# Patient Record
Sex: MALE | Race: WHITE | NOT HISPANIC OR LATINO | Employment: PART TIME | URBAN - METROPOLITAN AREA
[De-identification: names, ages, dates, MRNs, and addresses within clinical notes are randomized per-mention and may not be internally consistent; named-entity substitution may affect disease eponyms.]

---

## 2018-03-14 ENCOUNTER — HOSPITAL ENCOUNTER (OUTPATIENT)
Dept: RADIOLOGY | Facility: MEDICAL CENTER | Age: 68
End: 2018-03-14

## 2018-03-16 ENCOUNTER — HOSPITAL ENCOUNTER (OUTPATIENT)
Dept: RADIATION ONCOLOGY | Facility: MEDICAL CENTER | Age: 68
End: 2018-03-31
Attending: RADIOLOGY
Payer: COMMERCIAL

## 2018-03-16 ENCOUNTER — HOSPITAL ENCOUNTER (OUTPATIENT)
Dept: RADIOLOGY | Facility: MEDICAL CENTER | Age: 68
End: 2018-03-16

## 2018-03-16 VITALS
HEART RATE: 66 BPM | HEIGHT: 70 IN | BODY MASS INDEX: 34.07 KG/M2 | OXYGEN SATURATION: 95 % | TEMPERATURE: 98.2 F | DIASTOLIC BLOOD PRESSURE: 77 MMHG | RESPIRATION RATE: 18 BRPM | SYSTOLIC BLOOD PRESSURE: 154 MMHG | WEIGHT: 238 LBS

## 2018-03-16 DIAGNOSIS — C20 RECTAL CANCER (HCC): ICD-10-CM

## 2018-03-16 PROCEDURE — 99205 OFFICE O/P NEW HI 60 MIN: CPT | Performed by: RADIOLOGY

## 2018-03-16 PROCEDURE — 99214 OFFICE O/P EST MOD 30 MIN: CPT | Performed by: RADIOLOGY

## 2018-03-16 RX ORDER — MULTIVIT WITH MINERALS/LUTEIN
TABLET ORAL
COMMUNITY

## 2018-03-16 RX ORDER — LOPERAMIDE HYDROCHLORIDE 2 MG/1
2 CAPSULE ORAL 4 TIMES DAILY PRN
COMMUNITY

## 2018-03-16 RX ORDER — POLYETHYLENE GLYCOL 3350 17 G/17G
17 POWDER, FOR SOLUTION ORAL DAILY
COMMUNITY

## 2018-03-16 RX ORDER — CHOLECALCIFEROL (VITAMIN D3) 25 MCG
TABLET,CHEWABLE ORAL
COMMUNITY

## 2018-03-16 RX ORDER — TAMSULOSIN HYDROCHLORIDE 0.4 MG/1
0.4 CAPSULE ORAL
COMMUNITY

## 2018-03-16 RX ORDER — CHLORAL HYDRATE 500 MG
1000 CAPSULE ORAL
COMMUNITY

## 2018-03-16 NOTE — NON-PROVIDER
"Patient was seen today in clinic with Dr. Elver Salas for Rectal Cancer.  Vitals signs and weight were obtained and pain assessment was completed.  Allergies and medications were reviewed with the patient.  Review of systems completed.     Vitals/Pain:  Vitals:    03/16/18 0751   BP: 154/77   Pulse: 66   Resp: 18   Temp: 36.8 °C (98.2 °F)   SpO2: 95%   Weight: 108 kg (238 lb)   Height: 1.778 m (5' 10\")        Pain Scale: 0-10  Pain Assessement: 0  Pain Location, Orientation and Scale: no complaints of pain.    Allergies:   Patient has no known allergies.    Current Medications:  No current outpatient prescriptions on file.     No current facility-administered medications for this encounter.          PCP:  Pcp - pt could not remember PCP name (does not use often)        Anjelica Mejia R.N.  "

## 2018-03-16 NOTE — CONSULTS
RADIATION ONCOLOGY CONSULT    DATE OF SERVICE: 3/16/2018    IDENTIFICATION:   67 year old with recurrent rectal adenocarcinoma originally [id number] Stage II status post LAR with local recurrence at anastomosis with pelvic lymphadenopathy    HISTORY OF PRESENT ILLNESS: I had the pleasure of seeing Mr. Sinha today in consultation at the request of Dr. Peters for [id number] Stage II status post LAR with local recurrence at anastomosis with pelvic lymphadenopathy. Patient originally presented in March 2015 and underwent low anterior resection for clinical T2 N0 rectal cancer with 15 nodes sampled. Patient underwent colonoscopy degenerative 6/20/18 which showed recurrent colon adenocarcinoma at surgical anastomosis. He underwent MRI of the pelvis on February 20th 2018 which showed new enhancing nodular enhancement around posterior suture line suspicious for local tumor recurrence with left perirectal lymph nodes. PET CT showed PET activity in area of recurrence as well as positive pelvic lymph nodes. Patient was seen by Dr. Matthew Hinkle [facility] who recommends preoperative chemoRT followed by likely APR. He was seen by Dr. Peters who recommends concurrent 5FU. Patient currently is having poorly formed stools with mucus and occasional blood.    PAST MEDICAL HISTORY:   Past Medical History:   Diagnosis Date   • Arthritis    • BPH (benign prostatic hyperplasia)    • Cellulitis    • Diabetes (CMS-HCC)     pt does not take his metformin   • Rectal cancer (CMS-HCC)        PAST SURGICAL HISTORY:  Past Surgical History:   Procedure Laterality Date   • COLONOSCOPY  01/22/2018    biopsy colonic mucosa anastamosis @ 10cm  positive for moderately diff adenocarcinoma   • LOW ANTERIOR RESECTION LAPAROSCOPIC  03/27/2015    (followed by wound infection 4/14/15)   • COLONOSCOPY      Aug 2017   • OTHER      oral surgery as a child   • OTHER ABDOMINAL SURGERY      appendectomy       CURRENT MEDICATIONS:  Current Outpatient  "Prescriptions   Medication Sig Dispense Refill   • loperamide (IMODIUM) 2 MG Cap Take 2 mg by mouth 4 times a day as needed for Diarrhea.     • tamsulosin (FLOMAX) 0.4 MG capsule Take 0.4 mg by mouth ONE-HALF HOUR AFTER BREAKFAST.     • polyethylene glycol/lytes (MIRALAX) Pack Take 17 g by mouth every day.     • PSYLLIUM HUSK PO Take  by mouth.     • Omega-3 Fatty Acids (FISH OIL) 1000 MG Cap capsule Take 1,000 mg by mouth 3 times a day, with meals.     • Cyanocobalamin (B-12) 1000 MCG Cap Take  by mouth.     • Multiple Vitamin (MULTI-VITAMIN DAILY PO) Take  by mouth.     • Ascorbic Acid (VITAMIN C) 1000 MG Tab Take  by mouth.       No current facility-administered medications for this encounter.        ALLERGIES:    Patient has no known allergies.    FAMILY HISTORY:    Family History   Problem Relation Age of Onset   • Cancer Mother      colon cancer       SOCIAL HISTORY:    Social History   Substance Use Topics   • Smoking status: Never Smoker   • Smokeless tobacco: Never Used   • Alcohol use No     Patient is single (never been ), no children and lives in [place], [place]. Patient works as an operating .     REVIEW OF SYSTEMS:  A greater than 10 point review of systems was completed in patient's chart on 3/16/2018 and scanned in to [facility].    The rest of the review of systems is negative and has been reviewed by me.  All are negative with relationship to this diagnosis with the exception of: Positive for decreased weight, fever, chills, change in voice, blurred vision, abdominal pain, constipation, diarrhea, hemorrhoids, blood in stool, dysuria, frequency, urgency, nocturia, incontinence, muscle pain, joint pain, swelling, neuropathy      PHYSICAL EXAM:    Vitals:    03/16/18 0751   BP: 154/77   Pulse: 66   Resp: 18   Temp: 36.8 °C (98.2 °F)   SpO2: 95%   Weight: 108 kg (238 lb)   Height: 1.778 m (5' 10\")          0= Fully active, able to carry on all pre-disease performance without restriction.    Pain " Scale: 0-10  Pain Assessement: 0  Pain Location, Orientation and Scale: no complaints of pain.    GENERAL: No apparent distress.  HEENT:  Pupils are equal, round, and reactive to light.  Extraocular muscles   are intact. Sclerae nonicteric.  Conjunctivae pink.  Oral cavity, tongue   protrudes midline.   NECK:  Supple without evidence of thyromegaly.  NODES:  No peripheral adenopathy of the neck, supraclavicular fossa bilaterally.  LUNGS:  Clear to ascultation bilaterally   HEART:  Regular rate and rhythm.  No murmur appreciated  EXTREMITIES:  Without Edema.  NEUROLOGIC:  Cranial nerves II through XII were intact. Normal stance and gait motor and sensory grossly within normal limits  Rectal: no palpable mass appreciated      IMAGING:  3/13/18 MR Pelvis      PATHOLOGY:  1/22/18 rectal bx      IMPRESSION:   67 year old with recurrent rectal adenocarcinoma originally [id number] Stage II status post LAR with local recurrence at anastomosis with pelvic lymphadenopathy      RECOMMENDATIONS:   We discussed with patient the general approach to recurrent rectal cancer with pelvic lymphadenopathy is preoperative chemoradiation therapy followed by surgery. We discussed that radiation is given Monday through Friday for 28 treatments with concurrent 5-FU based chemotherapy. Patient stated that he would like to start on April 2 and we will coordinate with Dr. Peters. We discussed acute and late side effects of radiation.    The goal of treatment would be prevent recurrence, decrease the chance of needing a temporary or permanent colostomy, and ultimately delay or prevent death from rectal cancer.  Radiation therapy was discussed including high dose conformal external beam using 3D conformal techniques.       Risks and side effects related to the radiation therapy include those which are:  Likely   • Tanning, redness, or darkening of skin in treatment area; including substantial sandy-anal desquamation causing pain which can become  severe enough to require a break in treatment.  • Rash, itching or peeling of skin   • Temporary hair loss in the treatment area   • Temporary fatigue   • Abdominal cramps   • Frequent bowel movements, sometime with urgency, or diarrhea  • Rectal cramps and irritation with pain on defecation   • Mild rectal bleeding that does not require treatment  • Bladder irritation with a stinging sensation   • Frequency or urgency of urination   • Small amounts of blood in the urine   • In females, vaginal stenosis causing inability to have sexual activity or long term stenosis.    Less Likely    • Urinary obstruction requiring the placement of a temporary urinary catheter and/or dilatation because of stricture (narrowing)  • Less ability to control urine (increased incontinence)  • Inability to achieve an erection (inability of the penis to become hard) in males  • Rectal bleeding that requires medication or laser treatment to stop    Rare but serious   • Injury to the bladder, urethra, bowel, or other tissues in the pelvis or abdomen requiring additional procedure or surgery, such as a colostomy (bag for stool).  • Intestinal obstruction requiring surgery    We will set the patient up with CT simulation with contrast early next week and plan to start on April 2 per patient request.  We will plan on 5 and a half weeks of treatment with concurrent chemotherapy per Dr. Peters

## 2018-03-22 ENCOUNTER — HOSPITAL ENCOUNTER (OUTPATIENT)
Dept: RADIATION ONCOLOGY | Facility: MEDICAL CENTER | Age: 68
End: 2018-03-22

## 2018-03-22 PROCEDURE — 77334 RADIATION TREATMENT AID(S): CPT | Mod: 26 | Performed by: RADIOLOGY

## 2018-03-22 PROCEDURE — 77263 THER RADIOLOGY TX PLNG CPLX: CPT | Performed by: RADIOLOGY

## 2018-03-22 PROCEDURE — 77290 THER RAD SIMULAJ FIELD CPLX: CPT | Performed by: RADIOLOGY

## 2018-03-22 PROCEDURE — 77470 SPECIAL RADIATION TREATMENT: CPT | Mod: 26 | Performed by: RADIOLOGY

## 2018-03-22 PROCEDURE — 77470 SPECIAL RADIATION TREATMENT: CPT | Performed by: RADIOLOGY

## 2018-03-22 PROCEDURE — 77334 RADIATION TREATMENT AID(S): CPT | Performed by: RADIOLOGY

## 2018-03-22 PROCEDURE — 77290 THER RAD SIMULAJ FIELD CPLX: CPT | Mod: 26 | Performed by: RADIOLOGY

## 2018-03-23 ENCOUNTER — PATIENT OUTREACH (OUTPATIENT)
Dept: OTHER | Facility: MEDICAL CENTER | Age: 68
End: 2018-03-23

## 2018-03-29 PROCEDURE — 77300 RADIATION THERAPY DOSE PLAN: CPT | Performed by: RADIOLOGY

## 2018-03-29 PROCEDURE — 77334 RADIATION TREATMENT AID(S): CPT | Mod: 26 | Performed by: RADIOLOGY

## 2018-03-29 PROCEDURE — 77295 3-D RADIOTHERAPY PLAN: CPT | Performed by: RADIOLOGY

## 2018-03-29 PROCEDURE — 77295 3-D RADIOTHERAPY PLAN: CPT | Mod: 26 | Performed by: RADIOLOGY

## 2018-03-29 PROCEDURE — 77334 RADIATION TREATMENT AID(S): CPT | Performed by: RADIOLOGY

## 2018-03-29 PROCEDURE — 77300 RADIATION THERAPY DOSE PLAN: CPT | Mod: 26 | Performed by: RADIOLOGY

## 2018-04-02 ENCOUNTER — HOSPITAL ENCOUNTER (OUTPATIENT)
Dept: RADIATION ONCOLOGY | Facility: MEDICAL CENTER | Age: 68
End: 2018-04-30
Attending: RADIOLOGY
Payer: COMMERCIAL

## 2018-04-02 ENCOUNTER — HOSPITAL ENCOUNTER (OUTPATIENT)
Dept: RADIATION ONCOLOGY | Facility: MEDICAL CENTER | Age: 68
End: 2018-04-02

## 2018-04-02 PROCEDURE — 77412 RADIATION TX DELIVERY LVL 3: CPT | Performed by: RADIOLOGY

## 2018-04-02 PROCEDURE — 77280 THER RAD SIMULAJ FIELD SMPL: CPT | Mod: 26 | Performed by: RADIOLOGY

## 2018-04-02 PROCEDURE — 77280 THER RAD SIMULAJ FIELD SMPL: CPT | Performed by: RADIOLOGY

## 2018-04-03 PROCEDURE — 77387 GUIDANCE FOR RADJ TX DLVR: CPT | Performed by: RADIOLOGY

## 2018-04-03 PROCEDURE — 77014 PR CT GUIDANCE PLACEMENT RAD THERAPY FIELDS: CPT | Mod: 26 | Performed by: RADIOLOGY

## 2018-04-03 PROCEDURE — 77412 RADIATION TX DELIVERY LVL 3: CPT | Performed by: RADIOLOGY

## 2018-04-04 PROCEDURE — 77014 PR CT GUIDANCE PLACEMENT RAD THERAPY FIELDS: CPT | Mod: 26 | Performed by: RADIOLOGY

## 2018-04-04 PROCEDURE — 77387 GUIDANCE FOR RADJ TX DLVR: CPT | Performed by: RADIOLOGY

## 2018-04-04 PROCEDURE — 77412 RADIATION TX DELIVERY LVL 3: CPT | Performed by: RADIOLOGY

## 2018-04-05 ENCOUNTER — HOSPITAL ENCOUNTER (OUTPATIENT)
Dept: RADIATION ONCOLOGY | Facility: MEDICAL CENTER | Age: 68
End: 2018-04-05

## 2018-04-05 PROCEDURE — 77387 GUIDANCE FOR RADJ TX DLVR: CPT | Performed by: RADIOLOGY

## 2018-04-05 PROCEDURE — 77412 RADIATION TX DELIVERY LVL 3: CPT | Performed by: RADIOLOGY

## 2018-04-05 PROCEDURE — 77014 PR CT GUIDANCE PLACEMENT RAD THERAPY FIELDS: CPT | Mod: 26 | Performed by: RADIOLOGY

## 2018-04-05 PROCEDURE — 77336 RADIATION PHYSICS CONSULT: CPT | Performed by: RADIOLOGY

## 2018-04-06 ENCOUNTER — HOSPITAL ENCOUNTER (OUTPATIENT)
Dept: RADIATION ONCOLOGY | Facility: MEDICAL CENTER | Age: 68
End: 2018-04-06

## 2018-04-06 ENCOUNTER — DOCUMENTATION (OUTPATIENT)
Dept: HEMATOLOGY ONCOLOGY | Facility: MEDICAL CENTER | Age: 68
End: 2018-04-06

## 2018-04-06 PROCEDURE — 77014 PR CT GUIDANCE PLACEMENT RAD THERAPY FIELDS: CPT | Mod: 26 | Performed by: RADIOLOGY

## 2018-04-06 PROCEDURE — 77387 GUIDANCE FOR RADJ TX DLVR: CPT | Performed by: RADIOLOGY

## 2018-04-06 PROCEDURE — 77412 RADIATION TX DELIVERY LVL 3: CPT | Performed by: RADIOLOGY

## 2018-04-06 PROCEDURE — 77427 RADIATION TX MANAGEMENT X5: CPT | Performed by: RADIOLOGY

## 2018-04-06 NOTE — PROGRESS NOTES
Nutrition Services:  New XRT  67 year old male with diagnosis of rectal cancer.    Past Medical History:   Diagnosis Date   • Arthritis    • BPH (benign prostatic hyperplasia)    • Cellulitis    • Diabetes (CMS-HCC)     pt does not take his metformin   • Rectal cancer (CMS-HCC)      Pertinent Labs: No chem panel to assess  Pertinent Meds: Imodium, flomax, miralax, psyllium husk, 1,000 mg fish oil, B-12, Multi-vitamin, ascorbic acid    I visited with patient this afternoon following his XRT appointment.  Patient reports that [facility] want to him start losing weight in preparation for a colon resection this summer.  Patient reports that ideally his doctor wants him under 200 pounds before the procedure and encouraged him to lose 2 pounds per week.  I discussed with patient the benefits for slow and steady weight loss while avoiding extreme or severe wt loss.  I also discouraged patient from extreme dieting and food restriction and emphasized the need for adequate nutrition and nourishment during treatment.  Patient is very agreeable and receptive and has already begun changing his eating patterns. In order to control his chronic loose stool, he is only able to eat 3 times per day while he is working.  He has been cooking at home a lot more often and has stopped eating sweets, beef, most pork and many processed foods.  He selects mostly fruits, vegetables, whole grains, chicken, eggs and dairy products.  I praised patient for his efforts and encouraged him to continue with his new healthy eating pattern.  Patient is very active and walks several miles per day to and form his doctors appointments, the post office and the grocery store.  He will take the bus occasionally but has been purposefully trying to walk more often.   Patient reports the prior to treatment he most recently weighed ~ 245 pounds and he has been focusing on slowly reducing this per the recommendations from his doctors.    We  discussed a variety of nutrition tips for slow steady weight loss while focusing on nutrient density/adequacy and I also provided patient with a handout for symptom management during treatment.  Currently patient reports a good energy level and that he has not had any changes with taste or appetite.  [facility]     Male      Nutrition Needs Assessment  cm/kg   Height (inches) 70 177.8   Weight (lbs)  712 263.1706   Age (years) 67    BMI 34.02782    Total Calorie Needs per HBE x.1.2  2882.208   Total Protein Needs (1.0 - 1.5g/kg of CBW) 108.3775 244.1367   Daily Fluids (25ml/kg of CBW) 2700    Calories per Day for ~1-2#/wk wt loss 1800 2200     PLAN/RECOMMEND -   1) Continue with current daily nutrition routine and contact RD should questions or concerns arise.     RD monitoring

## 2018-04-09 ENCOUNTER — HOSPITAL ENCOUNTER (OUTPATIENT)
Dept: RADIATION ONCOLOGY | Facility: MEDICAL CENTER | Age: 68
End: 2018-04-09

## 2018-04-09 PROCEDURE — 77014 PR CT GUIDANCE PLACEMENT RAD THERAPY FIELDS: CPT | Mod: 26 | Performed by: RADIOLOGY

## 2018-04-09 PROCEDURE — 77412 RADIATION TX DELIVERY LVL 3: CPT | Performed by: RADIOLOGY

## 2018-04-09 PROCEDURE — 77387 GUIDANCE FOR RADJ TX DLVR: CPT | Performed by: RADIOLOGY

## 2018-04-10 PROCEDURE — 77014 PR CT GUIDANCE PLACEMENT RAD THERAPY FIELDS: CPT | Mod: 26 | Performed by: RADIOLOGY

## 2018-04-10 PROCEDURE — 77412 RADIATION TX DELIVERY LVL 3: CPT | Performed by: RADIOLOGY

## 2018-04-10 PROCEDURE — 77387 GUIDANCE FOR RADJ TX DLVR: CPT | Performed by: RADIOLOGY

## 2018-04-11 ENCOUNTER — HOSPITAL ENCOUNTER (OUTPATIENT)
Dept: RADIATION ONCOLOGY | Facility: MEDICAL CENTER | Age: 68
End: 2018-04-11

## 2018-04-11 PROCEDURE — 77336 RADIATION PHYSICS CONSULT: CPT | Performed by: RADIOLOGY

## 2018-04-11 PROCEDURE — 77014 PR CT GUIDANCE PLACEMENT RAD THERAPY FIELDS: CPT | Mod: 26 | Performed by: RADIOLOGY

## 2018-04-11 PROCEDURE — 77412 RADIATION TX DELIVERY LVL 3: CPT | Performed by: RADIOLOGY

## 2018-04-11 PROCEDURE — 77387 GUIDANCE FOR RADJ TX DLVR: CPT | Performed by: RADIOLOGY

## 2018-04-12 ENCOUNTER — HOSPITAL ENCOUNTER (OUTPATIENT)
Dept: RADIATION ONCOLOGY | Facility: MEDICAL CENTER | Age: 68
End: 2018-04-12

## 2018-04-12 PROCEDURE — 77387 GUIDANCE FOR RADJ TX DLVR: CPT | Performed by: RADIOLOGY

## 2018-04-12 PROCEDURE — 77412 RADIATION TX DELIVERY LVL 3: CPT | Performed by: RADIOLOGY

## 2018-04-12 PROCEDURE — 77014 PR CT GUIDANCE PLACEMENT RAD THERAPY FIELDS: CPT | Mod: 26 | Performed by: RADIOLOGY

## 2018-04-13 ENCOUNTER — DOCUMENTATION (OUTPATIENT)
Dept: HEMATOLOGY ONCOLOGY | Facility: MEDICAL CENTER | Age: 68
End: 2018-04-13

## 2018-04-13 ENCOUNTER — HOSPITAL ENCOUNTER (OUTPATIENT)
Dept: RADIATION ONCOLOGY | Facility: MEDICAL CENTER | Age: 68
End: 2018-04-13

## 2018-04-13 PROCEDURE — 77387 GUIDANCE FOR RADJ TX DLVR: CPT | Performed by: RADIOLOGY

## 2018-04-13 PROCEDURE — 77412 RADIATION TX DELIVERY LVL 3: CPT | Performed by: RADIOLOGY

## 2018-04-13 PROCEDURE — 77427 RADIATION TX MANAGEMENT X5: CPT | Performed by: RADIOLOGY

## 2018-04-13 PROCEDURE — 77014 PR CT GUIDANCE PLACEMENT RAD THERAPY FIELDS: CPT | Mod: 26 | Performed by: RADIOLOGY

## 2018-04-13 NOTE — PROGRESS NOTES
Nutrition Services: Brief Update  Met w/ pt to follow-up on current intake and appetite. Pt reports that his intake remains good w/ no current c/o GI distress, taste changes, nor swallowing difficulty. Pt also reports consistent adequate eating and healthy behaviors. Therefore, no further questions at this time. RD to continue to monitor PO adequacy and wt status to leave further recommendations if warranted.

## 2018-04-16 ENCOUNTER — HOSPITAL ENCOUNTER (OUTPATIENT)
Dept: RADIATION ONCOLOGY | Facility: MEDICAL CENTER | Age: 68
End: 2018-04-16

## 2018-04-16 PROCEDURE — 77014 PR CT GUIDANCE PLACEMENT RAD THERAPY FIELDS: CPT | Mod: 26 | Performed by: RADIOLOGY

## 2018-04-16 PROCEDURE — 77412 RADIATION TX DELIVERY LVL 3: CPT | Performed by: RADIOLOGY

## 2018-04-16 PROCEDURE — 77387 GUIDANCE FOR RADJ TX DLVR: CPT | Performed by: RADIOLOGY

## 2018-04-17 ENCOUNTER — HOSPITAL ENCOUNTER (OUTPATIENT)
Dept: RADIATION ONCOLOGY | Facility: MEDICAL CENTER | Age: 68
End: 2018-04-17

## 2018-04-17 ENCOUNTER — PATIENT OUTREACH (OUTPATIENT)
Dept: OTHER | Facility: MEDICAL CENTER | Age: 68
End: 2018-04-17

## 2018-04-17 PROCEDURE — 77412 RADIATION TX DELIVERY LVL 3: CPT | Performed by: RADIOLOGY

## 2018-04-17 PROCEDURE — 77014 PR CT GUIDANCE PLACEMENT RAD THERAPY FIELDS: CPT | Mod: 26 | Performed by: RADIOLOGY

## 2018-04-17 PROCEDURE — 77387 GUIDANCE FOR RADJ TX DLVR: CPT | Performed by: RADIOLOGY

## 2018-04-17 NOTE — PROGRESS NOTES
"On 4/17/2018 at 0944 this ONN called patient. Introduced self and concept of nurse navigation. Patient stated that treatment is \"going well.\" Patient states he does have financial concerns that will occur when patient has surgery and will not be working for two months. Patient states that his medical bills are covered via his insurance and denies need for financial assistance in regards to medical bills. [facility] states [facility] and that they have assisted with patient to sign up for Meals on Wheels. This ONN discussed with patient [facility]. Patient understands that this ONN cannot guarantee financial assistance but that patient should fill out application and return it to this ONN. Patient denied questions or concerns at this time. Patient currently receiving chemotherapy and cannot take down this ONN's contact information.     After speaking with patient this ONN notified STEVEN Miner of patient's financial concerns. After speaking with STEVEN Miner this ONN placed in mail for patient [facility] application and this ONN's business card.   "

## 2018-04-18 PROCEDURE — 77014 PR CT GUIDANCE PLACEMENT RAD THERAPY FIELDS: CPT | Mod: 26 | Performed by: RADIOLOGY

## 2018-04-18 PROCEDURE — 77387 GUIDANCE FOR RADJ TX DLVR: CPT | Performed by: RADIOLOGY

## 2018-04-18 PROCEDURE — 77412 RADIATION TX DELIVERY LVL 3: CPT | Performed by: RADIOLOGY

## 2018-04-19 ENCOUNTER — HOSPITAL ENCOUNTER (OUTPATIENT)
Dept: RADIATION ONCOLOGY | Facility: MEDICAL CENTER | Age: 68
End: 2018-04-19

## 2018-04-19 PROCEDURE — 77387 GUIDANCE FOR RADJ TX DLVR: CPT | Performed by: RADIOLOGY

## 2018-04-19 PROCEDURE — 77336 RADIATION PHYSICS CONSULT: CPT | Performed by: RADIOLOGY

## 2018-04-19 PROCEDURE — 77412 RADIATION TX DELIVERY LVL 3: CPT | Performed by: RADIOLOGY

## 2018-04-19 PROCEDURE — 77014 PR CT GUIDANCE PLACEMENT RAD THERAPY FIELDS: CPT | Mod: 26 | Performed by: RADIOLOGY

## 2018-04-20 ENCOUNTER — HOSPITAL ENCOUNTER (OUTPATIENT)
Dept: RADIATION ONCOLOGY | Facility: MEDICAL CENTER | Age: 68
End: 2018-04-20

## 2018-04-20 PROCEDURE — 77014 PR CT GUIDANCE PLACEMENT RAD THERAPY FIELDS: CPT | Mod: 26 | Performed by: RADIOLOGY

## 2018-04-20 PROCEDURE — 77427 RADIATION TX MANAGEMENT X5: CPT | Performed by: RADIOLOGY

## 2018-04-20 PROCEDURE — 77412 RADIATION TX DELIVERY LVL 3: CPT | Performed by: RADIOLOGY

## 2018-04-20 PROCEDURE — 77387 GUIDANCE FOR RADJ TX DLVR: CPT | Performed by: RADIOLOGY

## 2018-04-23 ENCOUNTER — HOSPITAL ENCOUNTER (OUTPATIENT)
Dept: RADIATION ONCOLOGY | Facility: MEDICAL CENTER | Age: 68
End: 2018-04-23

## 2018-04-23 PROCEDURE — 77412 RADIATION TX DELIVERY LVL 3: CPT | Performed by: RADIOLOGY

## 2018-04-23 PROCEDURE — 77014 PR CT GUIDANCE PLACEMENT RAD THERAPY FIELDS: CPT | Mod: 26 | Performed by: RADIOLOGY

## 2018-04-23 PROCEDURE — 77387 GUIDANCE FOR RADJ TX DLVR: CPT | Performed by: RADIOLOGY

## 2018-04-24 ENCOUNTER — HOSPITAL ENCOUNTER (OUTPATIENT)
Dept: RADIATION ONCOLOGY | Facility: MEDICAL CENTER | Age: 68
End: 2018-04-24

## 2018-04-24 PROCEDURE — 77412 RADIATION TX DELIVERY LVL 3: CPT | Performed by: RADIOLOGY

## 2018-04-24 PROCEDURE — 77387 GUIDANCE FOR RADJ TX DLVR: CPT | Performed by: RADIOLOGY

## 2018-04-24 PROCEDURE — 77014 PR CT GUIDANCE PLACEMENT RAD THERAPY FIELDS: CPT | Mod: 26 | Performed by: RADIOLOGY

## 2018-04-25 ENCOUNTER — HOSPITAL ENCOUNTER (OUTPATIENT)
Dept: RADIATION ONCOLOGY | Facility: MEDICAL CENTER | Age: 68
End: 2018-04-25

## 2018-04-25 PROCEDURE — 77387 GUIDANCE FOR RADJ TX DLVR: CPT | Performed by: RADIOLOGY

## 2018-04-25 PROCEDURE — 77412 RADIATION TX DELIVERY LVL 3: CPT | Performed by: RADIOLOGY

## 2018-04-25 PROCEDURE — 77014 PR CT GUIDANCE PLACEMENT RAD THERAPY FIELDS: CPT | Mod: 26 | Performed by: RADIOLOGY

## 2018-04-26 ENCOUNTER — HOSPITAL ENCOUNTER (OUTPATIENT)
Dept: RADIATION ONCOLOGY | Facility: MEDICAL CENTER | Age: 68
End: 2018-04-26

## 2018-04-26 ENCOUNTER — PATIENT OUTREACH (OUTPATIENT)
Dept: OTHER | Facility: MEDICAL CENTER | Age: 68
End: 2018-04-26

## 2018-04-26 PROCEDURE — 77412 RADIATION TX DELIVERY LVL 3: CPT | Performed by: RADIOLOGY

## 2018-04-26 PROCEDURE — 77014 PR CT GUIDANCE PLACEMENT RAD THERAPY FIELDS: CPT | Mod: 26 | Performed by: RADIOLOGY

## 2018-04-26 PROCEDURE — 77336 RADIATION PHYSICS CONSULT: CPT | Performed by: RADIOLOGY

## 2018-04-26 PROCEDURE — 77387 GUIDANCE FOR RADJ TX DLVR: CPT | Performed by: RADIOLOGY

## 2018-04-26 NOTE — PROGRESS NOTES
"On 4/26/2018 at 0947 this ONN called patient. Patient confirmed he received [facility] application. Patient stated he would complete application and leave with registration at [facility] within the next few days. Patient denies other questions or concerns at this time and stated that \"everything is going well.\" Encouraged patient to call this ONN if needed. Patient confirms he has this ONN's contact information.   "

## 2018-04-27 ENCOUNTER — HOSPITAL ENCOUNTER (OUTPATIENT)
Dept: RADIATION ONCOLOGY | Facility: MEDICAL CENTER | Age: 68
End: 2018-04-27

## 2018-04-27 PROCEDURE — 77014 PR CT GUIDANCE PLACEMENT RAD THERAPY FIELDS: CPT | Mod: 26 | Performed by: RADIOLOGY

## 2018-04-27 PROCEDURE — 77427 RADIATION TX MANAGEMENT X5: CPT | Performed by: RADIOLOGY

## 2018-04-27 PROCEDURE — 77412 RADIATION TX DELIVERY LVL 3: CPT | Performed by: RADIOLOGY

## 2018-04-27 PROCEDURE — 77387 GUIDANCE FOR RADJ TX DLVR: CPT | Performed by: RADIOLOGY

## 2018-04-30 ENCOUNTER — HOSPITAL ENCOUNTER (OUTPATIENT)
Dept: RADIATION ONCOLOGY | Facility: MEDICAL CENTER | Age: 68
End: 2018-04-30

## 2018-04-30 PROCEDURE — 77387 GUIDANCE FOR RADJ TX DLVR: CPT | Performed by: RADIOLOGY

## 2018-04-30 PROCEDURE — 77412 RADIATION TX DELIVERY LVL 3: CPT | Performed by: RADIOLOGY

## 2018-04-30 PROCEDURE — 77014 PR CT GUIDANCE PLACEMENT RAD THERAPY FIELDS: CPT | Mod: 26 | Performed by: RADIOLOGY

## 2018-05-01 ENCOUNTER — HOSPITAL ENCOUNTER (OUTPATIENT)
Dept: RADIATION ONCOLOGY | Facility: MEDICAL CENTER | Age: 68
End: 2018-05-31
Attending: RADIOLOGY
Payer: COMMERCIAL

## 2018-05-01 ENCOUNTER — HOSPITAL ENCOUNTER (OUTPATIENT)
Dept: RADIATION ONCOLOGY | Facility: MEDICAL CENTER | Age: 68
End: 2018-05-01

## 2018-05-01 PROCEDURE — 77014 PR CT GUIDANCE PLACEMENT RAD THERAPY FIELDS: CPT | Mod: 26 | Performed by: RADIOLOGY

## 2018-05-01 PROCEDURE — 77412 RADIATION TX DELIVERY LVL 3: CPT | Performed by: RADIOLOGY

## 2018-05-01 PROCEDURE — 77387 GUIDANCE FOR RADJ TX DLVR: CPT | Performed by: RADIOLOGY

## 2018-05-02 ENCOUNTER — HOSPITAL ENCOUNTER (OUTPATIENT)
Dept: RADIATION ONCOLOGY | Facility: MEDICAL CENTER | Age: 68
End: 2018-05-02

## 2018-05-02 PROCEDURE — 77307 TELETHX ISODOSE PLAN CPLX: CPT | Performed by: RADIOLOGY

## 2018-05-02 PROCEDURE — 77307 TELETHX ISODOSE PLAN CPLX: CPT | Mod: 26 | Performed by: RADIOLOGY

## 2018-05-02 PROCEDURE — 77336 RADIATION PHYSICS CONSULT: CPT | Mod: XU | Performed by: RADIOLOGY

## 2018-05-02 PROCEDURE — 77334 RADIATION TREATMENT AID(S): CPT | Mod: 26 | Performed by: RADIOLOGY

## 2018-05-02 PROCEDURE — 77334 RADIATION TREATMENT AID(S): CPT | Performed by: RADIOLOGY

## 2018-05-02 PROCEDURE — 77412 RADIATION TX DELIVERY LVL 3: CPT | Performed by: RADIOLOGY

## 2018-05-02 PROCEDURE — 77387 GUIDANCE FOR RADJ TX DLVR: CPT | Performed by: RADIOLOGY

## 2018-05-03 ENCOUNTER — HOSPITAL ENCOUNTER (OUTPATIENT)
Dept: RADIATION ONCOLOGY | Facility: MEDICAL CENTER | Age: 68
End: 2018-05-03

## 2018-05-03 PROCEDURE — 77387 GUIDANCE FOR RADJ TX DLVR: CPT | Performed by: RADIOLOGY

## 2018-05-03 PROCEDURE — 77014 PR CT GUIDANCE PLACEMENT RAD THERAPY FIELDS: CPT | Mod: 26 | Performed by: RADIOLOGY

## 2018-05-03 PROCEDURE — 77412 RADIATION TX DELIVERY LVL 3: CPT | Performed by: RADIOLOGY

## 2018-05-04 ENCOUNTER — HOSPITAL ENCOUNTER (OUTPATIENT)
Dept: RADIATION ONCOLOGY | Facility: MEDICAL CENTER | Age: 68
End: 2018-05-04

## 2018-05-04 ENCOUNTER — DOCUMENTATION (OUTPATIENT)
Dept: HEMATOLOGY ONCOLOGY | Facility: MEDICAL CENTER | Age: 68
End: 2018-05-04

## 2018-05-04 PROCEDURE — 77412 RADIATION TX DELIVERY LVL 3: CPT | Performed by: RADIOLOGY

## 2018-05-04 PROCEDURE — 77014 PR CT GUIDANCE PLACEMENT RAD THERAPY FIELDS: CPT | Mod: 26 | Performed by: RADIOLOGY

## 2018-05-04 PROCEDURE — 77387 GUIDANCE FOR RADJ TX DLVR: CPT | Performed by: RADIOLOGY

## 2018-05-04 PROCEDURE — 77427 RADIATION TX MANAGEMENT X5: CPT | Performed by: RADIOLOGY

## 2018-05-04 NOTE — PROGRESS NOTES
"Nutrition Progress Note:  Patient's weight per stand up scale in RTX = 227 lbs (% wt change x1 month = 4.6, which is classified as significant loss, per guidelines).  Patient is voluntarily loosing weight; goal is to get below 200 lbs [facility], he states.    Per interview with dietitian, patient states good appetite, but he is having diarrhea at this time, which is \"normal\" for him, he reports today.  He is taking Imodium at least x1 daily.  Pt also c/o demanding work schedule inhibiting appetite, especially in the evenings (pt works from 2-10 PM).  He consumes \"quick and easy\" snacks when he arrives home from work, such as scrambled eggs + tortilla, rice with milk and cinnamon, he reports.  Patient also walks daily at least 2-5 miles between walking to and from work and walking around work.  He tries to be very active for weight loss, he states.    Intervention/Goals:  -RD praised patient for current efforts and encouraged small, frequent meals  -Reviewed Plate Method goals for half of plate non-starchy vegetable  -RD recommended to patient trying Healthy Choice protein bowls for increased fiber and protein from both plant-based and animal-based.  He verbalizes very good understanding.     "

## 2018-05-07 ENCOUNTER — HOSPITAL ENCOUNTER (OUTPATIENT)
Dept: RADIATION ONCOLOGY | Facility: MEDICAL CENTER | Age: 68
End: 2018-05-07

## 2018-05-07 PROCEDURE — 77280 THER RAD SIMULAJ FIELD SMPL: CPT | Mod: 26 | Performed by: RADIOLOGY

## 2018-05-07 PROCEDURE — 77412 RADIATION TX DELIVERY LVL 3: CPT | Performed by: RADIOLOGY

## 2018-05-07 PROCEDURE — 77280 THER RAD SIMULAJ FIELD SMPL: CPT | Performed by: RADIOLOGY

## 2018-05-08 ENCOUNTER — HOSPITAL ENCOUNTER (OUTPATIENT)
Dept: RADIATION ONCOLOGY | Facility: MEDICAL CENTER | Age: 68
End: 2018-05-08

## 2018-05-08 PROCEDURE — 77387 GUIDANCE FOR RADJ TX DLVR: CPT | Performed by: RADIOLOGY

## 2018-05-08 PROCEDURE — 77014 PR CT GUIDANCE PLACEMENT RAD THERAPY FIELDS: CPT | Mod: 26 | Performed by: RADIOLOGY

## 2018-05-08 PROCEDURE — 77412 RADIATION TX DELIVERY LVL 3: CPT | Performed by: RADIOLOGY

## 2018-05-09 PROCEDURE — 77412 RADIATION TX DELIVERY LVL 3: CPT | Performed by: RADIOLOGY

## 2018-05-09 PROCEDURE — 77427 RADIATION TX MANAGEMENT X5: CPT | Performed by: RADIOLOGY

## 2018-05-09 PROCEDURE — 77014 PR CT GUIDANCE PLACEMENT RAD THERAPY FIELDS: CPT | Mod: 26 | Performed by: RADIOLOGY

## 2018-05-09 PROCEDURE — 77336 RADIATION PHYSICS CONSULT: CPT | Performed by: RADIOLOGY

## 2018-05-09 PROCEDURE — 77387 GUIDANCE FOR RADJ TX DLVR: CPT | Performed by: RADIOLOGY

## 2018-08-14 ENCOUNTER — HOSPITAL ENCOUNTER (OUTPATIENT)
Dept: RADIATION ONCOLOGY | Facility: MEDICAL CENTER | Age: 68
End: 2018-08-31
Attending: RADIOLOGY
Payer: COMMERCIAL

## 2018-08-14 VITALS
SYSTOLIC BLOOD PRESSURE: 140 MMHG | DIASTOLIC BLOOD PRESSURE: 70 MMHG | OXYGEN SATURATION: 95 % | TEMPERATURE: 98.4 F | HEART RATE: 79 BPM | BODY MASS INDEX: 33.99 KG/M2 | WEIGHT: 236.9 LBS

## 2018-08-14 PROCEDURE — 99212 OFFICE O/P EST SF 10 MIN: CPT | Performed by: RADIOLOGY

## 2018-08-14 PROCEDURE — 99214 OFFICE O/P EST MOD 30 MIN: CPT | Performed by: RADIOLOGY

## 2018-08-14 ASSESSMENT — PAIN SCALES - GENERAL: PAINLEVEL: 3=SLIGHT PAIN

## 2018-08-14 NOTE — NON-PROVIDER
Patient was seen today in clinic with Dr. Salas for follow up.  Vitals signs and weight were obtained and pain assessment was completed.  Allergies and medications were reviewed with the patient.  Toxicities of treatment assessed.     Vitals/Pain:  Vitals:    08/14/18 1050   BP: 140/70   Pulse: 79   Temp: 36.9 °C (98.4 °F)   SpO2: 95%   Weight: 107.5 kg (236 lb 14.4 oz)   Pain Score: 3=Slight Pain        Allergies:   Patient has no known allergies.    Current Medications:  Current Outpatient Prescriptions   Medication Sig Dispense Refill   • loperamide (IMODIUM) 2 MG Cap Take 2 mg by mouth 4 times a day as needed for Diarrhea.     • tamsulosin (FLOMAX) 0.4 MG capsule Take 0.4 mg by mouth ONE-HALF HOUR AFTER BREAKFAST.     • polyethylene glycol/lytes (MIRALAX) Pack Take 17 g by mouth every day.     • PSYLLIUM HUSK PO Take  by mouth.     • Omega-3 Fatty Acids (FISH OIL) 1000 MG Cap capsule Take 1,000 mg by mouth 3 times a day, with meals.     • Cyanocobalamin (B-12) 1000 MCG Cap Take  by mouth.     • Multiple Vitamin (MULTI-VITAMIN DAILY PO) Take  by mouth.     • Ascorbic Acid (VITAMIN C) 1000 MG Tab Take  by mouth.       No current facility-administered medications for this encounter.          PCP:  Pcp Pt States None        Timur Su Ass't

## 2018-08-14 NOTE — PROGRESS NOTES
RADIATION ONCOLOGY FOLLOW-UP    DATE OF SERVICE: 8/14/2018    IDENTIFICATION:   68 year old with recurrent rectal adenocarcinoma originally [id number] Stage II status post LAR with local recurrence at anastomosis with pelvic lymphadenopathy status post  50.4 Gy in 28 fractions completed 5/9/18 s/p APR 7/3/18 with [id number].    HISTORY OF PRESENT ILLNESS:   I had the pleasure of seeing Mr. Sinha today in consultation at the request of Dr. Peters for [id number] Stage II status post LAR with local recurrence at anastomosis with pelvic lymphadenopathy. Patient originally presented in March 2015 and underwent low anterior resection for clinical T2 N0 rectal cancer with 15 nodes sampled. Patient underwent colonoscopy degenerative 6/20/18 which showed recurrent colon adenocarcinoma at surgical anastomosis. He underwent MRI of the pelvis on February 20th 2018 which showed new enhancing nodular enhancement around posterior suture line suspicious for local tumor recurrence with left perirectal lymph nodes. PET CT showed PET activity in area of recurrence as well as positive pelvic lymph nodes. Patient was seen by Dr. Matthew Hinkle [facility] who recommends preoperative chemoRT followed by likely APR. He was seen by Dr. Peters who recommends concurrent 5FU.    Interval History:  Patient completed 50.4 Gy in 28 fractions on 5/9/18.  He underwent surgical resection with abdominal perineal resection on July 3, 2018 which showed recurrent invasive adenocarcinoma involving perirectal soft tissue with perineural and lymphovascular space invasion with no tumor in 10 lymph nodes and all margins were uninvolved by invasive carcinoma there were tumor deposits present about 1.5 cm submitted is lymph node [id number]. He is doing well with colostomy bag.  He denies any pain.      CURRENT MEDICATIONS:  Current Outpatient Prescriptions   Medication Sig Dispense Refill   • loperamide (IMODIUM) 2 MG Cap Take 2 mg by mouth 4 times a day as needed  for Diarrhea.     • tamsulosin (FLOMAX) 0.4 MG capsule Take 0.4 mg by mouth ONE-HALF HOUR AFTER BREAKFAST.     • polyethylene glycol/lytes (MIRALAX) Pack Take 17 g by mouth every day.     • PSYLLIUM HUSK PO Take  by mouth.     • Omega-3 Fatty Acids (FISH OIL) 1000 MG Cap capsule Take 1,000 mg by mouth 3 times a day, with meals.     • Cyanocobalamin (B-12) 1000 MCG Cap Take  by mouth.     • Multiple Vitamin (MULTI-VITAMIN DAILY PO) Take  by mouth.     • Ascorbic Acid (VITAMIN C) 1000 MG Tab Take  by mouth.       No current facility-administered medications for this encounter.        ALLERGIES:  Patient has no known allergies.    PHYSICAL EXAM:   /70   Pulse 79   Temp 36.9 °C (98.4 °F)   Wt 107.5 kg (236 lb 14.4 oz)   SpO2 95%   BMI 33.99 kg/m²     GENERAL: No apparent distress.  HEENT:  Pupils are equal, round, and reactive to light.  Extraocular muscles   are intact. Sclerae nonicteric.  Conjunctivae pink.  Oral cavity, tongue   protrudes midline.   NECK:  Supple without evidence of thyromegaly.  NODES:  No peripheral adenopathy of the neck bilaterally.  LUNGS:  Clear to ascultation bilaterally   HEART:  Regular rate  ABDOMEN:  +ostomy bad  EXTREMITIES:  Without Edema.  NEUROLOGIC:  Cranial nerves II through XII were intact. Normal stance and gait motor and sensory grossly within normal limits        Pathology;  Reviewed pathology with patient  7/3/18      IMPRESSION:    68 year old with recurrent rectal adenocarcinoma originally [id number] Stage II status post LAR with local recurrence at anastomosis with pelvic lymphadenopathy status post  50.4 Gy in 28 fractions completed 5/9/18 s/p APR 7/3/18 with [id number].    RECOMMENDATIONS:   Patient is doing well after surgery with no clinical evidence of disease recurrence and minimal radiation side effects.  He is recommended to see Dr. Peters every 3-6 months to monitor for evidence of disease recurrence.  He can see me on a as needed basis.    Thank you for the  opportunity to participate in his care.  If any questions or comments, please do not hesitate in calling.

## 2018-08-21 ENCOUNTER — DOCUMENTATION (OUTPATIENT)
Dept: OTHER | Facility: MEDICAL CENTER | Age: 68
End: 2018-08-21

## 2018-09-11 ENCOUNTER — DOCUMENTATION (OUTPATIENT)
Dept: OTHER | Facility: MEDICAL CENTER | Age: 68
End: 2018-09-11

## 2018-09-21 ENCOUNTER — PATIENT OUTREACH (OUTPATIENT)
Dept: OTHER | Facility: MEDICAL CENTER | Age: 68
End: 2018-09-21

## 2018-09-21 NOTE — PROGRESS NOTES
On 9/21/2018 at 1015 this ONN called patient no answer, left voicemail with call back number regarding Survivorship Care Plan.

## 2018-09-25 ENCOUNTER — PATIENT OUTREACH (OUTPATIENT)
Dept: OTHER | Facility: MEDICAL CENTER | Age: 68
End: 2018-09-25

## 2018-09-25 NOTE — PROGRESS NOTES
On 9/25/2018 at 1132 patient called this ONN. Patient verified he received Treatment Summary and Survivorship Care Plan. Patient understands SCP is not complete due to information needing to be filled [facility] by [facility]. Patient states he had surgery in [place]. Patient states he has recovered well from surgery and is back at work. Patient verbalizes the importance of follow-up. Patient denies questions or concerns for this ONN at this time. Patient complete [facility] ONN services.

## 2021-03-03 DIAGNOSIS — Z23 NEED FOR VACCINATION: ICD-10-CM

## 2023-07-27 ENCOUNTER — HOSPITAL ENCOUNTER (OUTPATIENT)
Dept: RADIOLOGY | Facility: MEDICAL CENTER | Age: 73
End: 2023-07-27
Payer: COMMERCIAL

## 2023-07-27 DIAGNOSIS — D01.0 CARCINOMA IN SITU OF COLON: ICD-10-CM

## 2023-07-27 PROCEDURE — A9552 F18 FDG: HCPCS

## 2023-10-31 ENCOUNTER — HOSPITAL ENCOUNTER (OUTPATIENT)
Dept: RADIOLOGY | Facility: MEDICAL CENTER | Age: 73
End: 2023-10-31
Attending: SURGERY
Payer: COMMERCIAL

## 2023-10-31 DIAGNOSIS — C78.01 SECONDARY MALIGNANT NEOPLASM OF RIGHT LUNG (HCC): ICD-10-CM

## 2023-10-31 PROCEDURE — A9552 F18 FDG: HCPCS

## 2023-11-21 ENCOUNTER — HOSPITAL ENCOUNTER (OUTPATIENT)
Dept: RADIOLOGY | Facility: MEDICAL CENTER | Age: 73
End: 2023-11-21
Payer: COMMERCIAL

## 2023-11-27 ENCOUNTER — HOSPITAL ENCOUNTER (OUTPATIENT)
Dept: RADIATION ONCOLOGY | Facility: MEDICAL CENTER | Age: 73
End: 2023-11-30
Attending: RADIOLOGY
Payer: COMMERCIAL

## 2023-11-27 ENCOUNTER — HOSPITAL ENCOUNTER (OUTPATIENT)
Dept: RADIATION ONCOLOGY | Facility: MEDICAL CENTER | Age: 73
End: 2023-11-27

## 2023-11-27 VITALS
DIASTOLIC BLOOD PRESSURE: 68 MMHG | HEART RATE: 93 BPM | RESPIRATION RATE: 18 BRPM | SYSTOLIC BLOOD PRESSURE: 154 MMHG | OXYGEN SATURATION: 98 % | BODY MASS INDEX: 32.73 KG/M2 | WEIGHT: 228.62 LBS | HEIGHT: 70 IN

## 2023-11-27 DIAGNOSIS — C34.91 METASTATIC LUNG CANCER (METASTASIS FROM LUNG TO OTHER SITE), RIGHT (HCC): ICD-10-CM

## 2023-11-27 DIAGNOSIS — C20 MALIGNANT NEOPLASM OF RECTUM (HCC): ICD-10-CM

## 2023-11-27 PROCEDURE — 77334 RADIATION TREATMENT AID(S): CPT | Mod: 26 | Performed by: RADIOLOGY

## 2023-11-27 PROCEDURE — 77470 SPECIAL RADIATION TREATMENT: CPT | Mod: 26 | Performed by: RADIOLOGY

## 2023-11-27 PROCEDURE — 77334 RADIATION TREATMENT AID(S): CPT | Performed by: RADIOLOGY

## 2023-11-27 PROCEDURE — 77470 SPECIAL RADIATION TREATMENT: CPT | Performed by: RADIOLOGY

## 2023-11-27 PROCEDURE — 77263 THER RADIOLOGY TX PLNG CPLX: CPT | Performed by: RADIOLOGY

## 2023-11-27 PROCEDURE — 77290 THER RAD SIMULAJ FIELD CPLX: CPT | Mod: 26 | Performed by: RADIOLOGY

## 2023-11-27 PROCEDURE — 77290 THER RAD SIMULAJ FIELD CPLX: CPT | Performed by: RADIOLOGY

## 2023-11-27 PROCEDURE — 99214 OFFICE O/P EST MOD 30 MIN: CPT | Mod: 25 | Performed by: RADIOLOGY

## 2023-11-27 PROCEDURE — 99205 OFFICE O/P NEW HI 60 MIN: CPT | Mod: 25 | Performed by: RADIOLOGY

## 2023-11-27 RX ORDER — ACETAMINOPHEN 325 MG/1
650 TABLET ORAL EVERY 6 HOURS PRN
COMMUNITY
Start: 2023-06-24

## 2023-11-27 RX ORDER — DILTIAZEM HYDROCHLORIDE 120 MG/1
120 TABLET, FILM COATED ORAL DAILY
COMMUNITY

## 2023-11-27 RX ORDER — OXYBUTYNIN CHLORIDE 5 MG/1
5 TABLET, EXTENDED RELEASE ORAL DAILY
COMMUNITY
Start: 2023-06-12

## 2023-11-27 RX ORDER — LIDOCAINE 50 MG/G
1 PATCH TOPICAL EVERY 24 HOURS
COMMUNITY
Start: 2023-08-08

## 2023-11-27 RX ORDER — PREGABALIN 75 MG/1
75 CAPSULE ORAL 2 TIMES DAILY
COMMUNITY
Start: 2023-11-17

## 2023-11-27 RX ORDER — ROSUVASTATIN CALCIUM 10 MG/1
10 TABLET, COATED ORAL DAILY
COMMUNITY
Start: 2023-06-14

## 2023-11-27 RX ORDER — CYCLOBENZAPRINE HCL 10 MG
5 TABLET ORAL 2 TIMES DAILY PRN
COMMUNITY
Start: 2023-10-10

## 2023-11-27 ASSESSMENT — PAIN SCALES - GENERAL: PAINLEVEL: 7=MODERATE-SEVERE PAIN

## 2023-11-27 NOTE — PROGRESS NOTES
"Patient was seen today in clinic with Dr. Salas for consult.  Vitals signs and weight were obtained and pain assessment was completed.  Allergies and medications were reviewed with the patient.       Vitals/Pain:  Vitals:    11/27/23 1303   BP: (!) 154/68   BP Location: Right arm   Patient Position: Sitting   BP Cuff Size: Adult long   Pulse: 93   Resp: 18   SpO2: 98%   Weight: 104 kg (228 lb 9.9 oz)   Height: 1.778 m (5' 10\")   Pain Score: 7=Moderate-Severe Pain        Allergies:   Gabapentin    Current Medications:  Current Outpatient Medications   Medication Sig Dispense Refill    acetaminophen (TYLENOL) 325 MG Tab Take 650 mg by mouth every 6 hours as needed.      cyclobenzaprine (FLEXERIL) 10 mg Tab Take 5 mg by mouth 2 times a day as needed.      lidocaine (LIDODERM) 5 % Patch Place 1 Patch on the skin every 24 hours.      diclofenac sodium (VOLTAREN) 1 % Gel Apply 2 g topically 4 times a day as needed.      pregabalin (LYRICA) 75 MG Cap Take 75 mg by mouth 2 times a day.      rosuvastatin (CRESTOR) 10 MG Tab Take 10 mg by mouth every day.      oxybutynin SR (DITROPAN-XL) 5 MG TABLET SR 24 HR Take 5 mg by mouth every day.      apixaban (ELIQUIS) 5mg Tab Take 5 mg by mouth 2 times a day.      dilTIAZem (CARDIZEM) 120 MG Tab Take 120 mg by mouth every day.      tamsulosin (FLOMAX) 0.4 MG capsule Take 0.4 mg by mouth ONE-HALF HOUR AFTER BREAKFAST.      Omega-3 Fatty Acids (FISH OIL) 1000 MG Cap capsule Take 1,000 mg by mouth 3 times a day, with meals.      Cyanocobalamin (B-12) 1000 MCG Cap Take  by mouth.      Multiple Vitamin (MULTI-VITAMIN DAILY PO) Take  by mouth.      Ascorbic Acid (VITAMIN C) 1000 MG Tab Take  by mouth.      loperamide (IMODIUM) 2 MG Cap Take 2 mg by mouth 4 times a day as needed for Diarrhea. (Patient not taking: Reported on 11/27/2023)      polyethylene glycol/lytes (MIRALAX) Pack Take 17 g by mouth every day. (Patient not taking: Reported on 11/27/2023)      PSYLLIUM HUSK PO Take  by " mouth. (Patient not taking: Reported on 11/27/2023)       No current facility-administered medications for this encounter.           Carmen Walters R.N.

## 2023-11-27 NOTE — CONSULTS
RADIATION ONCOLOGY CONSULT    DATE OF SERVICE: 11/27/2023    IDENTIFICATION: A 73 y.o. male with   Visit Diagnoses     ICD-10-CM   1. Rectal cancer (HCC)  C20   2. Metastatic lung cancer (metastasis from lung to other site), right (HCC)  C34.91     Rectal cancer (HCC)  Staging form: Colon and Rectum, AJCC 8th Edition  - Pathologic stage from 11/27/2023: Stage ARABELLA (rpT2, pN1c, cM1a) - Signed by Elver Salas M.D. on 11/27/2023  Stage prefix: Recurrence  Total positive nodes: 0    He is here at the kind request of Dr. Sher Huston    HISTORY OF PRESENT ILLNESS:   I had the pleasure of seeing Mr. Sinha today in consultation at the request of Dr. Peters for [id number] Stage II status post LAR with local recurrence at anastomosis with pelvic lymphadenopathy. Patient originally presented in March 2015 and underwent low anterior resection for clinical T2 N0 rectal cancer with 15 nodes sampled. Patient underwent colonoscopy degenerative 6/20/18 which showed recurrent colon adenocarcinoma at surgical anastomosis. He underwent MRI of the pelvis on February 20th 2018 which showed new enhancing nodular enhancement around posterior suture line suspicious for local tumor recurrence with left perirectal lymph nodes. PET CT showed PET activity in area of recurrence as well as positive pelvic lymph nodes. Patient was seen by Dr. Matthew Hinkle [facility] who recommends preoperative chemoRT followed by likely APR. He was seen by Dr. Peters who recommends concurrent 5FU.     8/14/18  Patient completed 50.4 Gy in 28 fractions on 5/9/18.  He underwent surgical resection with abdominal perineal resection on July 3, 2018 which showed recurrent invasive adenocarcinoma involving perirectal soft tissue with perineural and lymphovascular space invasion with no tumor in 10 lymph nodes and all margins were uninvolved by invasive carcinoma there were tumor deposits present about 1.5 cm submitted is lymph node [id number]. He is doing well  with colostomy bag.  He denies any pain.     Interval History:  Patient in the interim had restaging CT and then PET July 27, 2023 which showed right lower lobe spiculated nodule with biopsy September 13, 2023 which showed adenocarcinoma consistent with colonic primary.  Patient saw thoracic surgery and discussed not a good surgical candidate underwent restaging PET/CT October 31, 2023 which showed right lower lobe mass 2.57 SUV and [facility] and consistent with oligo metastatic recurrence with plan for stereotactic body therapy.  Overall feels well with no symptoms at this time.      PAST MEDICAL HISTORY:  Past Medical History:   Diagnosis Date    A-fib (HCC)     Arthritis     BPH (benign prostatic hyperplasia)     Cellulitis     Diabetes (HCC)     pt does not take his metformin    Kidney disease     stage 3    Rectal cancer (HCC)     Sleep apnea     no CPAP       PAST SURGICAL HISTORY:  Past Surgical History:   Procedure Laterality Date    CATARACT EXTRACTION WITH IOL Bilateral 2020    COLONOSCOPY  01/22/2018    biopsy colonic mucosa anastamosis @ 10cm  positive for moderately diff adenocarcinoma    LOW ANTERIOR RESECTION LAPAROSCOPIC  03/27/2015    (followed by wound infection 4/14/15)    COLONOSCOPY      Aug 2017    OTHER      oral surgery as a child    OTHER ABDOMINAL SURGERY      appendectomy    TOE AMPUTATION Bilateral     R 2nd digit, L 2nd digit       CURRENT MEDICATIONS:  Current Outpatient Medications   Medication Sig Dispense Refill    acetaminophen (TYLENOL) 325 MG Tab Take 650 mg by mouth every 6 hours as needed.      cyclobenzaprine (FLEXERIL) 10 mg Tab Take 5 mg by mouth 2 times a day as needed.      lidocaine (LIDODERM) 5 % Patch Place 1 Patch on the skin every 24 hours.      diclofenac sodium (VOLTAREN) 1 % Gel Apply 2 g topically 4 times a day as needed.      pregabalin (LYRICA) 75 MG Cap Take 75 mg by mouth 2 times a day.      rosuvastatin (CRESTOR)  10 MG Tab Take 10 mg by mouth every day.      oxybutynin SR (DITROPAN-XL) 5 MG TABLET SR 24 HR Take 5 mg by mouth every day.      apixaban (ELIQUIS) 5mg Tab Take 5 mg by mouth 2 times a day.      dilTIAZem (CARDIZEM) 120 MG Tab Take 120 mg by mouth every day.      tamsulosin (FLOMAX) 0.4 MG capsule Take 0.4 mg by mouth ONE-HALF HOUR AFTER BREAKFAST.      Omega-3 Fatty Acids (FISH OIL) 1000 MG Cap capsule Take 1,000 mg by mouth 3 times a day, with meals.      Cyanocobalamin (B-12) 1000 MCG Cap Take  by mouth.      Multiple Vitamin (MULTI-VITAMIN DAILY PO) Take  by mouth.      Ascorbic Acid (VITAMIN C) 1000 MG Tab Take  by mouth.      loperamide (IMODIUM) 2 MG Cap Take 2 mg by mouth 4 times a day as needed for Diarrhea. (Patient not taking: Reported on 11/27/2023)      polyethylene glycol/lytes (MIRALAX) Pack Take 17 g by mouth every day. (Patient not taking: Reported on 11/27/2023)      PSYLLIUM HUSK PO Take  by mouth. (Patient not taking: Reported on 11/27/2023)       No current facility-administered medications for this encounter.       ALLERGIES:    Gabapentin    FAMILY HISTORY:    family history includes COPD in his father; Cancer in his mother.    SOCIAL HISTORY:     reports that he has quit smoking. His smoking use included cigarettes. He has never used smokeless tobacco. He reports that he does not drink alcohol and does not use drugs. He states that he lives in [place] alone. 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     REVIEW OF SYSTEMS:  A review of systems for today's date of service was reviewed and uploaded into the electronic medical record.      PHYSICAL EXAM:    ECOG PERFORMANCE STATUS:      11/27/2023     1:18 PM   ECOG Performance Review   ECOG Performance Status Restricted in physically strenuous activity but ambulatory and able to carry out work of a light or sedentary nature, e.g., light house work, office work         11/27/2023     1:17 PM   Karnofsky Score  "  Karnofsky Score 70     BP (!) 154/68 (BP Location: Right arm, Patient Position: Sitting, BP Cuff Size: Adult long)   Pulse 93   Resp 18   Ht 1.778 m (5' 10\")   Wt 104 kg (228 lb 9.9 oz)   SpO2 98%   BMI 32.80 kg/m²   Physical Exam  Vitals reviewed.   HENT:      Head: Normocephalic.      Mouth/Throat:      Mouth: Mucous membranes are moist.   Eyes:      Pupils: Pupils are equal, round, and reactive to light.   Cardiovascular:      Rate and Rhythm: Normal rate.   Pulmonary:      Effort: Pulmonary effort is normal.   Abdominal:      General: Abdomen is flat.   Musculoskeletal:         General: Normal range of motion.   Neurological:      General: No focal deficit present.      Mental Status: He is alert.   Psychiatric:         Mood and Affect: Mood normal.          LABORATORY DATA:   No results found for: \"WBC\", \"HEMOGLOBIN\", \"HEMATOCRIT\", \"MCV\", \"PLATELETCT\", \"NEUTS\"   No results found for: \"SODIUM\", \"POTASSIUM\", \"BUN\", \"CREATININE\", \"CALCIUM\", \"ALBUMIN\", \"ASTSGOT\", \"ALKPHOSPHAT\", \"IFNOTAFR\", \"LDHTOTAL\"    RADIOLOGY DATA:  DH-[id number]-LPCQV BASE TO MID-THIGH    Result Date: 10/31/2023  10/31/2023 3:05 PM HISTORY/REASON FOR EXAM:  History of colorectal cancer, new pulmonary nodule.  Assess for metastatic disease. TECHNIQUE/EXAM DESCRIPTION AND NUMBER OF VIEWS: PET body imaging. Initially, 10.03 mCi F-18 FDG was administered intravenously under standardized conditions. Approximately 45 minutes after FDG administration, the patient was placed in the supine position on the PET CT table. Blood glucose level was 117 mg/dL. Low dose spiral CT imaging was performed from the skull base to the mid thighs. PET imaging was then performed from the skull base to the mid thighs. CT images, PET images, and PET/CT fused images were reviewed on a PACS 3D workstation. The limited non-contrast CT data are used primarily for attenuation correction and anatomic correlation.  Evaluation of solid organs and bowel are especially limited " utilizing this technique. COMPARISON: 7/27/2023 FINDINGS: Head and neck: No abnormal focal FDG activity. Chest: No abnormal activity in the mediastinum.  Small focus of increased activity in the posterior RIGHT mid lung corresponding to nodule in the superior segment RIGHT lower lobe, max SUV 6.11.  No abnormal activity in the LEFT lung. Abdomen and pelvis: No abnormal activity in the liver or spleen.  Expected urinary activity.  His logic bowel uptake. Musculoskeletal: Degenerative uptake in LEFT shoulder and both hips.  No evidence of bony metastatic disease. Incidental findings on CT: Ill-defined nodule in the superior segment RIGHT lower lobe measuring 10 mm.  Coronary artery calcifications.  Gallstone noted.  LEFT mid abdominal stoma.  Eccentric wall thickening of the bladder on the RIGHT, possibly related  to reconstruction.  Mass is not excluded.  Severe degenerative change of both hips and LEFT shoulder.     1.  Focal uptake corresponding to ill-defined nodule in the superior segment RIGHT lower lobe may indicate primary or metastatic lesion. 2.  No other evidence of metastatic disease. 3.  Wall thickening of the bladder on the RIGHT side of uncertain significance, possibly secondary to prior reconstruction.  Correlate with surgical history.      IMPRESSION:    A 73 y.o. with  Visit Diagnoses     ICD-10-CM   1. Rectal cancer (HCC)  C20   2. Metastatic lung cancer (metastasis from lung to other site), right (HCC)  C34.91     Rectal cancer (HCC)  Staging form: Colon and Rectum, AJCC 8th Edition  - Pathologic stage from 11/27/2023: Stage ARABELLA (rpT2, pN1c, cM1a) - Signed by Elver Salas M.D. on 11/27/2023  Stage prefix: Recurrence  Total positive nodes: 0        RECOMMENDATIONS:   I discussed the role of SBRT for oligo metastatic recurrent rectal cancer to the lung.  We will plan for 50 Gray in 5 fractions with integrated boost to 60 Gray in 5 fractions with a PET avid right lower lobe lesion and we will  continue to watch the other right upper lobe nodules that were not PET avid.  We discussed risk and benefits patient is amenable to treatment and will undergo radiation mapping today with plan to start treatment next week.    Thank you for the opportunity to participate in his care.  If any questions or comments, please do not hesitate in calling.    Orders Placed This Encounter    Rad Onc Treatment Planning CT Simulation    Referral to Oncology Psychosocial Screening for Distress    acetaminophen (TYLENOL) 325 MG Tab    cyclobenzaprine (FLEXERIL) 10 mg Tab    lidocaine (LIDODERM) 5 % Patch    diclofenac sodium (VOLTAREN) 1 % Gel    pregabalin (LYRICA) 75 MG Cap    rosuvastatin (CRESTOR) 10 MG Tab    oxybutynin SR (DITROPAN-XL) 5 MG TABLET SR 24 HR    apixaban (ELIQUIS) 5mg Tab    dilTIAZem (CARDIZEM) 120 MG Tab

## 2023-11-27 NOTE — CT SIMULATION
PATIENT NAME Sebastian Sinha   PRIMARY PHYSICIAN Pcp Pt States None [phone]   REFERRING PHYSICIAN Sher Huston A.P* 1950     Lung metastasis       Treatment Planning CT Simulation        Order Questions       Question Answer    Is this for a new course of treatment? Yes    Is this an Addendum? No    Implanted Device/Pacemaker No    Simulation Status Initial    Planned Start Date 12/4/2023    Treatment Site Lung    Laterality Right    Treatment Technique SBRT    Treatment Pattern/Frequency Q OD    Concurrent Chemotherapy No    CT Technique 3D     4D    Slice Thickness 2mm    Scan Extent Chest    Treatment Device(s) Body Fix    Patient Attire Gown    Patient Position Supine    Patient Orientation Head First    Arm Position Up    Treatment Machine TB1 - STx    Image Guidance Match PTV - Soft Tissue     Bone    Fiducial Tracking Fluoro    Treatment Planning Image Fusion CT/CT    Special Physics Consult Stereotactic    Other Orders Special Tx Procedure     Weekly Physics Check

## 2023-11-28 ENCOUNTER — PATIENT OUTREACH (OUTPATIENT)
Dept: ONCOLOGY | Facility: MEDICAL CENTER | Age: 73
End: 2023-11-28
Payer: COMMERCIAL

## 2023-11-28 DIAGNOSIS — Z65.8 PSYCHOSOCIAL DISTRESS: ICD-10-CM

## 2023-11-28 NOTE — PROGRESS NOTES
Oncology nurse navigator called patient to follow up on a referral received for radiation therapy.  TAMMY left a voicemail with my contact information.

## 2023-11-28 NOTE — PROGRESS NOTES
"[facility]cology [facility] Intake    Diagnosis Type: \"Colorectal\"   Preferred Language:English  Insurance: \"[facility]\"  Dental Insurance:No; Last Appointment Date:\"8 years ago\"   Primary Care Provider Reviewed: yes  Last Appointment Date:\"Dr. Perla about 2-3 weeks ago\"  Introduced Sebastian Sinha to Oncology Support Services and provided contact information on 11/28/2023 .  Patient Care Team: \"Sascha Huston [facility]\"  Current Barriers Identified Include:None at this time.  MyChart Status: Pending Activation  Communication Preferences:Phone calls; Best Contact Number: [phone]  Patient Contact's Reviewed: yes     Outbound call to patient for AMANDA intake.Patient states he has friends and \"people at the [facility]\" as his support system. Educated pt. On [employer]/events for Cancer Support Group & Classes, reviewed the Resource Center, and informed patient that I would mail TAMYM Ricardo's and ANDRIA Cortes's card for contact information, address verified. Administered Distress screening, patient scored a four stating \"concern not overriding. Dealing with arthritis in hips\". Encouraged patient to reach out to physician for pain concerns, patient stated [facility] is [facility]. Patient stated TAMMY Ricardo left a voice message, transferred patient to TAMMY Ricardo.     Mailed patient AMANDA resource folder.     Outcome:  No further needs at this time.         "

## 2023-11-28 NOTE — RADIATION COMPLETION NOTES
Clinical Treatment Planning Note    DATE OF SERVICE: 11/27/2023    DIAGNOSIS:  Rectal cancer (HCC)  Staging form: Colon and Rectum, AJCC 8th Edition  - Pathologic stage from 11/27/2023: Stage ARABELLA (rpT2, pN1c, cM1a) - Signed by Elver Salas M.D. on 11/27/2023  Stage prefix: Recurrence  Total positive nodes: 0         IMAGING REVIEWED:  [] CT     [] MRI     [x] PET/CT     [] BONE SCAN     [] MAMMO     [] OTHER      TREATMENT INTENT:   [] CURATIVE     [] MAINTENANCE     []  PALLIATIVE      []  SUPPORTIVE     []  PROPHYLACTIC     [] BENIGN     []  CONSOLIDATIVE      [] DEFINITIVE   [x]  OLOGIMETASTATIC      LINE OF TREATMENT:  [] ADJUVANT   [x] DEFINITIVE   [] NEOADJUVANT   [] RE-TREATMENT      TECHNIQUE PLANNED:  [] IMRT   [] 3D   [x] SBRT   [] SRS/SRT   [] HDR   [] ELECTRON       IMRT JUSTIFICATION:  []   An immediately adjacent area has been previously irradiated and abutting portals must be established with high precision.    []  Dose escalation is planned to deliver radiation doses in excess of those commonly utilized for similar tumors with conventional treatment.    []  The target volume is concave or convex, and the critical normal tissues are within or around that convexity or concavity.    []  The target volume is in close proximity to critical structures that must be protected.    []  The volume of interest must be covered with narrow margins to adequately protect  immediately adjacent structures.      FIELDS & BLOCKING:  [x] COMPLEX BLOCKS     []  = 3 TX AREAS     [x]  ARCS     []  CUSTOM SHEILD        []  SIMPLE BLOCK      CHEMOTHERAPY:  []  CONCURRENT     []  INDUCTION     [] SEQUENTIAL     []  <30 DAYS FROM XRT      NOTES:  OAR CONSTRAINTS: (GUIDELINES ONLY NOT ABSOLUTE)   QUANTEC OR AS STATED  Critical Structure Dose/fx Volume Dose Max Dose Protocol   Brachial Plexus 10-12 Gy x5 3 cc 6 Gy/fx  813   Brachial Plexus 10-12 Gy x5   6.4 Gy/fx 813   Brachial Plexus 20 Gy x3   8 Gy/fx 618   Bronchus/Trachea  10-12 Gy x5 4cc 3.6 Gy/fx 105%    Bronchus/Trachea 10-12 Gy x5   105%    Bronchus/Trachea 20 Gy x3   8 Gy/fx 618   Esophagus, nonadjacent wall 10-12 Gy x5 5 cc 5.5 Gy/fx  813   Esophagus, nonadjacent wall 10-12 Gy x5   105%    Esophagus 20 Gy x3   9 Gy/fx 618   Great Vessels 10-12 Gy x5 10 cc 9.4 Gy/fx  813   Great Vessels 10-12 Gy x5   105%    Heart 10-12 Gy x5 15 cc 5.5 Gy/fx  813   Heart 10-12 Gy x5   105%    Heart 20 Gy x3   8 Gy/fx 618   Lungs, total 10-12 Gy x5 1500 cc 2.5 Gy/fx  813   Lungs, total 10-12 Gy x5 1000 cc 2.7 Gy/fx  813   Lungs, total 20 Gy x3 V20 10% 8 Gy/fx 618   Skin 10-12 Gy x5 10 cc 6 Gy/fx  813   Skin 10-12 Gy x5   6.4 Gy/fx 813   Skin 20 Gy x3   8 Gy/fx 618   Spinal Cord 10-12 Gy x5 0.25 cc 4.5 Gy/fx  813   Spinal Cord 10-12 Gy x5 0.5 cc 2.7 Gy/fx  813   Spinal Cord 10-12 Gy x5   6 Gy/fx 813   Spinal Cord 12 Gy x4 0.35 cc 5.2 Gy/fx  915   Spinal Cord 12 Gy x4 1.2 cc 3.4 Gy/fx  915   Spinal Cord 20 Gy x3   6 Gy/fx 618

## 2023-11-28 NOTE — RADIATION COMPLETION NOTES
DATE OF SERVICE: 11/27/2023    DIAGNOSIS:  Rectal cancer (HCC)  Staging form: Colon and Rectum, AJCC 8th Edition  - Pathologic stage from 11/27/2023: Stage ARABELLA (rpT2, pN1c, cM1a) - Signed by Elver Salas M.D. on 11/27/2023  Stage prefix: Recurrence  Total positive nodes: 0       DATE OF SERVICE: 11/27/2023    TYPE OF SIMULATION: SBRT    GOAL OF TREATMENT:   [] Curative  [] Palliative  [x] Oligometastatic    CONTRAST:    [] IV Contrast*  [] Oral Contrast               POSITION:    [x]  Supine  [] Prone     IMMOBILIZATION DEVICE: [x]  Body-Fix  [] Omniboard  []  Bellyboard    PROCEDURE: Patient placed in immobilization device. 4D gated and non-gated CT obtained to assess organ motion.  Images viewed and approved.  Images reconstructed as need.  Image data sets transferred to [employer] for planning.    I have personally reviewed the relevant data, performed the target localization, and determined all relevant factors for this patient’s simulation.    *Omnipaque 80 -100cc IVP in conjunction with 500cc NS

## 2023-11-28 NOTE — RADIATION COMPLETION NOTES
PATIENT NAME Sebastian Sinha   PRIMARY PHYSICIAN Pcp Pt States None [phone]   REFERRING PHYSICIAN No ref. provider found 1950     DATE OF SERVICE: 11/27/2023    DIAGNOSIS:  Rectal cancer (HCC)  Staging form: Colon and Rectum, AJCC 8th Edition  - Pathologic stage from 11/27/2023: Stage ARABELLA (rpT2, pN1c, cM1a) - Signed by Elver Salas M.D. on 11/27/2023  Stage prefix: Recurrence  Total positive nodes: 0         SPECIAL TREATMENT PROCEDURE NOTE:  Considerable additional effort required in the management of this case because of administration of Stereotactic Radiotherapy, which may result in increased normal tissue toxicity and require greater effort in contouring and treatment because of greater precision.

## 2023-11-29 PROCEDURE — 77334 RADIATION TREATMENT AID(S): CPT | Performed by: RADIOLOGY

## 2023-11-29 PROCEDURE — 77300 RADIATION THERAPY DOSE PLAN: CPT | Mod: 26 | Performed by: RADIOLOGY

## 2023-11-29 PROCEDURE — 77293 RESPIRATOR MOTION MGMT SIMUL: CPT | Mod: 26 | Performed by: RADIOLOGY

## 2023-11-29 PROCEDURE — 77300 RADIATION THERAPY DOSE PLAN: CPT | Performed by: RADIOLOGY

## 2023-11-29 PROCEDURE — 77295 3-D RADIOTHERAPY PLAN: CPT | Performed by: RADIOLOGY

## 2023-11-29 PROCEDURE — 77334 RADIATION TREATMENT AID(S): CPT | Mod: 26 | Performed by: RADIOLOGY

## 2023-11-29 PROCEDURE — 77293 RESPIRATOR MOTION MGMT SIMUL: CPT | Performed by: RADIOLOGY

## 2023-11-29 PROCEDURE — 77295 3-D RADIOTHERAPY PLAN: CPT | Mod: 26 | Performed by: RADIOLOGY

## 2023-11-30 ENCOUNTER — PATIENT OUTREACH (OUTPATIENT)
Dept: ONCOLOGY | Facility: MEDICAL CENTER | Age: 73
End: 2023-11-30
Payer: COMMERCIAL

## 2023-11-30 NOTE — PROGRESS NOTES
Oncology nurse navigator called patient to follow up on a referral received by radiation.  TAMMY introduced self my role and services and the entire support team.  Patient denies any barriers at this time.  Patient is struggling with the pain in his hips and is [facility].

## 2023-12-01 ENCOUNTER — HOSPITAL ENCOUNTER (OUTPATIENT)
Dept: RADIATION ONCOLOGY | Facility: MEDICAL CENTER | Age: 73
End: 2023-12-31
Attending: RADIOLOGY
Payer: COMMERCIAL

## 2023-12-01 PROCEDURE — 77370 RADIATION PHYSICS CONSULT: CPT | Performed by: RADIOLOGY

## 2023-12-02 ENCOUNTER — HOSPITAL ENCOUNTER (OUTPATIENT)
Dept: RADIOLOGY | Facility: MEDICAL CENTER | Age: 73
End: 2023-12-02
Payer: COMMERCIAL

## 2023-12-04 ENCOUNTER — HOSPITAL ENCOUNTER (OUTPATIENT)
Dept: RADIATION ONCOLOGY | Facility: MEDICAL CENTER | Age: 73
End: 2023-12-04

## 2023-12-04 LAB
CHEMOTHERAPY INFUSION START DATE: NORMAL
CHEMOTHERAPY RECORDS: 12
CHEMOTHERAPY RECORDS: 6000
CHEMOTHERAPY RECORDS: NORMAL
CHEMOTHERAPY RX CANCER: NORMAL
DATE 1ST CHEMO CANCER: NORMAL
RAD ONC ARIA COURSE LAST TREATMENT DATE: NORMAL
RAD ONC ARIA COURSE TREATMENT ELAPSED DAYS: NORMAL
RAD ONC ARIA REFERENCE POINT DOSAGE GIVEN TO DATE: 12
RAD ONC ARIA REFERENCE POINT ID: NORMAL
RAD ONC ARIA REFERENCE POINT SESSION DOSAGE GIVEN: 12

## 2023-12-04 PROCEDURE — 77280 THER RAD SIMULAJ FIELD SMPL: CPT | Performed by: RADIOLOGY

## 2023-12-04 PROCEDURE — 77373 STRTCTC BDY RAD THER TX DLVR: CPT | Performed by: RADIOLOGY

## 2023-12-04 PROCEDURE — 77280 THER RAD SIMULAJ FIELD SMPL: CPT | Mod: 26 | Performed by: RADIOLOGY

## 2023-12-04 PROCEDURE — 77435 SBRT MANAGEMENT: CPT | Performed by: RADIOLOGY

## 2023-12-06 ENCOUNTER — HOSPITAL ENCOUNTER (OUTPATIENT)
Dept: RADIATION ONCOLOGY | Facility: MEDICAL CENTER | Age: 73
End: 2023-12-06
Payer: COMMERCIAL

## 2023-12-06 LAB
CHEMOTHERAPY INFUSION START DATE: NORMAL
CHEMOTHERAPY RECORDS: 12
CHEMOTHERAPY RECORDS: 6000
CHEMOTHERAPY RECORDS: NORMAL
CHEMOTHERAPY RX CANCER: NORMAL
DATE 1ST CHEMO CANCER: NORMAL
RAD ONC ARIA COURSE LAST TREATMENT DATE: NORMAL
RAD ONC ARIA COURSE TREATMENT ELAPSED DAYS: NORMAL
RAD ONC ARIA REFERENCE POINT DOSAGE GIVEN TO DATE: 24
RAD ONC ARIA REFERENCE POINT ID: NORMAL
RAD ONC ARIA REFERENCE POINT SESSION DOSAGE GIVEN: 12

## 2023-12-06 PROCEDURE — 77373 STRTCTC BDY RAD THER TX DLVR: CPT | Performed by: RADIOLOGY

## 2023-12-06 PROCEDURE — 77280 THER RAD SIMULAJ FIELD SMPL: CPT | Mod: 26 | Performed by: RADIOLOGY

## 2023-12-06 PROCEDURE — 77280 THER RAD SIMULAJ FIELD SMPL: CPT | Performed by: RADIOLOGY

## 2023-12-08 ENCOUNTER — HOSPITAL ENCOUNTER (OUTPATIENT)
Dept: RADIATION ONCOLOGY | Facility: MEDICAL CENTER | Age: 73
End: 2023-12-08
Payer: COMMERCIAL

## 2023-12-08 LAB
CHEMOTHERAPY INFUSION START DATE: NORMAL
CHEMOTHERAPY RECORDS: 12
CHEMOTHERAPY RECORDS: 6000
CHEMOTHERAPY RECORDS: NORMAL
CHEMOTHERAPY RX CANCER: NORMAL
DATE 1ST CHEMO CANCER: NORMAL
RAD ONC ARIA COURSE LAST TREATMENT DATE: NORMAL
RAD ONC ARIA COURSE TREATMENT ELAPSED DAYS: NORMAL
RAD ONC ARIA REFERENCE POINT DOSAGE GIVEN TO DATE: 36
RAD ONC ARIA REFERENCE POINT ID: NORMAL
RAD ONC ARIA REFERENCE POINT SESSION DOSAGE GIVEN: 12

## 2023-12-08 PROCEDURE — 77373 STRTCTC BDY RAD THER TX DLVR: CPT | Performed by: RADIOLOGY

## 2023-12-08 PROCEDURE — 77336 RADIATION PHYSICS CONSULT: CPT | Mod: XU | Performed by: RADIOLOGY

## 2023-12-08 PROCEDURE — 77280 THER RAD SIMULAJ FIELD SMPL: CPT | Performed by: RADIOLOGY

## 2023-12-08 PROCEDURE — 77280 THER RAD SIMULAJ FIELD SMPL: CPT | Mod: 26 | Performed by: RADIOLOGY

## 2023-12-12 ENCOUNTER — HOSPITAL ENCOUNTER (OUTPATIENT)
Dept: RADIATION ONCOLOGY | Facility: MEDICAL CENTER | Age: 73
End: 2023-12-12

## 2023-12-12 LAB
CHEMOTHERAPY INFUSION START DATE: NORMAL
CHEMOTHERAPY RECORDS: 12
CHEMOTHERAPY RECORDS: 6000
CHEMOTHERAPY RECORDS: NORMAL
CHEMOTHERAPY RX CANCER: NORMAL
DATE 1ST CHEMO CANCER: NORMAL
RAD ONC ARIA COURSE LAST TREATMENT DATE: NORMAL
RAD ONC ARIA COURSE TREATMENT ELAPSED DAYS: NORMAL
RAD ONC ARIA REFERENCE POINT DOSAGE GIVEN TO DATE: 48
RAD ONC ARIA REFERENCE POINT ID: NORMAL
RAD ONC ARIA REFERENCE POINT SESSION DOSAGE GIVEN: 12

## 2023-12-12 PROCEDURE — 77280 THER RAD SIMULAJ FIELD SMPL: CPT | Mod: 26 | Performed by: RADIOLOGY

## 2023-12-12 PROCEDURE — 77280 THER RAD SIMULAJ FIELD SMPL: CPT | Performed by: RADIOLOGY

## 2023-12-12 PROCEDURE — 77373 STRTCTC BDY RAD THER TX DLVR: CPT | Performed by: RADIOLOGY

## 2023-12-12 NOTE — PROCEDURES
DATE OF SERVICE: 12/12/2023    DIAGNOSIS:  Rectal cancer (HCC)  Staging form: Colon and Rectum, AJCC 8th Edition  - Pathologic stage from 11/27/2023: Stage ARABELLA (rpT2, pN1c, cM1a) - Signed by Elver Salas M.D. on 11/27/2023  Stage prefix: Recurrence  Total positive nodes: 0       TREATMENT:  Radiation Therapy Episodes       Active Episodes       Radiation Therapy: SBRT (12/4/2023)                   Radiation Treatments         Plan Last Treated On Elapsed Days Fractions Treated Prescribed Fraction Dose (cGy) Prescribed Total Dose (cGy)    SBRT RLL Lung 12/12/2023 8 @ [id number] 4 of 5 1,200 6,000                  Reference Point Last Treated On Elapsed Days Most Recent Session Dose (cGy) Total Dose (cGy)    SBRT RLL Lung 12/12/2023 8 @ [id number] 1,200 4,800                            STEREOTACTIC PROCEDURE NOTE:    Called by Truebeam machine to verify treatment parameters including:  treatment site, treatment dose, and treatment setup prior to stereotactic treatment..    Patient was placed in the treatment position with use of immobilization device and  laser guidance. CBCT images were acquired for target localization.  Images were reviewed in the axial, coronal, and saggital views and shifts were made as necessary to ensure that patient position matched simulation position.      Treatment delivered per  prescription.  The medical physicist was present throughout the set-up, verification and treatment delivery to oversee the procedure and ensure all parameters agreed with the computerized plan.    I have personally reviewed the relevant data, performed the target localization, and determined all relevant factors for this patient’s simulation.

## 2023-12-14 ENCOUNTER — HOSPITAL ENCOUNTER (OUTPATIENT)
Dept: RADIATION ONCOLOGY | Facility: MEDICAL CENTER | Age: 73
End: 2023-12-14

## 2023-12-14 LAB
CHEMOTHERAPY INFUSION START DATE: NORMAL
CHEMOTHERAPY INFUSION START DATE: NORMAL
CHEMOTHERAPY INFUSION STOP DATE: NORMAL
CHEMOTHERAPY RECORDS: 12
CHEMOTHERAPY RECORDS: 12
CHEMOTHERAPY RECORDS: 6000
CHEMOTHERAPY RECORDS: 6000
CHEMOTHERAPY RECORDS: NORMAL
CHEMOTHERAPY RX CANCER: NORMAL
CHEMOTHERAPY RX CANCER: NORMAL
DATE 1ST CHEMO CANCER: NORMAL
DATE 1ST CHEMO CANCER: NORMAL
RAD ONC ARIA COURSE LAST TREATMENT DATE: NORMAL
RAD ONC ARIA COURSE LAST TREATMENT DATE: NORMAL
RAD ONC ARIA COURSE TREATMENT ELAPSED DAYS: NORMAL
RAD ONC ARIA COURSE TREATMENT ELAPSED DAYS: NORMAL
RAD ONC ARIA REFERENCE POINT DOSAGE GIVEN TO DATE: 60
RAD ONC ARIA REFERENCE POINT DOSAGE GIVEN TO DATE: 60
RAD ONC ARIA REFERENCE POINT ID: NORMAL
RAD ONC ARIA REFERENCE POINT ID: NORMAL
RAD ONC ARIA REFERENCE POINT SESSION DOSAGE GIVEN: 12

## 2023-12-14 PROCEDURE — 77280 THER RAD SIMULAJ FIELD SMPL: CPT | Performed by: RADIOLOGY

## 2023-12-14 PROCEDURE — 77280 THER RAD SIMULAJ FIELD SMPL: CPT | Mod: 26 | Performed by: RADIOLOGY

## 2023-12-14 PROCEDURE — 77373 STRTCTC BDY RAD THER TX DLVR: CPT | Performed by: RADIOLOGY

## 2023-12-14 NOTE — PROCEDURES
DATE OF SERVICE: 12/14/2023    DIAGNOSIS:  Rectal cancer (HCC)  Staging form: Colon and Rectum, AJCC 8th Edition  - Pathologic stage from 11/27/2023: Stage ARABELLA (rpT2, pN1c, cM1a) - Signed by Elver Salas M.D. on 11/27/2023  Stage prefix: Recurrence  Total positive nodes: 0       TREATMENT:  Radiation Therapy Episodes       Active Episodes       Radiation Therapy: SBRT (12/4/2023)                   Radiation Treatments         Plan Last Treated On Elapsed Days Fractions Treated Prescribed Fraction Dose (cGy) Prescribed Total Dose (cGy)    SBRT RLL Lung 12/14/2023 10 @ [id number] 5 of 5 1,200 6,000                  Reference Point Last Treated On Elapsed Days Most Recent Session Dose (cGy) Total Dose (cGy)    SBRT RLL Lung 12/14/2023 10 @ [id number] 1,200 6,000                            STEREOTACTIC PROCEDURE NOTE:    Called by Truebeam machine to verify treatment parameters including:  treatment site, treatment dose, and treatment setup prior to stereotactic treatment..    Patient was placed in the treatment position with use of immobilization device and  laser guidance. CBCT images were acquired for target localization.  Images were reviewed in the axial, coronal, and saggital views and shifts were made as necessary to ensure that patient position matched simulation position.      Treatment delivered per  prescription.  The medical physicist was present throughout the set-up, verification and treatment delivery to oversee the procedure and ensure all parameters agreed with the computerized plan.    I have personally reviewed the relevant data, performed the target localization, and determined all relevant factors for this patient’s simulation.

## 2023-12-15 NOTE — RADIATION COMPLETION NOTES
END OF TREATMENT SUMMARY    Patient name:  Sebastian Sinha    Primary Physician:  Pcp Pt States None MRN: [id number]  CSN: [id number]   Referring physician:  Dr. Huston : 1950, 73 y.o.       TREATMENT SUMMARY:        Course First Treatment Date 2023    Course Last Treatment Date 2023   Course Elapsed Days 10 @ [id number]   Course Intent Curative     Radiation Therapy Episodes       Active Episodes       Radiation Therapy: SBRT (2023)                   Radiation Treatments         Plan Last Treated On Elapsed Days Fractions Treated Prescribed Fraction Dose (cGy) Prescribed Total Dose (cGy)    SBRT RLL Lung 2023 10 @ [id number] 5 of 5 1,200 6,000                  Reference Point Last Treated On Elapsed Days Most Recent Session Dose (cGy) Total Dose (cGy)    SBRT RLL Lung 2023 10 @ [id number] -- 6,000                                     STAGE:   Rectal cancer (HCC)  Staging form: Colon and Rectum, AJCC 8th Edition  - Pathologic stage from 2023: Stage ARABELLA (rpT2, pN1c, cM1a) - Signed by Elver Salas M.D. on 2023  Stage prefix: Recurrence  Total positive nodes: 0       TREATMENT INDICATION:   Lung RLL     CONCURRENT SYSTEMIC TREATMENT:   none     RT COURSE DISCONTINUED EARLY:   No     PATIENT EXPERIENCE:        No data to display                 FOLLOW-UP PLAN:   8 Weeks     COMMENT:          ANATOMIC TARGET SUMMARY    ANATOMIC TARGET MODALITY TECHNIQUE   lung   External beam, photons SBRT            COMMENT:         DIAGRAMS:      DOSE VOLUME HISTOGRAMS:

## 2023-12-15 NOTE — ADDENDUM NOTE
Encounter addended by: Cammie Lerner, Med Ass't on: 12/15/2023 7:10 AM   Actions taken: Pend clinical note

## 2023-12-22 NOTE — ADDENDUM NOTE
Encounter addended by: Elver Salas M.D. on: 12/22/2023 8:24 AM   Actions taken: Clinical Note Signed, Problem List reviewed, Medication List reviewed, Allergies reviewed

## 2024-01-05 ENCOUNTER — HOSPITAL ENCOUNTER (OUTPATIENT)
Dept: RADIATION ONCOLOGY | Facility: MEDICAL CENTER | Age: 74
End: 2024-01-31
Attending: RADIOLOGY
Payer: COMMERCIAL

## 2024-01-05 NOTE — PROGRESS NOTES
Patient is status post SBRT to RLL lung metastasis from rectal cancer 60Gy in 5 fractions completed 12/14/23. Doing well major complaint is hip related to to OA. [facility] CAP and CEA. Can see me on an as needed basis.

## 2024-01-25 ENCOUNTER — PATIENT OUTREACH (OUTPATIENT)
Dept: ONCOLOGY | Facility: MEDICAL CENTER | Age: 74
End: 2024-01-25
Payer: COMMERCIAL

## 2024-01-25 NOTE — PROGRESS NOTES
"On January 25, 2024, Oncology Social Worker Iesha Miner contacted pt. via telephone to follow up on psychosocial distress screening.  OSW Kristofer introduced herself, role and reason for call.  Pt. shared he was \"getting ready to get into cab\" and asked for OSW Kristofer to call him back another time.  OSW Kristofer informed pt. she would call him back at another time.    "

## 2024-04-08 ENCOUNTER — APPOINTMENT (OUTPATIENT)
Dept: RADIOLOGY | Facility: MEDICAL CENTER | Age: 74
End: 2024-04-08
Attending: EMERGENCY MEDICINE
Payer: COMMERCIAL

## 2024-04-08 ENCOUNTER — HOSPITAL ENCOUNTER (EMERGENCY)
Facility: MEDICAL CENTER | Age: 74
End: 2024-04-08
Attending: EMERGENCY MEDICINE
Payer: COMMERCIAL

## 2024-04-08 VITALS
WEIGHT: 230 LBS | DIASTOLIC BLOOD PRESSURE: 80 MMHG | HEART RATE: 77 BPM | RESPIRATION RATE: 18 BRPM | TEMPERATURE: 97.2 F | SYSTOLIC BLOOD PRESSURE: 179 MMHG | HEIGHT: 70 IN | OXYGEN SATURATION: 97 % | BODY MASS INDEX: 32.93 KG/M2

## 2024-04-08 DIAGNOSIS — M79.89 LEG SWELLING: ICD-10-CM

## 2024-04-08 DIAGNOSIS — M25.551 RIGHT HIP PAIN: ICD-10-CM

## 2024-04-08 LAB
ALBUMIN SERPL BCP-MCNC: 4.2 G/DL (ref 3.2–4.9)
ALBUMIN/GLOB SERPL: 1.3 G/DL
ALP SERPL-CCNC: 143 U/L (ref 30–99)
ALT SERPL-CCNC: 32 U/L (ref 2–50)
ANION GAP SERPL CALC-SCNC: 16 MMOL/L (ref 7–16)
AST SERPL-CCNC: 19 U/L (ref 12–45)
BASOPHILS # BLD AUTO: 0.9 % (ref 0–1.8)
BASOPHILS # BLD: 0.08 K/UL (ref 0–0.12)
BILIRUB SERPL-MCNC: 0.4 MG/DL (ref 0.1–1.5)
BUN SERPL-MCNC: 34 MG/DL (ref 8–22)
CALCIUM ALBUM COR SERPL-MCNC: 9.4 MG/DL (ref 8.5–10.5)
CALCIUM SERPL-MCNC: 9.6 MG/DL (ref 8.5–10.5)
CHLORIDE SERPL-SCNC: 101 MMOL/L (ref 96–112)
CO2 SERPL-SCNC: 22 MMOL/L (ref 20–33)
CREAT SERPL-MCNC: 1.45 MG/DL (ref 0.5–1.4)
EKG IMPRESSION: NORMAL
EOSINOPHIL # BLD AUTO: 0.48 K/UL (ref 0–0.51)
EOSINOPHIL NFR BLD: 5.3 % (ref 0–6.9)
ERYTHROCYTE [DISTWIDTH] IN BLOOD BY AUTOMATED COUNT: 43.3 FL (ref 35.9–50)
GFR SERPLBLD CREATININE-BSD FMLA CKD-EPI: 51 ML/MIN/1.73 M 2
GLOBULIN SER CALC-MCNC: 3.3 G/DL (ref 1.9–3.5)
GLUCOSE SERPL-MCNC: 168 MG/DL (ref 65–99)
HCT VFR BLD AUTO: 45.8 % (ref 42–52)
HGB BLD-MCNC: 15.4 G/DL (ref 14–18)
IMM GRANULOCYTES # BLD AUTO: 0.04 K/UL (ref 0–0.11)
IMM GRANULOCYTES NFR BLD AUTO: 0.4 % (ref 0–0.9)
LYMPHOCYTES # BLD AUTO: 1.68 K/UL (ref 1–4.8)
LYMPHOCYTES NFR BLD: 18.5 % (ref 22–41)
MCH RBC QN AUTO: 30.4 PG (ref 27–33)
MCHC RBC AUTO-ENTMCNC: 33.6 G/DL (ref 32.3–36.5)
MCV RBC AUTO: 90.3 FL (ref 81.4–97.8)
MONOCYTES # BLD AUTO: 1.02 K/UL (ref 0–0.85)
MONOCYTES NFR BLD AUTO: 11.2 % (ref 0–13.4)
NEUTROPHILS # BLD AUTO: 5.79 K/UL (ref 1.82–7.42)
NEUTROPHILS NFR BLD: 63.7 % (ref 44–72)
NRBC # BLD AUTO: 0 K/UL
NRBC BLD-RTO: 0 /100 WBC (ref 0–0.2)
PLATELET # BLD AUTO: 218 K/UL (ref 164–446)
PMV BLD AUTO: 11 FL (ref 9–12.9)
POTASSIUM SERPL-SCNC: 3.7 MMOL/L (ref 3.6–5.5)
PROT SERPL-MCNC: 7.5 G/DL (ref 6–8.2)
RBC # BLD AUTO: 5.07 M/UL (ref 4.7–6.1)
SODIUM SERPL-SCNC: 139 MMOL/L (ref 135–145)
TROPONIN T SERPL-MCNC: 29 NG/L (ref 6–19)
WBC # BLD AUTO: 9.1 K/UL (ref 4.8–10.8)

## 2024-04-08 PROCEDURE — 36415 COLL VENOUS BLD VENIPUNCTURE: CPT

## 2024-04-08 PROCEDURE — 93005 ELECTROCARDIOGRAM TRACING: CPT | Performed by: EMERGENCY MEDICINE

## 2024-04-08 PROCEDURE — 93971 EXTREMITY STUDY: CPT | Mod: RT

## 2024-04-08 PROCEDURE — 85025 COMPLETE CBC W/AUTO DIFF WBC: CPT

## 2024-04-08 PROCEDURE — 99284 EMERGENCY DEPT VISIT MOD MDM: CPT

## 2024-04-08 PROCEDURE — 71045 X-RAY EXAM CHEST 1 VIEW: CPT

## 2024-04-08 PROCEDURE — 84484 ASSAY OF TROPONIN QUANT: CPT

## 2024-04-08 PROCEDURE — 80053 COMPREHEN METABOLIC PANEL: CPT

## 2024-04-08 PROCEDURE — 72170 X-RAY EXAM OF PELVIS: CPT

## 2024-04-08 ASSESSMENT — LIFESTYLE VARIABLES: DO YOU DRINK ALCOHOL: NO

## 2024-04-08 NOTE — ED TRIAGE NOTES
Chief Complaint   Patient presents with    Leg Swelling     Right leg swelling x2 weeks. Pt has hx of CHF and arthritis. Pt states he is compliant with medications.     Hip Pain     Right hip pain x2 weeks.       Patient reports 8/10 pain.     Patient wheeled to triage for above complaint. Patient A&Ox4, GCS 15, patient speaking in full sentences. Equal and unlabored respirations. Patient educated on triage process and encouraged to notify staff if condition worsens. Appropriate protocols ordered. Patient returned to the lobby in stable condition.

## 2024-04-09 NOTE — ED NOTES
Pt has been provided DC instructions for hip pain and leg swelling. Follow up with MD ASAP recommended. He was provided new pants as his were soiled. He has been escorted to ED lobby with his walker and all personal items.   He verbalized understanding of DC instructions and paperwork was provided pt patient.

## 2024-04-09 NOTE — ED PROVIDER NOTES
"ED Provider Note    CHIEF COMPLAINT  Chief Complaint   Patient presents with    Leg Swelling     Right leg swelling x2 weeks. Pt has hx of CHF and arthritis. Pt states he is compliant with medications.     Hip Pain     Right hip pain x2 weeks.        EXTERNAL RECORDS REVIEWED  Outpatient Notes office visit from November 2023 with noted bilateral hip pain epidural performed    HPI/ROS  LIMITATION TO HISTORY   Select: : None  OUTSIDE HISTORIAN(S):  none    Sebastian Sinha is a 73 y.o. male who presents with right hip pain.  Patient reports he has some chronic pain to the right hip, [facility], and reports that his hip is \"bone-on-bone\"  He states no real changes in the pain although he was concerned because over the last 2 weeks he began having some increased swelling to his right leg.  He does have some chronic swelling to both of his legs although has seemed worse on the right recently.  He reports no fevers or chills.  He reports no falls or recent trauma.  He reports no chest pain or shortness of breath.  No focal weakness or numbness    PAST MEDICAL HISTORY   has a past medical history of A-fib (HCC), Arthritis, BPH (benign prostatic hyperplasia), Cellulitis, Diabetes (HCC), Kidney disease, Rectal cancer (HCC), and Sleep apnea.    SURGICAL HISTORY   has a past surgical history that includes other abdominal surgery; other; low anterior resection laparoscopic (03/27/2015); colonoscopy; colonoscopy (01/22/2018); cataract extraction with iol (Bilateral, 2020); and toe amputation (Bilateral).    FAMILY HISTORY  Family History   Problem Relation Age of Onset    Cancer Mother         colon cancer    COPD Father        SOCIAL HISTORY  Social History     Tobacco Use    Smoking status: Former     Types: Cigarettes    Smokeless tobacco: Never    Tobacco comments:     \"Rare use 50 years ago, total 3 weeks\"   Substance and Sexual Activity    Alcohol use: No    Drug use: No    Sexual activity: Not on file " "      CURRENT MEDICATIONS  Home Medications       Reviewed by Deysi Pradhan R.N. (Registered Nurse) on 04/08/24 at 1442  Med List Status: Partial     Medication Last Dose Status   acetaminophen (TYLENOL) 325 MG Tab  Active   apixaban (ELIQUIS) 5mg Tab  Active   Ascorbic Acid (VITAMIN C) 1000 MG Tab  Active   Cyanocobalamin (B-12) 1000 MCG Cap  Active   cyclobenzaprine (FLEXERIL) 10 mg Tab  Active   diclofenac sodium (VOLTAREN) 1 % Gel  Active   dilTIAZem (CARDIZEM) 120 MG Tab  Active   lidocaine (LIDODERM) 5 % Patch  Active   loperamide (IMODIUM) 2 MG Cap  Active   Multiple Vitamin (MULTI-VITAMIN DAILY PO)  Active   Omega-3 Fatty Acids (FISH OIL) 1000 MG Cap capsule  Active   oxybutynin SR (DITROPAN-XL) 5 MG TABLET SR 24 HR  Active   polyethylene glycol/lytes (MIRALAX) Pack  Active   pregabalin (LYRICA) 75 MG Cap  Active   PSYLLIUM HUSK PO  Active   rosuvastatin (CRESTOR) 10 MG Tab  Active   tamsulosin (FLOMAX) 0.4 MG capsule  Active                    ALLERGIES  Allergies   Allergen Reactions    Gabapentin Unspecified     \"Dizzy\"       PHYSICAL EXAM  VITAL SIGNS: BP (!) 150/93   Pulse 75   Temp 36.2 °C (97.2 °F) (Temporal)   Resp 16   Ht 1.778 m (5' 10\")   Wt 104 kg (230 lb)   SpO2 96%   BMI 33.00 kg/m²      Pulse ox interpretation: I interpret this pulse ox as normal.  Constitutional: Alert in no apparent distress.  HENT: No signs of trauma, Bilateral external ears normal, Nose normal.   Eyes: Pupils are equal and reactive, Conjunctiva normal, Non-icteric.   Neck: Normal range of motion, No tenderness, Supple, No stridor.   Cardiovascular: Regular rate and rhythm, no murmurs.   Thorax & Lungs: Normal breath sounds, No respiratory distress, No wheezing, No chest tenderness.   Abdomen:  Soft, No tenderness, No masses, No pulsatile masses. No peritoneal signs.  Skin: Warm, Dry, No erythema, No rash.   Back: No bony tenderness, No CVA tenderness.   Extremities: Intact distal pulses, 1+ bilateral " lower extremity edema although right greater than left, No tenderness, No cyanosis,   Musculoskeletal: Patient with some tenderness over the right hip, no swelling, no erythema, no deformity, he does have some pain with logroll and range of motion in that hip as well, nontender through the knee and foot, otherwise good range of motion in all major joints. No major deformities noted.  Distal capillary refill is less than 2 seconds, distal sensation is intact to light touch, extremities warm and well-perfused  Neurologic: Alert , Normal motor function, Normal sensory function, No focal deficits noted.   Psychiatric: Affect normal, Judgment normal, Mood normal.               EKG/LABS  Results for orders placed or performed during the hospital encounter of 04/08/24   CBC with Differential   Result Value Ref Range    WBC 9.1 4.8 - 10.8 K/uL    RBC 5.07 4.70 - 6.10 M/uL    Hemoglobin 15.4 14.0 - 18.0 g/dL    Hematocrit 45.8 42.0 - 52.0 %    MCV 90.3 81.4 - 97.8 fL    MCH 30.4 27.0 - 33.0 pg    MCHC 33.6 32.3 - 36.5 g/dL    RDW 43.3 35.9 - 50.0 fL    Platelet Count 218 164 - 446 K/uL    MPV 11.0 9.0 - 12.9 fL    Neutrophils-Polys 63.70 44.00 - 72.00 %    Lymphocytes 18.50 (L) 22.00 - 41.00 %    Monocytes 11.20 0.00 - 13.40 %    Eosinophils 5.30 0.00 - 6.90 %    Basophils 0.90 0.00 - 1.80 %    Immature Granulocytes 0.40 0.00 - 0.90 %    Nucleated RBC 0.00 0.00 - 0.20 /100 WBC    Neutrophils (Absolute) 5.79 1.82 - 7.42 K/uL    Lymphs (Absolute) 1.68 1.00 - 4.80 K/uL    Monos (Absolute) 1.02 (H) 0.00 - 0.85 K/uL    Eos (Absolute) 0.48 0.00 - 0.51 K/uL    Baso (Absolute) 0.08 0.00 - 0.12 K/uL    Immature Granulocytes (abs) 0.04 0.00 - 0.11 K/uL    NRBC (Absolute) 0.00 K/uL   Complete Metabolic Panel (CMP)   Result Value Ref Range    Sodium 139 135 - 145 mmol/L    Potassium 3.7 3.6 - 5.5 mmol/L    Chloride 101 96 - 112 mmol/L    Co2 22 20 - 33 mmol/L    Anion Gap 16.0 7.0 - 16.0    Glucose 168 (H) 65 - 99 mg/dL    Bun 34 (H) 8  - 22 mg/dL    Creatinine 1.45 (H) 0.50 - 1.40 mg/dL    Calcium 9.6 8.5 - 10.5 mg/dL    Correct Calcium 9.4 8.5 - 10.5 mg/dL    AST(SGOT) 19 12 - 45 U/L    ALT(SGPT) 32 2 - 50 U/L    Alkaline Phosphatase 143 (H) 30 - 99 U/L    Total Bilirubin 0.4 0.1 - 1.5 mg/dL    Albumin 4.2 3.2 - 4.9 g/dL    Total Protein 7.5 6.0 - 8.2 g/dL    Globulin 3.3 1.9 - 3.5 g/dL    A-G Ratio 1.3 g/dL   Troponins NOW   Result Value Ref Range    Troponin T 29 (H) 6 - 19 ng/L   ESTIMATED GFR   Result Value Ref Range    GFR (CKD-EPI) 51 (A) >60 mL/min/1.73 m 2   EKG   Result Value Ref Range    Report       [facility] Emergency Dept.    Test Date:  2024  Pt Name:    CYRUS SAAB                 Department: ER  MRN:        [id number]                      Room:  Gender:     Male                         Technician: [id number]  :        1950                   Requested By:MITCHEL MUELLER  Order #:    [id number]                    Reading MD: RAYMUNDO HUITRON MD    Measurements  Intervals                                Axis  Rate:       83                           P:          0  NJ:         0                            QRS:        -45  QRSD:       117                          T:          78  QT:         371  QTc:        436    Interpretive Statements  Atrial fibrillation  Left anterior fascicular block  No previous ECG available for comparison  Electronically Signed On 2024 20:10:24 PDT by RAYMUNDO HUITRON MD       I have independently interpreted this EKG    RADIOLOGY  I have independently interpreted the diagnostic imaging associated with this visit and am waiting the final reading from the radiologist.   My preliminary interpretation is as follows: No fracture    Radiologist interpretation:  US-EXTREMITY VENOUS LOWER UNILAT RIGHT   Final Result      DX-PELVIS-1 OR 2 VIEWS   Final Result      1.  No acute fracture or dislocation.   2.  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      DX-CHEST-PORTABLE (1 VIEW)   Final Result      1.  No acute cardiac or pulmonary abnormalities are identified.          COURSE & MEDICAL DECISION MAKING    ASSESSMENT, COURSE AND PLAN  Care Narrative: 615 PM  Patient is evaluated the bedside and chart is reviewed, at this point consideration for DVT and will evaluate with ultrasound, he does have some chronic pain to the hip although consideration for progression of his arthritis or potential for fracture which does seem less likely given his presentation today, x-rays have been ordered, consideration for electrolyte or renal dysfunction.  Will check diagnostic labs.  He has no fevers or other infectious symptoms and he has no obvious findings of cellulitis or other infectious source on exam    Patient is reevaluated, discussed all results with him.  He is comfortable with discharge            PROBLEMS MANAGED  # Leg pain.  Patient with chronic hip pain presenting with the same as well as some swelling.  He does not appear to have any acute or emergent pathology at this time.  X-rays show no fracture dislocation does have significant degenerative changes.  [facility] and will [facility]up there for ongoing management of this.    # Edema, patient was a mild lower extremity edema.  I did obtain ultrasound to evaluate for DVT which was negative.  He has no findings of pulmonary edema or other volume overload.  In discussion with the patient he has known renal disease but no findings of significant renal failure at this time.  No electrolyte or metabolic disturbance.  No infectious findings    ADDITIONAL PROBLEMS MANAGED  # Atrial fibrillation, history of.  No findings of cardiac decompensation RVR, he is already anticoagulated.  Does have troponin of 29 which I think is probably more baseline for the patient given his atrial fibrillation and renal disease and he has no actual  cardiopulmonary symptoms, no chest pain, shortness of breath or similar    DISPOSITION AND DISCUSSIONS    Barriers to care at this time, including but not limited to: Patient lacks transportation .     Decision tools and prescription drugs considered including, but not limited to: Pain Medications considered however patient does have close follow-up with pain management will follow-up there for ongoing pain management needs.  He has declined need for pain medication here       The patient will return for new or worsening symptoms and is stable at the time of discharge.    The patient is referred to a primary physician for blood pressure management, diabetic screening, and for all other preventative health concerns.        DISPOSITION:  Patient will be discharged home in stable condition.    FOLLOW UP:  With your primary care doctor and pain management doctor            OUTPATIENT MEDICATIONS:  New Prescriptions    No medications on file         FINAL DIAGNOSIS  1. Right hip pain    2. Leg swelling           Electronically signed by: Radu Huston M.D., 4/8/2024 6:09 PM

## 2024-04-09 NOTE — ED NOTES
Pt to room 57 via w/c. Pt assisted onto gurney. ER tech at bedside placing PIV and drawing labs as labs were not collected while in waiting room.

## 2024-08-12 PROBLEM — M16.11 ARTHRITIS OF RIGHT HIP: Status: ACTIVE | Noted: 2024-08-12

## 2025-02-22 ENCOUNTER — APPOINTMENT (OUTPATIENT)
Dept: RADIOLOGY | Facility: MEDICAL CENTER | Age: 75
End: 2025-02-22
Attending: STUDENT IN AN ORGANIZED HEALTH CARE EDUCATION/TRAINING PROGRAM
Payer: COMMERCIAL

## 2025-02-22 ENCOUNTER — APPOINTMENT (OUTPATIENT)
Dept: RADIOLOGY | Facility: MEDICAL CENTER | Age: 75
End: 2025-02-22
Attending: EMERGENCY MEDICINE
Payer: COMMERCIAL

## 2025-02-22 ENCOUNTER — HOSPITAL ENCOUNTER (INPATIENT)
Facility: MEDICAL CENTER | Age: 75
LOS: 4 days | End: 2025-02-26
Attending: EMERGENCY MEDICINE | Admitting: HOSPITALIST
Payer: COMMERCIAL

## 2025-02-22 DIAGNOSIS — R65.20 SEPSIS WITH ENCEPHALOPATHY WITHOUT SEPTIC SHOCK, DUE TO UNSPECIFIED ORGANISM (HCC): ICD-10-CM

## 2025-02-22 DIAGNOSIS — B96.20 BACTEREMIA, ESCHERICHIA COLI: ICD-10-CM

## 2025-02-22 DIAGNOSIS — M16.11 ARTHRITIS OF RIGHT HIP: ICD-10-CM

## 2025-02-22 DIAGNOSIS — A41.9 SEPSIS WITH ENCEPHALOPATHY WITHOUT SEPTIC SHOCK, DUE TO UNSPECIFIED ORGANISM (HCC): ICD-10-CM

## 2025-02-22 DIAGNOSIS — G93.41 SEPSIS WITH ENCEPHALOPATHY WITHOUT SEPTIC SHOCK, DUE TO UNSPECIFIED ORGANISM (HCC): ICD-10-CM

## 2025-02-22 DIAGNOSIS — R78.81 BACTEREMIA, ESCHERICHIA COLI: ICD-10-CM

## 2025-02-22 PROBLEM — N40.0 BENIGN PROSTATIC HYPERPLASIA WITHOUT LOWER URINARY TRACT SYMPTOMS: Status: ACTIVE | Noted: 2025-02-22

## 2025-02-22 PROBLEM — Z79.01 CHRONIC ANTICOAGULATION: Status: ACTIVE | Noted: 2025-02-22

## 2025-02-22 PROBLEM — I10 PRIMARY HYPERTENSION: Status: ACTIVE | Noted: 2025-02-22

## 2025-02-22 PROBLEM — N17.9 AKI (ACUTE KIDNEY INJURY) (HCC): Status: ACTIVE | Noted: 2025-02-22

## 2025-02-22 PROBLEM — N30.00 ACUTE CYSTITIS WITHOUT HEMATURIA: Status: ACTIVE | Noted: 2025-02-22

## 2025-02-22 LAB
ALBUMIN SERPL BCP-MCNC: 2.9 G/DL (ref 3.2–4.9)
ALBUMIN/GLOB SERPL: 0.6 G/DL
ALP SERPL-CCNC: 108 U/L (ref 30–99)
ALT SERPL-CCNC: 34 U/L (ref 2–50)
ANION GAP SERPL CALC-SCNC: 15 MMOL/L (ref 7–16)
APPEARANCE UR: ABNORMAL
AST SERPL-CCNC: 26 U/L (ref 12–45)
BACTERIA #/AREA URNS HPF: ABNORMAL /HPF
BASE EXCESS BLDA CALC-SCNC: -5 MMOL/L (ref -4–3)
BASOPHILS # BLD AUTO: 0.3 % (ref 0–1.8)
BASOPHILS # BLD: 0.06 K/UL (ref 0–0.12)
BILIRUB SERPL-MCNC: 0.7 MG/DL (ref 0.1–1.5)
BILIRUB UR QL STRIP.AUTO: NEGATIVE
BODY TEMPERATURE: 35.8 CENTIGRADE
BUN SERPL-MCNC: 31 MG/DL (ref 8–22)
CALCIUM ALBUM COR SERPL-MCNC: 9.9 MG/DL (ref 8.5–10.5)
CALCIUM SERPL-MCNC: 9 MG/DL (ref 8.5–10.5)
CASTS URNS QL MICRO: ABNORMAL /LPF (ref 0–2)
CHLORIDE SERPL-SCNC: 98 MMOL/L (ref 96–112)
CK SERPL-CCNC: 114 U/L (ref 0–154)
CO2 SERPL-SCNC: 19 MMOL/L (ref 20–33)
COLOR UR: ABNORMAL
CORTIS SERPL-MCNC: 76.5 UG/DL (ref 0–23)
CREAT SERPL-MCNC: 1.8 MG/DL (ref 0.5–1.4)
CREAT UR-MCNC: 71.3 MG/DL
CRP SERPL HS-MCNC: 20.6 MG/DL (ref 0–0.75)
EKG IMPRESSION: NORMAL
EOSINOPHIL # BLD AUTO: 0 K/UL (ref 0–0.51)
EOSINOPHIL NFR BLD: 0 % (ref 0–6.9)
EPITHELIAL CELLS 1715: ABNORMAL /HPF (ref 0–5)
ERYTHROCYTE [DISTWIDTH] IN BLOOD BY AUTOMATED COUNT: 48 FL (ref 35.9–50)
ERYTHROCYTE [SEDIMENTATION RATE] IN BLOOD BY WESTERGREN METHOD: 65 MM/HOUR (ref 0–20)
GFR SERPLBLD CREATININE-BSD FMLA CKD-EPI: 39 ML/MIN/1.73 M 2
GLOBULIN SER CALC-MCNC: 4.6 G/DL (ref 1.9–3.5)
GLUCOSE BLD STRIP.AUTO-MCNC: 157 MG/DL (ref 65–99)
GLUCOSE SERPL-MCNC: 175 MG/DL (ref 65–99)
GLUCOSE UR STRIP.AUTO-MCNC: NEGATIVE MG/DL
HCO3 BLDA-SCNC: 19 MMOL/L (ref 21–28)
HCT VFR BLD AUTO: 40 % (ref 42–52)
HGB BLD-MCNC: 12.8 G/DL (ref 14–18)
IMM GRANULOCYTES # BLD AUTO: 0.23 K/UL (ref 0–0.11)
IMM GRANULOCYTES NFR BLD AUTO: 1 % (ref 0–0.9)
KETONES UR STRIP.AUTO-MCNC: 15 MG/DL
LACTATE SERPL-SCNC: 2 MMOL/L (ref 0.5–2)
LACTATE SERPL-SCNC: 2.2 MMOL/L (ref 0.5–2)
LACTATE SERPL-SCNC: 2.3 MMOL/L (ref 0.5–2)
LACTATE SERPL-SCNC: 2.9 MMOL/L (ref 0.5–2)
LDH SERPL L TO P-CCNC: 225 U/L (ref 107–266)
LEUKOCYTE ESTERASE UR QL STRIP.AUTO: ABNORMAL
LYMPHOCYTES # BLD AUTO: 1.62 K/UL (ref 1–4.8)
LYMPHOCYTES NFR BLD: 7.3 % (ref 22–41)
MAGNESIUM SERPL-MCNC: 1.4 MG/DL (ref 1.5–2.5)
MCH RBC QN AUTO: 27.9 PG (ref 27–33)
MCHC RBC AUTO-ENTMCNC: 32 G/DL (ref 32.3–36.5)
MCV RBC AUTO: 87.3 FL (ref 81.4–97.8)
MICRO URNS: ABNORMAL
MONOCYTES # BLD AUTO: 1.23 K/UL (ref 0–0.85)
MONOCYTES NFR BLD AUTO: 5.5 % (ref 0–13.4)
NEUTROPHILS # BLD AUTO: 19.16 K/UL (ref 1.82–7.42)
NEUTROPHILS NFR BLD: 85.9 % (ref 44–72)
NITRITE UR QL STRIP.AUTO: POSITIVE
NRBC # BLD AUTO: 0 K/UL
NRBC BLD-RTO: 0 /100 WBC (ref 0–0.2)
PCO2 BLDA: 31.3 MMHG (ref 32–48)
PH BLDA: 7.39 [PH] (ref 7.35–7.45)
PH UR STRIP.AUTO: 5.5 [PH] (ref 5–8)
PHOSPHATE SERPL-MCNC: 3.5 MG/DL (ref 2.5–4.5)
PLATELET # BLD AUTO: 239 K/UL (ref 164–446)
PMV BLD AUTO: 10.7 FL (ref 9–12.9)
PO2 BLDA: 154.4 MMHG (ref 83–108)
POTASSIUM SERPL-SCNC: 5.2 MMOL/L (ref 3.6–5.5)
PROCALCITONIN SERPL-MCNC: 32.8 NG/ML
PROT SERPL-MCNC: 7.5 G/DL (ref 6–8.2)
PROT UR QL STRIP: 100 MG/DL
RBC # BLD AUTO: 4.58 M/UL (ref 4.7–6.1)
RBC # URNS HPF: >100 /HPF (ref 0–2)
RBC UR QL AUTO: ABNORMAL
SAO2 % BLDA: 98.7 % (ref 93–99)
SCCMEC + MECA PNL NOSE NAA+PROBE: POSITIVE
SODIUM SERPL-SCNC: 132 MMOL/L (ref 135–145)
SODIUM UR-SCNC: 49 MMOL/L
SP GR UR STRIP.AUTO: 1.02
UROBILINOGEN UR STRIP.AUTO-MCNC: 1 EU/DL
WBC # BLD AUTO: 22.3 K/UL (ref 4.8–10.8)
WBC #/AREA URNS HPF: >100 /HPF

## 2025-02-22 PROCEDURE — 82962 GLUCOSE BLOOD TEST: CPT

## 2025-02-22 PROCEDURE — 87086 URINE CULTURE/COLONY COUNT: CPT

## 2025-02-22 PROCEDURE — 83735 ASSAY OF MAGNESIUM: CPT

## 2025-02-22 PROCEDURE — 81001 URINALYSIS AUTO W/SCOPE: CPT

## 2025-02-22 PROCEDURE — 82803 BLOOD GASES ANY COMBINATION: CPT

## 2025-02-22 PROCEDURE — 700105 HCHG RX REV CODE 258: Performed by: HOSPITALIST

## 2025-02-22 PROCEDURE — 83615 LACTATE (LD) (LDH) ENZYME: CPT

## 2025-02-22 PROCEDURE — 99223 1ST HOSP IP/OBS HIGH 75: CPT | Mod: 25 | Performed by: HOSPITALIST

## 2025-02-22 PROCEDURE — 84300 ASSAY OF URINE SODIUM: CPT

## 2025-02-22 PROCEDURE — 82533 TOTAL CORTISOL: CPT

## 2025-02-22 PROCEDURE — 86738 MYCOPLASMA ANTIBODY: CPT

## 2025-02-22 PROCEDURE — 700102 HCHG RX REV CODE 250 W/ 637 OVERRIDE(OP): Performed by: NURSE PRACTITIONER

## 2025-02-22 PROCEDURE — 84100 ASSAY OF PHOSPHORUS: CPT

## 2025-02-22 PROCEDURE — A9270 NON-COVERED ITEM OR SERVICE: HCPCS | Performed by: STUDENT IN AN ORGANIZED HEALTH CARE EDUCATION/TRAINING PROGRAM

## 2025-02-22 PROCEDURE — 84145 PROCALCITONIN (PCT): CPT

## 2025-02-22 PROCEDURE — 76775 US EXAM ABDO BACK WALL LIM: CPT

## 2025-02-22 PROCEDURE — 700111 HCHG RX REV CODE 636 W/ 250 OVERRIDE (IP): Performed by: HOSPITALIST

## 2025-02-22 PROCEDURE — 96375 TX/PRO/DX INJ NEW DRUG ADDON: CPT

## 2025-02-22 PROCEDURE — 87641 MR-STAPH DNA AMP PROBE: CPT

## 2025-02-22 PROCEDURE — 93005 ELECTROCARDIOGRAM TRACING: CPT | Mod: TC | Performed by: EMERGENCY MEDICINE

## 2025-02-22 PROCEDURE — 700111 HCHG RX REV CODE 636 W/ 250 OVERRIDE (IP): Mod: JZ | Performed by: STUDENT IN AN ORGANIZED HEALTH CARE EDUCATION/TRAINING PROGRAM

## 2025-02-22 PROCEDURE — 86140 C-REACTIVE PROTEIN: CPT

## 2025-02-22 PROCEDURE — 87449 NOS EACH ORGANISM AG IA: CPT

## 2025-02-22 PROCEDURE — 700105 HCHG RX REV CODE 258: Performed by: STUDENT IN AN ORGANIZED HEALTH CARE EDUCATION/TRAINING PROGRAM

## 2025-02-22 PROCEDURE — 70450 CT HEAD/BRAIN W/O DYE: CPT

## 2025-02-22 PROCEDURE — 87186 SC STD MICRODIL/AGAR DIL: CPT

## 2025-02-22 PROCEDURE — 36415 COLL VENOUS BLD VENIPUNCTURE: CPT

## 2025-02-22 PROCEDURE — 71045 X-RAY EXAM CHEST 1 VIEW: CPT

## 2025-02-22 PROCEDURE — 700111 HCHG RX REV CODE 636 W/ 250 OVERRIDE (IP): Performed by: NURSE PRACTITIONER

## 2025-02-22 PROCEDURE — 700111 HCHG RX REV CODE 636 W/ 250 OVERRIDE (IP): Performed by: EMERGENCY MEDICINE

## 2025-02-22 PROCEDURE — 83605 ASSAY OF LACTIC ACID: CPT | Mod: 91

## 2025-02-22 PROCEDURE — 99285 EMERGENCY DEPT VISIT HI MDM: CPT

## 2025-02-22 PROCEDURE — 700105 HCHG RX REV CODE 258: Performed by: EMERGENCY MEDICINE

## 2025-02-22 PROCEDURE — 700102 HCHG RX REV CODE 250 W/ 637 OVERRIDE(OP): Performed by: HOSPITALIST

## 2025-02-22 PROCEDURE — 80053 COMPREHEN METABOLIC PANEL: CPT

## 2025-02-22 PROCEDURE — 85652 RBC SED RATE AUTOMATED: CPT

## 2025-02-22 PROCEDURE — 99291 CRITICAL CARE FIRST HOUR: CPT | Performed by: STUDENT IN AN ORGANIZED HEALTH CARE EDUCATION/TRAINING PROGRAM

## 2025-02-22 PROCEDURE — 87899 AGENT NOS ASSAY W/OPTIC: CPT

## 2025-02-22 PROCEDURE — 51798 US URINE CAPACITY MEASURE: CPT

## 2025-02-22 PROCEDURE — A9270 NON-COVERED ITEM OR SERVICE: HCPCS | Performed by: HOSPITALIST

## 2025-02-22 PROCEDURE — 770020 HCHG ROOM/CARE - TELE (206)

## 2025-02-22 PROCEDURE — 85025 COMPLETE CBC W/AUTO DIFF WBC: CPT

## 2025-02-22 PROCEDURE — 87040 BLOOD CULTURE FOR BACTERIA: CPT

## 2025-02-22 PROCEDURE — A9270 NON-COVERED ITEM OR SERVICE: HCPCS | Performed by: NURSE PRACTITIONER

## 2025-02-22 PROCEDURE — 87015 SPECIMEN INFECT AGNT CONCNTJ: CPT

## 2025-02-22 PROCEDURE — 96365 THER/PROPH/DIAG IV INF INIT: CPT

## 2025-02-22 PROCEDURE — 87077 CULTURE AEROBIC IDENTIFY: CPT

## 2025-02-22 PROCEDURE — 82550 ASSAY OF CK (CPK): CPT

## 2025-02-22 PROCEDURE — 700102 HCHG RX REV CODE 250 W/ 637 OVERRIDE(OP): Performed by: STUDENT IN AN ORGANIZED HEALTH CARE EDUCATION/TRAINING PROGRAM

## 2025-02-22 PROCEDURE — 82570 ASSAY OF URINE CREATININE: CPT

## 2025-02-22 RX ORDER — OXYCODONE HYDROCHLORIDE 5 MG/1
5-10 TABLET ORAL EVERY 4 HOURS PRN
Refills: 0 | Status: DISCONTINUED | OUTPATIENT
Start: 2025-02-22 | End: 2025-02-26 | Stop reason: HOSPADM

## 2025-02-22 RX ORDER — VENLAFAXINE HYDROCHLORIDE 37.5 MG/1
37.5 CAPSULE, EXTENDED RELEASE ORAL DAILY
Status: DISCONTINUED | OUTPATIENT
Start: 2025-02-23 | End: 2025-02-26 | Stop reason: HOSPADM

## 2025-02-22 RX ORDER — CEFUROXIME AXETIL 500 MG/1
500 TABLET ORAL 2 TIMES DAILY
Status: SHIPPED | COMMUNITY
Start: 2025-02-22 | End: 2025-02-22

## 2025-02-22 RX ORDER — TAMSULOSIN HYDROCHLORIDE 0.4 MG/1
0.4 CAPSULE ORAL DAILY
Status: DISCONTINUED | OUTPATIENT
Start: 2025-02-23 | End: 2025-02-26 | Stop reason: HOSPADM

## 2025-02-22 RX ORDER — TRIAMCINOLONE ACETONIDE 1 MG/G
1 CREAM TOPICAL DAILY
COMMUNITY

## 2025-02-22 RX ORDER — METOPROLOL SUCCINATE 25 MG/1
12.5 TABLET, EXTENDED RELEASE ORAL DAILY
Status: ON HOLD | COMMUNITY
End: 2025-02-26

## 2025-02-22 RX ORDER — MULTIVITAMIN WITH IRON
1000 TABLET ORAL DAILY
COMMUNITY

## 2025-02-22 RX ORDER — ACETAMINOPHEN 500 MG
1000 TABLET ORAL EVERY 8 HOURS PRN
COMMUNITY

## 2025-02-22 RX ORDER — ACETAMINOPHEN 10 MG/ML
1000 INJECTION, SOLUTION INTRAVENOUS ONCE
Status: COMPLETED | OUTPATIENT
Start: 2025-02-22 | End: 2025-02-22

## 2025-02-22 RX ORDER — SODIUM CHLORIDE, SODIUM LACTATE, POTASSIUM CHLORIDE, AND CALCIUM CHLORIDE .6; .31; .03; .02 G/100ML; G/100ML; G/100ML; G/100ML
1000 INJECTION, SOLUTION INTRAVENOUS ONCE
Status: COMPLETED | OUTPATIENT
Start: 2025-02-22 | End: 2025-02-22

## 2025-02-22 RX ORDER — LINEZOLID 2 MG/ML
600 INJECTION, SOLUTION INTRAVENOUS EVERY 12 HOURS
Status: DISCONTINUED | OUTPATIENT
Start: 2025-02-23 | End: 2025-02-23

## 2025-02-22 RX ORDER — SODIUM CHLORIDE, SODIUM LACTATE, POTASSIUM CHLORIDE, CALCIUM CHLORIDE 600; 310; 30; 20 MG/100ML; MG/100ML; MG/100ML; MG/100ML
1000 INJECTION, SOLUTION INTRAVENOUS ONCE
Status: COMPLETED | OUTPATIENT
Start: 2025-02-22 | End: 2025-02-22

## 2025-02-22 RX ORDER — METOPROLOL SUCCINATE 25 MG/1
12.5 TABLET, EXTENDED RELEASE ORAL DAILY
Status: DISCONTINUED | OUTPATIENT
Start: 2025-02-23 | End: 2025-02-26 | Stop reason: HOSPADM

## 2025-02-22 RX ORDER — MIDODRINE HYDROCHLORIDE 5 MG/1
10 TABLET ORAL
Status: DISCONTINUED | OUTPATIENT
Start: 2025-02-22 | End: 2025-02-25

## 2025-02-22 RX ORDER — CEFUROXIME AXETIL 500 MG/1
500 TABLET ORAL 2 TIMES DAILY
Status: ON HOLD | COMMUNITY
Start: 2025-02-22 | End: 2025-02-25

## 2025-02-22 RX ORDER — ONDANSETRON 4 MG/1
4 TABLET, ORALLY DISINTEGRATING ORAL EVERY 4 HOURS PRN
Status: DISCONTINUED | OUTPATIENT
Start: 2025-02-22 | End: 2025-02-26 | Stop reason: HOSPADM

## 2025-02-22 RX ORDER — MULTIVITAMIN WITH IRON
1000 TABLET ORAL DAILY
Status: DISCONTINUED | OUTPATIENT
Start: 2025-02-23 | End: 2025-02-26 | Stop reason: HOSPADM

## 2025-02-22 RX ORDER — SITAGLIPTIN 100 MG/1
100 TABLET ORAL DAILY
Status: SHIPPED | COMMUNITY
End: 2025-02-22

## 2025-02-22 RX ORDER — LIDOCAINE 4 G/G
1 PATCH TOPICAL EVERY 24 HOURS
COMMUNITY

## 2025-02-22 RX ORDER — ACETAMINOPHEN 500 MG
1000 TABLET ORAL EVERY 8 HOURS PRN
Status: DISCONTINUED | OUTPATIENT
Start: 2025-02-22 | End: 2025-02-22

## 2025-02-22 RX ORDER — MICONAZOLE NITRATE 2 G/100G
1 POWDER TOPICAL 2 TIMES DAILY
Status: SHIPPED | COMMUNITY
End: 2025-02-22

## 2025-02-22 RX ORDER — SODIUM CHLORIDE, SODIUM LACTATE, POTASSIUM CHLORIDE, CALCIUM CHLORIDE 600; 310; 30; 20 MG/100ML; MG/100ML; MG/100ML; MG/100ML
INJECTION, SOLUTION INTRAVENOUS CONTINUOUS
Status: ACTIVE | OUTPATIENT
Start: 2025-02-22 | End: 2025-02-23

## 2025-02-22 RX ORDER — SODIUM CHLORIDE, SODIUM LACTATE, POTASSIUM CHLORIDE, AND CALCIUM CHLORIDE .6; .31; .03; .02 G/100ML; G/100ML; G/100ML; G/100ML
500 INJECTION, SOLUTION INTRAVENOUS
Status: DISCONTINUED | OUTPATIENT
Start: 2025-02-22 | End: 2025-02-26 | Stop reason: HOSPADM

## 2025-02-22 RX ORDER — ONDANSETRON 2 MG/ML
4 INJECTION INTRAMUSCULAR; INTRAVENOUS EVERY 4 HOURS PRN
Status: DISCONTINUED | OUTPATIENT
Start: 2025-02-22 | End: 2025-02-26 | Stop reason: HOSPADM

## 2025-02-22 RX ORDER — ACETAMINOPHEN 500 MG
1000 TABLET ORAL EVERY 6 HOURS
Status: DISCONTINUED | OUTPATIENT
Start: 2025-02-22 | End: 2025-02-24

## 2025-02-22 RX ORDER — HYDROCORTISONE SODIUM SUCCINATE 100 MG/2ML
100 INJECTION INTRAMUSCULAR; INTRAVENOUS EVERY 8 HOURS
Status: DISCONTINUED | OUTPATIENT
Start: 2025-02-22 | End: 2025-02-23

## 2025-02-22 RX ORDER — INSULIN LISPRO 100 [IU]/ML
2-9 INJECTION, SOLUTION INTRAVENOUS; SUBCUTANEOUS
Status: DISCONTINUED | OUTPATIENT
Start: 2025-02-22 | End: 2025-02-26 | Stop reason: HOSPADM

## 2025-02-22 RX ORDER — CEFAZOLIN 2 G/1
2 INJECTION, POWDER, FOR SOLUTION INTRAMUSCULAR; INTRAVENOUS ONCE
Status: COMPLETED | OUTPATIENT
Start: 2025-02-22 | End: 2025-02-22

## 2025-02-22 RX ORDER — TRIAMCINOLONE ACETONIDE 1 MG/G
1 CREAM TOPICAL DAILY
Status: SHIPPED | COMMUNITY
End: 2025-02-22

## 2025-02-22 RX ORDER — MIDODRINE HYDROCHLORIDE 5 MG/1
5 TABLET ORAL ONCE
Status: COMPLETED | OUTPATIENT
Start: 2025-02-22 | End: 2025-02-22

## 2025-02-22 RX ORDER — BACLOFEN 5 MG/1
2.5 TABLET ORAL 3 TIMES DAILY PRN
Status: DISCONTINUED | OUTPATIENT
Start: 2025-02-22 | End: 2025-02-26 | Stop reason: HOSPADM

## 2025-02-22 RX ORDER — ROSUVASTATIN CALCIUM 10 MG/1
10 TABLET, COATED ORAL DAILY
Status: DISCONTINUED | OUTPATIENT
Start: 2025-02-23 | End: 2025-02-26 | Stop reason: HOSPADM

## 2025-02-22 RX ORDER — DEXTROSE MONOHYDRATE 25 G/50ML
25 INJECTION, SOLUTION INTRAVENOUS
Status: DISCONTINUED | OUTPATIENT
Start: 2025-02-22 | End: 2025-02-26 | Stop reason: HOSPADM

## 2025-02-22 RX ORDER — PREGABALIN 150 MG/1
150 CAPSULE ORAL 2 TIMES DAILY
Status: DISCONTINUED | OUTPATIENT
Start: 2025-02-22 | End: 2025-02-26 | Stop reason: HOSPADM

## 2025-02-22 RX ORDER — VENLAFAXINE HYDROCHLORIDE 37.5 MG/1
37.5 CAPSULE, EXTENDED RELEASE ORAL
Status: ON HOLD | COMMUNITY
End: 2025-03-24

## 2025-02-22 RX ORDER — MAGNESIUM SULFATE HEPTAHYDRATE 40 MG/ML
2 INJECTION, SOLUTION INTRAVENOUS ONCE
Status: COMPLETED | OUTPATIENT
Start: 2025-02-22 | End: 2025-02-22

## 2025-02-22 RX ORDER — BACLOFEN 5 MG/1
2.5 TABLET ORAL 3 TIMES DAILY PRN
COMMUNITY

## 2025-02-22 RX ORDER — KETOROLAC TROMETHAMINE 15 MG/ML
15 INJECTION, SOLUTION INTRAMUSCULAR; INTRAVENOUS ONCE
Status: COMPLETED | OUTPATIENT
Start: 2025-02-22 | End: 2025-02-22

## 2025-02-22 RX ORDER — SITAGLIPTIN 100 MG/1
100 TABLET ORAL DAILY
COMMUNITY

## 2025-02-22 RX ADMIN — AMPICILLIN AND SULBACTAM 3 G: 1; 2 INJECTION, POWDER, FOR SOLUTION INTRAMUSCULAR; INTRAVENOUS at 14:45

## 2025-02-22 RX ADMIN — HYDROCORTISONE SODIUM SUCCINATE 100 MG: 100 INJECTION, POWDER, FOR SOLUTION INTRAMUSCULAR; INTRAVENOUS at 17:45

## 2025-02-22 RX ADMIN — ACETAMINOPHEN 1000 MG: 10 INJECTION, SOLUTION INTRAVENOUS at 12:32

## 2025-02-22 RX ADMIN — SODIUM CHLORIDE, POTASSIUM CHLORIDE, SODIUM LACTATE AND CALCIUM CHLORIDE: 600; 310; 30; 20 INJECTION, SOLUTION INTRAVENOUS at 14:50

## 2025-02-22 RX ADMIN — SODIUM CHLORIDE, POTASSIUM CHLORIDE, SODIUM LACTATE AND CALCIUM CHLORIDE 1000 ML: 600; 310; 30; 20 INJECTION, SOLUTION INTRAVENOUS at 12:32

## 2025-02-22 RX ADMIN — HYDROCORTISONE SODIUM SUCCINATE 100 MG: 100 INJECTION, POWDER, FOR SOLUTION INTRAMUSCULAR; INTRAVENOUS at 21:02

## 2025-02-22 RX ADMIN — AMPICILLIN AND SULBACTAM 3 G: 1; 2 INJECTION, POWDER, FOR SOLUTION INTRAMUSCULAR; INTRAVENOUS at 21:02

## 2025-02-22 RX ADMIN — KETOROLAC TROMETHAMINE 15 MG: 15 INJECTION, SOLUTION INTRAMUSCULAR; INTRAVENOUS at 14:45

## 2025-02-22 RX ADMIN — PREGABALIN 150 MG: 150 CAPSULE ORAL at 21:37

## 2025-02-22 RX ADMIN — SODIUM CHLORIDE, POTASSIUM CHLORIDE, SODIUM LACTATE AND CALCIUM CHLORIDE 1000 ML: 600; 310; 30; 20 INJECTION, SOLUTION INTRAVENOUS at 17:23

## 2025-02-22 RX ADMIN — APIXABAN 5 MG: 5 TABLET, FILM COATED ORAL at 21:02

## 2025-02-22 RX ADMIN — MIDODRINE HYDROCHLORIDE 5 MG: 5 TABLET ORAL at 20:11

## 2025-02-22 RX ADMIN — THIAMINE HYDROCHLORIDE 500 MG: 100 INJECTION, SOLUTION INTRAMUSCULAR; INTRAVENOUS at 18:07

## 2025-02-22 RX ADMIN — CEFAZOLIN 2 G: 2 INJECTION, POWDER, FOR SOLUTION INTRAMUSCULAR; INTRAVENOUS at 12:32

## 2025-02-22 RX ADMIN — MAGNESIUM SULFATE HEPTAHYDRATE 2 G: 2 INJECTION, SOLUTION INTRAVENOUS at 21:51

## 2025-02-22 RX ADMIN — ACETAMINOPHEN 1000 MG: 500 TABLET ORAL at 21:02

## 2025-02-22 RX ADMIN — INSULIN LISPRO 2 UNITS: 100 INJECTION, SOLUTION INTRAVENOUS; SUBCUTANEOUS at 21:42

## 2025-02-22 RX ADMIN — SODIUM CHLORIDE, POTASSIUM CHLORIDE, SODIUM LACTATE AND CALCIUM CHLORIDE 1000 ML: 600; 310; 30; 20 INJECTION, SOLUTION INTRAVENOUS at 17:00

## 2025-02-22 RX ADMIN — INSULIN LISPRO 2 UNITS: 100 INJECTION, SOLUTION INTRAVENOUS; SUBCUTANEOUS at 18:15

## 2025-02-22 RX ADMIN — MIDODRINE HYDROCHLORIDE 10 MG: 5 TABLET ORAL at 18:10

## 2025-02-22 RX ADMIN — VANCOMYCIN HYDROCHLORIDE 2750 MG: 5 INJECTION, POWDER, LYOPHILIZED, FOR SOLUTION INTRAVENOUS at 17:20

## 2025-02-22 ASSESSMENT — CHA2DS2 SCORE
CHF OR LEFT VENTRICULAR DYSFUNCTION: NO
AGE 75 OR GREATER: NO
VASCULAR DISEASE: NO
CHA2DS2 VASC SCORE: 3
PRIOR STROKE OR TIA OR THROMBOEMBOLISM: NO
HYPERTENSION: YES
DIABETES: YES
AGE 65 TO 74: YES
SEX: MALE

## 2025-02-22 ASSESSMENT — FIBROSIS 4 INDEX: FIB4 SCORE: 1.11

## 2025-02-22 ASSESSMENT — PAIN DESCRIPTION - PAIN TYPE: TYPE: ACUTE PAIN

## 2025-02-22 NOTE — ED TRIAGE NOTES
Pt SISSY NOGUEIRA from [facility] for ALOC. Pt has a LLE wound x's 2 weeks and was supposed to start abx today and hasn't as of yet. Pt was last seen normal yesterday and presented altered today with a temp of 100.4 pre hospital. Pt connected to bedside monitor, call light in reach, and protocol orders placed. Awaiting ERP

## 2025-02-22 NOTE — ED NOTES
"Med rec completed per medication dispense history per [facility]. Patient is unable to participate in interview. Ambulance report states that EMS was contacted by \"home care nurse\"; unclear whether this refers to home health or caregiver e.g. at a group home. No paperwork from any group home was sent with the patient, and unable to locate any information online about a group home at address listed on ambulance report ([address], [place] [address]). Unable to confirm times of last doses or recent over-the-counter medications.    Allergies reviewed with [facility].    Outpatient antibiotics within the last 30 days: [facility] states a 7 day course of cefuroxime was dispensed to patient today 2/22/2025. Unable to confirm whether or not patient has started this antibiotic.    ANTICOAGULANTS: Patient is prescribed ELIQUIS. Unknown last dose.  "

## 2025-02-22 NOTE — ASSESSMENT & PLAN NOTE
Likely sepsis induced  Patient has been fluid resuscitated  Monitor her urine output  Daily BMP  Renally dose medications as appropriate  Consider further workup if he does not normalize tomorrow.  History of BPH so low threshold to check postvoid or renal ultrasound

## 2025-02-22 NOTE — PROGRESS NOTES
Pharmacy Vancomycin Kinetics Note for 2/22/2025     74 y.o. male on Vancomycin day # 1     Vancomycin Indication (AUC Dosing): Skin/skin structure infection    Provider specified end date: 02/27/25    Active Antibiotics (From admission, onward)      Ordered     Ordering Provider       Sat Feb 22, 2025  3:07 PM    02/22/25 1507  MD Alert...Vancomycin per Pharmacy  PHARMACY TO DOSE        Question:  Indication(s) for vancomycin?  Answer:  Skin and soft tissue infection    Brian Obrien D.O.       Sat Feb 22, 2025  2:52 PM    02/22/25 1452  vancomycin (Vancocin) 2,750 mg in  mL IVPB  (vancomycin (VANCOCIN) IV (LD + Maintenance))  ONCE         Brian Obrien D.O.       Sat Feb 22, 2025  1:59 PM    02/22/25 1359  ampicillin/sulbactam (Unasyn) 3 g in  mL IVPB  EVERY 6 HOURS         Brian Obrien D.O.            Dosing Weight: 105 kg (231 lb 7.7 oz)      Admission History: Admitted on 2/22/2025 for Acute metabolic encephalopathy [G93.41]  Pertinent history: 73yo M presents with AMS. Hx significant for prostate CA s/p chemo/radiation. Recently prescribed Cefuroxime for SSTI though unable to  outpatient yet. Presents febrile with TMax 103 and leukocytosis. Empiric Vanco initiated.    Allergies:     Gabapentin     Pertinent cultures to date:     Results       Procedure Component Value Units Date/Time    MRSA By PCR (Amp) [[id number]] Collected: 02/22/25 1456    Order Status: Sent Specimen: Respirate from Nares Updated: 02/22/25 1507    Blood Culture - Draw one from central line and one from peripheral site [[id number]] Collected: 02/22/25 1150    Order Status: Completed Specimen: Blood from Peripheral Updated: 02/22/25 1408     Significant Indicator NEG     Source BLD     Site PERIPHERAL     Culture Result No Growth  Note: Blood cultures are incubated for 5 days and  are monitored continuously.Positive blood cultures  are called to the RN and reported as soon as  they are  "identified.      Blood Culture - Draw one from central line and one from peripheral site [[id number]] Collected: 25 1140    Order Status: Completed Specimen: Blood from Line Updated: 25 1408     Significant Indicator NEG     Source BLD     Site Peripheral     Culture Result No Growth  Note: Blood cultures are incubated for 5 days and  are monitored continuously.Positive blood cultures  are called to the RN and reported as soon as  they are identified.      Urinalysis [[id number]]  (Abnormal) Collected: 25 1210    Order Status: Completed Specimen: Urine Updated: 25 1300     Color Orange     Character Turbid     Specific Gravity 1.016     Ph 5.5     Glucose Negative mg/dL      Ketones 15 mg/dL      Protein 100 mg/dL      Bilirubin Negative     Urobilinogen, Urine 1.0 EU/dL      Nitrite Positive     Leukocyte Esterase Large     Occult Blood Large     Micro Urine Req Microscopic    Urine Culture (New) [[id number]] Collected: 25 1210    Order Status: Sent Specimen: Urine Updated: 25 1223            Labs:     Estimated Creatinine Clearance: 45 mL/min (A) (by C-G formula based on SCr of 1.8 mg/dL (H)).  Recent Labs     25  1140   WBC 22.3*   NEUTSPOLYS 85.90*     Recent Labs     25  1140   BUN 31*   CREATININE 1.80*   ALBUMIN 2.9*       Intake/Output Summary (Last 24 hours) at 2025 1523  Last data filed at 2025 1329  Gross per 24 hour   Intake 1000 ml   Output --   Net 1000 ml      BP 94/56   Pulse 91   Temp (!) 38.4 °C (101.1 °F) (Axillary)   Resp 13   Ht 1.778 m (5' 10\")   Wt 105 kg (231 lb 7.7 oz)   SpO2 96%  Temp (24hrs), Av.9 °C (102.1 °F), Min:38.4 °C (101.1 °F), Max:39.5 °C (103.1 °F)      List concerns for Vancomycin clearance:     Age;ARMANI    Pharmacokinetics:     AUC kinetics:   Ke (hr ^-1): 0.0492 hr^-1  Half life: 14.09 hr  Clearance: 2.826  Estimated TDD: 1413  Estimated Dose: 948  Estimated interval: 16.1    A/P:     -  Vancomycin dose: 1250 mg " q24h    -  Next vancomycin level(s):    - None ordered. Pending steady state ~2/25    -  Predicted vancomycin AUC from initial AUC test calculator: 442 mg·hr/L    -  Comments: Noted ARMANI, unclear baseline. Expect renal fx to improve with MIVF. Pharmacy to follow renal fx trends. MRSA nares ordered for de-escalation opportunity.    Daphnie Rashid, PharmD

## 2025-02-22 NOTE — H&P
Hospital Medicine History & Physical Note    Date of Service  2/22/2025    Primary Care Physician  Pcp Not In Computer    Consultants      Specialist Names:     Code Status  Full Code    Chief Complaint  Chief Complaint   Patient presents with    ALOC    Fever       History of Presenting Illness  Sebastian Sinha is a 74 y.o. male who presented 2/22/2025 with with history of BPH, hypertension, chronic anticoagulation on apixaban, rectal cancer status postresection chemo and radiation with now mets to the lung s/p radiation    Patient is a resident of assisted living.  He was sent to us with altered mentation.  Apparently he was behaving normally yesterday, but was found to be febrile and altered this morning.  He had been written a prescription for antibiotics for lower extremity leg wounds however had not had an opportunity to fill it.    Review of systems on my exam is limited as the patient remains altered.  He does complain of pain in his left ankle, he has no other complaints.    I discussed the plan of care with patient.    Review of Systems  Review of Systems   Unable to perform ROS: Mental status change       Past Medical History   has a past medical history of A-fib (HCC), Arthritis, BPH (benign prostatic hyperplasia), Cellulitis, Diabetes (HCC), Kidney disease, Rectal cancer (HCC), and Sleep apnea.    Surgical History   has a past surgical history that includes other abdominal surgery; other; low anterior resection laparoscopic (03/27/2015); colonoscopy; colonoscopy (01/22/2018); cataract extraction with iol (Bilateral, 2020); and toe amputation (Bilateral).     Family History  family history includes COPD in his father; Cancer in his mother.   Family history reviewed with patient. There is no family history that is pertinent to the chief complaint.     Social History   reports that he has quit smoking. His smoking use included cigarettes. He has never used smokeless tobacco. He reports that he does not drink  "alcohol and does not use drugs.    Allergies  Allergies   Allergen Reactions    Gabapentin Unspecified     \"Dizziness\"       Medications  Prior to Admission Medications   Prescriptions Last Dose Informant Patient Reported? Taking?   Cyanocobalamin (VITAMIN B-12) 1000 MCG Tab Unknown Patient's Home Pharmacy Yes No   Sig: Take 1,000 mcg by mouth every day.   acetaminophen (TYLENOL) 500 MG Tab Unknown Patient's Home Pharmacy Yes No   Sig: Take 1,000 mg by mouth every 8 hours as needed for Mild Pain. 2 tablets = 1,000 mg.   apixaban (ELIQUIS) 5mg Tab Unknown Patient's Home Pharmacy Yes No   Sig: Take 5 mg by mouth 2 times a day.   baclofen (LIORESAL) 5 MG Tab Unknown Patient's Home Pharmacy Yes No   Sig: Take 2.5 mg by mouth 3 times a day as needed (Muscle Spasms). 1/2 tablet = 2.5 mg.   metformin (GLUCOPHAGE) 1000 MG tablet Unknown Patient's Home Pharmacy Yes No   Sig: Take 1,000 mg by mouth 2 times a day with meals.   metoprolol SR (TOPROL XL) 25 MG TABLET SR 24 HR Unknown Patient's Home Pharmacy Yes No   Sig: Take 12.5 mg by mouth every day. 1/2 tablet = 12.5 mg.   pregabalin (LYRICA) 150 MG Cap Unknown Patient's Home Pharmacy Yes No   Sig: Take 150 mg by mouth 2 times a day.   rosuvastatin (CRESTOR) 10 MG Tab Unknown Patient's Home Pharmacy Yes No   Sig: Take 10 mg by mouth every day.   tamsulosin (FLOMAX) 0.4 MG capsule Unknown Patient's Home Pharmacy Yes No   Sig: Take 0.4 mg by mouth every day. Take 30 minutes after the same meal each day.   venlafaxine XR (EFFEXOR XR) 37.5 MG CAPSULE SR 24 HR Unknown Patient's Home Pharmacy Yes No   Sig: Take 37.5 mg by mouth every day.      Facility-Administered Medications: None       Physical Exam  Temp:  [38.4 °C (101.1 °F)-39.5 °C (103.1 °F)] 38.4 °C (101.1 °F)  Pulse:  [] 98  Resp:  [17-24] 24  BP: ()/(55-76) 92/55  SpO2:  [94 %-96 %] 94 %  Blood Pressure : 92/55   Temperature: (!) 38.4 °C (101.1 °F)   Pulse: 98   Respiration: (!) 24   Pulse Oximetry: 94 % " "      Physical Exam  Constitutional:       General: He is not in acute distress.     Appearance: He is well-developed. He is not diaphoretic.   HENT:      Head: Normocephalic and atraumatic.   Eyes:      Conjunctiva/sclera: Conjunctivae normal.   Neck:      Vascular: No JVD.   Cardiovascular:      Rate and Rhythm: Normal rate.      Heart sounds: No murmur heard.     No gallop.   Pulmonary:      Effort: Pulmonary effort is normal. No respiratory distress.      Breath sounds: No stridor. No wheezing or rales.   Abdominal:      Palpations: Abdomen is soft.      Tenderness: There is no abdominal tenderness. There is no guarding or rebound.   Musculoskeletal:      Right lower leg: Edema present.      Left lower leg: Edema present.   Skin:     General: Skin is warm and dry.      Findings: No rash.      Comments: Multiple wounds are noted in both lower extremities more so on the left than the right side.  Trace edema from the mid calf down bilaterally   Neurological:      Comments: Patient is alert and oriented to the hospital but is unable to give me month or day.  He is uncertain when he got here.  They symmetric speech clear    Moving all 4 purposefully.         Laboratory:  Recent Labs     02/22/25  1140   WBC 22.3*   RBC 4.58*   HEMOGLOBIN 12.8*   HEMATOCRIT 40.0*   MCV 87.3   MCH 27.9   MCHC 32.0*   RDW 48.0   PLATELETCT 239   MPV 10.7     Recent Labs     02/22/25  1140   SODIUM 132*   POTASSIUM 5.2   CHLORIDE 98   CO2 19*   GLUCOSE 175*   BUN 31*   CREATININE 1.80*   CALCIUM 9.0     Recent Labs     02/22/25  1140   ALTSGPT 34   ASTSGOT 26   ALKPHOSPHAT 108*   TBILIRUBIN 0.7   GLUCOSE 175*         No results for input(s): \"NTPROBNP\" in the last 72 hours.      No results for input(s): \"TROPONINT\" in the last 72 hours.    Imaging:  CT-HEAD W/O   Final Result      1. No acute intracranial abnormality.   2. Atrophy and diffuse chronic microangiopathic white matter changes versus demyelination or gliosis.             "   DX-CHEST-PORTABLE (1 VIEW)   Final Result      1.  Prominent cardiac silhouette may be due to patient's rotation or mild cardiomegaly.   2.  Right basilar atelectasis. Possible small right pleural effusion.   3.  Mild bilateral interstitial prominence may be due to patient's rotation or mild edema.              Assessment/Plan:  Justification for Admission Status  I anticipate this patient will require at least two midnights for appropriate medical management, necessitating inpatient admission because acute metabolic encephalopathy    Patient will need a Med/Surg bed on MEDICAL service .  The need is secondary to acute metabolic encephalopathy.    Sepsis ([facility])  Assessment & Plan  This is Sepsis Present on admission  SIRS criteria identified on my evaluation include: Fever, with temperature greater than 100.9 deg F, Leukocytosis, with WBC greater than 12,000, and Bandemia, greater than 10% bands  Clinical indicators of end organ dysfunction include Lactic Acid greater than 2 and Toxic Metabolic Encephalopathy  Source is skin versus urine  Sepsis protocol initiated  Crystalloid Fluid Administration: Fluid resuscitation ordered per standard protocol - 30 mL/kg per current or ideal body weight  IV antibiotics as appropriate for source of sepsis  Reassessment: I have reassessed the patient's hemodynamic status    ARMANI (acute kidney injury) ([facility])  Assessment & Plan  Likely sepsis induced  Patient has been fluid resuscitated  Monitor her urine output  Daily BMP  Renally dose medications as appropriate  Consider further workup if he does not normalize tomorrow.  History of BPH so low threshold to check postvoid or renal ultrasound    Acute metabolic encephalopathy  Assessment & Plan  Patient's mental status has improved since arrival  His exam is significant for obvious signs of cellulitis, his labs are significant for indications of UTI.  Either of these things could contribute to his his encephalopathy.  Given his  improving mental status with fluid resuscitation and antibiotics, I do not think he needs lumbar puncture or further workup.    Chronic anticoagulation  Assessment & Plan  On apixaban for history of paroxysmal atrial fibrillation    Primary hypertension  Assessment & Plan  Continue home medications with parameters    Acute cystitis without hematuria  Assessment & Plan  UA indicates infection  Place patient on ampicillin-sulbactam  Follow urine and blood cultures    Benign prostatic hyperplasia without lower urinary tract symptoms  Assessment & Plan  Continue tamsulosin  Monitor for signs of retention        VTE prophylaxis: therapeutic anticoagulation with apixaban

## 2025-02-22 NOTE — ASSESSMENT & PLAN NOTE
Sepsis resolved    2/25/2025  Continue on Rocephin, Zyvox  Continue on Eliquis  On insulin regimen, monitor fingerstick closely  Discontinue hydrocortisone,  Repeat BMP in a.m. to monitor electrolytes and renal function  Repeat CBC in a.m. to monitor white count and hemoglobin  Get wound culture, wound care evaluation  Need close monitoring of blood counts, electrolytes and renal function due to potential of organ dysfunction due to sepsis  Requiring IV antibiotics, need close monitoring for toxicity  High risk of deterioration into severe sepsis.  Need close hemodynamic monitoring  Patient has a high medical complexity, complex decision making and is at high risk of complication, morbidity and mortality

## 2025-02-22 NOTE — ED PROVIDER NOTES
"ED PHYSICIAN NOTE    CHIEF COMPLAINT  Chief Complaint   Patient presents with    ALOC    Fever       HPI/ROS  LIMITATION TO HISTORY   Select: Altered mental status / Confusion  OUTSIDE HISTORIAN(S):  EMS patient is brought in for fever and altered mental status.  Normally awake and alert.    Sebastian Sinha is a 74 y.o. male who presents with fever and altered mental status.  History is limited as patient cannot provide any history.  Patient has known leg wounds.  He was prescribed antibiotics but these have not been started as of yet by report.  He has not been vomiting.  No diarrhea.    PAST MEDICAL HISTORY  Past Medical History:   Diagnosis Date    A-fib (HCC)     Arthritis     BPH (benign prostatic hyperplasia)     Cellulitis     Diabetes (HCC)     pt does not take his metformin    Kidney disease     stage 3    Rectal cancer (HCC)     Sleep apnea     no CPAP       SOCIAL HISTORY  Social History     Tobacco Use    Smoking status: Former     Types: Cigarettes    Smokeless tobacco: Never    Tobacco comments:     \"Rare use 50 years ago, total 3 weeks\"   Substance Use Topics    Alcohol use: No    Drug use: No       CURRENT MEDICATIONS  Home Medications       Reviewed by Aury Milan (Pharmacy Tech) on 02/22/25 at 1339  Med List Status: Complete     Medication Last Dose Status   acetaminophen (TYLENOL) 500 MG Tab Unknown Active   apixaban (ELIQUIS) 5mg Tab Unknown Active   baclofen (LIORESAL) 5 MG Tab Unknown Active   cefUROXime (CEFTIN) 500 MG Tab Unknown Active   Cyanocobalamin (VITAMIN B-12) 1000 MCG Tab Unknown Active   diclofenac sodium (VOLTAREN) 1 % Gel Unknown Active   lidocaine (LIDODERM) 5 % Patch Unknown Active   metformin (GLUCOPHAGE) 1000 MG tablet Unknown Active   metoprolol SR (TOPROL XL) 25 MG TABLET SR 24 HR Unknown Active   Miconazole 2 % Powder Unknown Active   pregabalin (LYRICA) 150 MG Cap Unknown Active   rosuvastatin (CRESTOR) 10 MG Tab Unknown Active   SITagliptin (ZITUVIO) 100 MG Tab " "Unknown Active   tamsulosin (FLOMAX) 0.4 MG capsule Unknown Active   triamcinolone acetonide (KENALOG) 0.1 % Cream Unknown Active   venlafaxine XR (EFFEXOR XR) 37.5 MG CAPSULE SR 24 HR Unknown Active                  Audit from Redirected Encounters    **Home medications have not yet been reviewed for this encounter**         ALLERGIES  Allergies   Allergen Reactions    Gabapentin Unspecified     \"Dizziness\"       PHYSICAL EXAM  VITAL SIGNS: BP 92/55   Pulse 98   Temp (!) 38.4 °C (101.1 °F) (Axillary)   Resp (!) 24   Ht 1.778 m (5' 10\")   Wt 105 kg (231 lb 7.7 oz)   SpO2 94%   BMI 33.21 kg/m²    Constitutional: Awake and alert chronically ill-appearing elderly male.  Does not answer questions  HENT: Normal inspection  Eyes: Normal inspection  Neck: Grossly normal range of motion.  Cardiovascular: Mildly elevated heart rate, Normal rhythm.  Symmetric peripheral pulses.   Thorax & Lungs: No respiratory distress, No wheezing, No rales, No rhonchi, No chest tenderness.   Abdomen: Bowel sounds normal, soft, non-distended, nontender, no mass  Skin: Leg wounds bilaterally worse on the left than the right with mild surrounding redness.  There is a clear discharge.      DIAGNOSTIC STUDIES / PROCEDURES  LABS/EKG  Results for orders placed or performed during the hospital encounter of 02/22/25   Lactic Acid    Collection Time: 02/22/25 11:40 AM   Result Value Ref Range    Lactic Acid 2.3 (H) 0.5 - 2.0 mmol/L   CBC with Differential    Collection Time: 02/22/25 11:40 AM   Result Value Ref Range    WBC 22.3 (H) 4.8 - 10.8 K/uL    RBC 4.58 (L) 4.70 - 6.10 M/uL    Hemoglobin 12.8 (L) 14.0 - 18.0 g/dL    Hematocrit 40.0 (L) 42.0 - 52.0 %    MCV 87.3 81.4 - 97.8 fL    MCH 27.9 27.0 - 33.0 pg    MCHC 32.0 (L) 32.3 - 36.5 g/dL    RDW 48.0 35.9 - 50.0 fL    Platelet Count 239 164 - 446 K/uL    MPV 10.7 9.0 - 12.9 fL    Neutrophils-Polys 85.90 (H) 44.00 - 72.00 %    Lymphocytes 7.30 (L) 22.00 - 41.00 %    Monocytes 5.50 0.00 - " 13.40 %    Eosinophils 0.00 0.00 - 6.90 %    Basophils 0.30 0.00 - 1.80 %    Immature Granulocytes 1.00 (H) 0.00 - 0.90 %    Nucleated RBC 0.00 0.00 - 0.20 /100 WBC    Neutrophils (Absolute) 19.16 (H) 1.82 - 7.42 K/uL    Lymphs (Absolute) 1.62 1.00 - 4.80 K/uL    Monos (Absolute) 1.23 (H) 0.00 - 0.85 K/uL    Eos (Absolute) 0.00 0.00 - 0.51 K/uL    Baso (Absolute) 0.06 0.00 - 0.12 K/uL    Immature Granulocytes (abs) 0.23 (H) 0.00 - 0.11 K/uL    NRBC (Absolute) 0.00 K/uL   Complete Metabolic Panel    Collection Time: 02/22/25 11:40 AM   Result Value Ref Range    Sodium 132 (L) 135 - 145 mmol/L    Potassium 5.2 3.6 - 5.5 mmol/L    Chloride 98 96 - 112 mmol/L    Co2 19 (L) 20 - 33 mmol/L    Anion Gap 15.0 7.0 - 16.0    Glucose 175 (H) 65 - 99 mg/dL    Bun 31 (H) 8 - 22 mg/dL    Creatinine 1.80 (H) 0.50 - 1.40 mg/dL    Calcium 9.0 8.5 - 10.5 mg/dL    Correct Calcium 9.9 8.5 - 10.5 mg/dL    AST(SGOT) 26 12 - 45 U/L    ALT(SGPT) 34 2 - 50 U/L    Alkaline Phosphatase 108 (H) 30 - 99 U/L    Total Bilirubin 0.7 0.1 - 1.5 mg/dL    Albumin 2.9 (L) 3.2 - 4.9 g/dL    Total Protein 7.5 6.0 - 8.2 g/dL    Globulin 4.6 (H) 1.9 - 3.5 g/dL    A-G Ratio 0.6 g/dL   Blood Culture - Draw one from central line and one from peripheral site    Collection Time: 02/22/25 11:40 AM    Specimen: Line; Blood   Result Value Ref Range    Significant Indicator NEG     Source BLD     Site Peripheral     Culture Result       No Growth  Note: Blood cultures are incubated for 5 days and  are monitored continuously.Positive blood cultures  are called to the RN and reported as soon as  they are identified.     ESTIMATED GFR    Collection Time: 02/22/25 11:40 AM   Result Value Ref Range    GFR (CKD-EPI) 39 (A) >60 mL/min/1.73 m 2   EKG    Collection Time: 02/22/25 11:48 AM   Result Value Ref Range    Report       [facility] Emergency Dept.    Test Date:  2025-02-22  Pt Name:    CYRUS SAAB                 Department: ER  MRN:         [id number]                      Room:       [facility]  Gender:     Male                         Technician: [id number]  :        1950                   Requested By:CLAUDIO SYKES  Order #:    [id number]                    Reading MD:    Measurements  Intervals                                Axis  Rate:       95                           P:          -45  TN:         159                          QRS:        -48  QRSD:       107                          T:          98  QT:         324  QTc:        408    Interpretive Statements  Sinus or ectopic atrial tachycardia  Atrial premature complexes  Left anterior fascicular block  Abnormal R-wave progression, early transition  Borderline repolarization abnormality  ST elevation, consider inferior injury  Compared to ECG 2024 16:59:36  Atrial premature complex(es) now present  ST (T wave) deviation now  present  Myocardial infarct finding now present  Atrial fibrillation no longer present     Blood Culture - Draw one from central line and one from peripheral site    Collection Time: 25 11:50 AM    Specimen: Peripheral; Blood   Result Value Ref Range    Significant Indicator NEG     Source BLD     Site PERIPHERAL     Culture Result       No Growth  Note: Blood cultures are incubated for 5 days and  are monitored continuously.Positive blood cultures  are called to the RN and reported as soon as  they are identified.     Urinalysis    Collection Time: 25 12:10 PM    Specimen: Urine   Result Value Ref Range    Color Orange (A)     Character Turbid (A)     Specific Gravity 1.016 <1.035    Ph 5.5 5.0 - 8.0    Glucose Negative Negative mg/dL    Ketones 15 (A) Negative mg/dL    Protein 100 (A) Negative mg/dL    Bilirubin Negative Negative    Urobilinogen, Urine 1.0 <=1.0 EU/dL    Nitrite Positive (A) Negative    Leukocyte Esterase Large (A) Negative    Occult Blood Large (A) Negative    Micro Urine Req Microscopic    URINE MICROSCOPIC (W/UA)    Collection Time:  02/22/25 12:10 PM   Result Value Ref Range    WBC >100 (A) /hpf    RBC >100 (A) 0 - 2 /hpf    Bacteria Many (A) None /hpf    Epithelial Cells 3-5 0 - 5 /hpf    Urine Casts 0-2 0 - 2 /lpf   Lactic Acid    Collection Time: 02/22/25  1:40 PM   Result Value Ref Range    Lactic Acid 2.0 0.5 - 2.0 mmol/L          RADIOLOGY  CT-HEAD W/O   Final Result      1. No acute intracranial abnormality.   2. Atrophy and diffuse chronic microangiopathic white matter changes versus demyelination or gliosis.               DX-CHEST-PORTABLE (1 VIEW)   Final Result      1.  Prominent cardiac silhouette may be due to patient's rotation or mild cardiomegaly.   2.  Right basilar atelectasis. Possible small right pleural effusion.   3.  Mild bilateral interstitial prominence may be due to patient's rotation or mild edema.            COURSE & MEDICAL DECISION MAKING    INITIAL ASSESSMENT, COURSE AND PLAN  Case narrative: Patient presents with fever and altered mental status.  He has wounds on both of his lower extremities which could be a source.  Sepsis protocol was initiated.  Laboratory data.  Will obtain UA.  Will obtain CT scan of the head because of his confusion.  He has a benign abdominal exam.  Ordered 1 L IV fluid bolus.  Will not give 30 cc/kg bolus for fear of volume overload.  Empiric treatment with Ancef.  Give Tylenol for fever.    Laboratory data returned significant leukocytosis and lactic acidosis.  He has acute kidney injury.  Found to have UTI.  CT and x-ray unremarkable.    Patient is critically ill will need to be admitted to hospital.  Discussed case with Dr. Ochoa.        DISPOSITION AND DISCUSSIONS  I have discussed management of the patient with the following physicians and JONATHAN's:  as noted above      FINAL IMPRESSION  1.  Sepsis  2.  Urinary tract infection  3.  Bilateral lower extremity wounds      CRITICAL CARE  The very real possibilty of a deterioration of this patient's condition required the highest level  of my preparedness for sudden, emergent intervention.  I provided critical care services, which included medication orders, frequent reevaluations of the patient's condition and response to treatment, ordering and reviewing test results, and discussing the case with various consultants.  The critical care time associated with the care of the patient was 35 minutes. Review chart for interventions. This time is exclusive of any other billable procedures.     This dictation was created using voice recognition software. The accuracy of the dictation is limited to the abilities of the software. I expect there may be some errors of grammar and possibly content. The nursing notes were reviewed and certain aspects of this information were incorporated into this note.    Electronically signed by: Mert Whiting M.D., 2/22/2025

## 2025-02-23 PROBLEM — L03.90 CELLULITIS: Status: ACTIVE | Noted: 2025-02-23

## 2025-02-23 PROBLEM — R78.81 BACTEREMIA, ESCHERICHIA COLI: Status: ACTIVE | Noted: 2025-02-23

## 2025-02-23 PROBLEM — B96.20 BACTEREMIA, ESCHERICHIA COLI: Status: ACTIVE | Noted: 2025-02-23

## 2025-02-23 PROBLEM — Z71.89 ACP (ADVANCE CARE PLANNING): Status: ACTIVE | Noted: 2025-02-23

## 2025-02-23 LAB
ALBUMIN SERPL BCP-MCNC: 2.5 G/DL (ref 3.2–4.9)
ALBUMIN/GLOB SERPL: 0.6 G/DL
ALP SERPL-CCNC: 100 U/L (ref 30–99)
ALT SERPL-CCNC: 26 U/L (ref 2–50)
ANION GAP SERPL CALC-SCNC: 11 MMOL/L (ref 7–16)
AST SERPL-CCNC: 30 U/L (ref 12–45)
BASE EXCESS BLDV CALC-SCNC: -4 MMOL/L (ref -2–3)
BASOPHILS # BLD AUTO: 0 % (ref 0–1.8)
BASOPHILS # BLD: 0 K/UL (ref 0–0.12)
BILIRUB SERPL-MCNC: 0.4 MG/DL (ref 0.1–1.5)
BODY TEMPERATURE: 36.1 CENTIGRADE
BUN SERPL-MCNC: 29 MG/DL (ref 8–22)
BURR CELLS BLD QL SMEAR: NORMAL
CALCIUM ALBUM COR SERPL-MCNC: 9.8 MG/DL (ref 8.5–10.5)
CALCIUM SERPL-MCNC: 8.6 MG/DL (ref 8.5–10.5)
CHLORIDE SERPL-SCNC: 105 MMOL/L (ref 96–112)
CO2 SERPL-SCNC: 19 MMOL/L (ref 20–33)
CORTIS SERPL-MCNC: 66 UG/DL (ref 0–23)
CREAT SERPL-MCNC: 1.49 MG/DL (ref 0.5–1.4)
EOSINOPHIL # BLD AUTO: 0 K/UL (ref 0–0.51)
EOSINOPHIL NFR BLD: 0 % (ref 0–6.9)
ERYTHROCYTE [DISTWIDTH] IN BLOOD BY AUTOMATED COUNT: 47.2 FL (ref 35.9–50)
GFR SERPLBLD CREATININE-BSD FMLA CKD-EPI: 49 ML/MIN/1.73 M 2
GLOBULIN SER CALC-MCNC: 4.1 G/DL (ref 1.9–3.5)
GLUCOSE BLD STRIP.AUTO-MCNC: 176 MG/DL (ref 65–99)
GLUCOSE BLD STRIP.AUTO-MCNC: 176 MG/DL (ref 65–99)
GLUCOSE BLD STRIP.AUTO-MCNC: 187 MG/DL (ref 65–99)
GLUCOSE BLD STRIP.AUTO-MCNC: 187 MG/DL (ref 65–99)
GLUCOSE BLD STRIP.AUTO-MCNC: 192 MG/DL (ref 65–99)
GLUCOSE SERPL-MCNC: 197 MG/DL (ref 65–99)
GRAM STN SPEC: NORMAL
HCO3 BLDV-SCNC: 20 MMOL/L (ref 22–29)
HCT VFR BLD AUTO: 35.3 % (ref 42–52)
HGB BLD-MCNC: 11 G/DL (ref 14–18)
INHALED O2 FLOW RATE: 2 L/MIN (ref 2–10)
LACTATE SERPL-SCNC: 1.1 MMOL/L (ref 0.5–2)
LYMPHOCYTES # BLD AUTO: 0.73 K/UL (ref 1–4.8)
LYMPHOCYTES NFR BLD: 3.5 % (ref 22–41)
MAGNESIUM SERPL-MCNC: 2.2 MG/DL (ref 1.5–2.5)
MANUAL DIFF BLD: NORMAL
MCH RBC QN AUTO: 27.4 PG (ref 27–33)
MCHC RBC AUTO-ENTMCNC: 31.2 G/DL (ref 32.3–36.5)
MCV RBC AUTO: 87.8 FL (ref 81.4–97.8)
MONOCYTES # BLD AUTO: 0.73 K/UL (ref 0–0.85)
MONOCYTES NFR BLD AUTO: 3.5 % (ref 0–13.4)
MORPHOLOGY BLD-IMP: NORMAL
NEUTROPHILS # BLD AUTO: 19.34 K/UL (ref 1.82–7.42)
NEUTROPHILS NFR BLD: 93 % (ref 44–72)
NRBC # BLD AUTO: 0 K/UL
NRBC BLD-RTO: 0 /100 WBC (ref 0–0.2)
PCO2 BLDV: 33.2 MMHG (ref 38–54)
PCO2 TEMP ADJ BLDV: 31.9 MMHG (ref 38–54)
PH BLDV: 7.4 [PH] (ref 7.31–7.45)
PH TEMP ADJ BLDV: 7.41 [PH] (ref 7.31–7.45)
PHOSPHATE SERPL-MCNC: 3.1 MG/DL (ref 2.5–4.5)
PHOSPHATE SERPL-MCNC: 3.3 MG/DL (ref 2.5–4.5)
PLATELET # BLD AUTO: 190 K/UL (ref 164–446)
PLATELET BLD QL SMEAR: NORMAL
PMV BLD AUTO: 10.7 FL (ref 9–12.9)
PO2 BLDV: 76.7 MMHG (ref 23–48)
PO2 TEMP ADJ BLDV: 72.2 MMHG (ref 23–48)
POIKILOCYTOSIS BLD QL SMEAR: NORMAL
POTASSIUM SERPL-SCNC: 4.7 MMOL/L (ref 3.6–5.5)
PROT SERPL-MCNC: 6.6 G/DL (ref 6–8.2)
RBC # BLD AUTO: 4.02 M/UL (ref 4.7–6.1)
RBC BLD AUTO: PRESENT
SAO2 % BLDV: 94.1 % (ref 60–85)
SIGNIFICANT IND 70042: NORMAL
SITE SITE: NORMAL
SODIUM SERPL-SCNC: 135 MMOL/L (ref 135–145)
SOURCE SOURCE: NORMAL
WBC # BLD AUTO: 20.8 K/UL (ref 4.8–10.8)

## 2025-02-23 PROCEDURE — 83735 ASSAY OF MAGNESIUM: CPT

## 2025-02-23 PROCEDURE — 84100 ASSAY OF PHOSPHORUS: CPT

## 2025-02-23 PROCEDURE — 770020 HCHG ROOM/CARE - TELE (206)

## 2025-02-23 PROCEDURE — 87070 CULTURE OTHR SPECIMN AEROBIC: CPT

## 2025-02-23 PROCEDURE — 700102 HCHG RX REV CODE 250 W/ 637 OVERRIDE(OP): Performed by: HOSPITALIST

## 2025-02-23 PROCEDURE — 700102 HCHG RX REV CODE 250 W/ 637 OVERRIDE(OP): Performed by: STUDENT IN AN ORGANIZED HEALTH CARE EDUCATION/TRAINING PROGRAM

## 2025-02-23 PROCEDURE — 82803 BLOOD GASES ANY COMBINATION: CPT

## 2025-02-23 PROCEDURE — 82533 TOTAL CORTISOL: CPT

## 2025-02-23 PROCEDURE — A9270 NON-COVERED ITEM OR SERVICE: HCPCS | Performed by: STUDENT IN AN ORGANIZED HEALTH CARE EDUCATION/TRAINING PROGRAM

## 2025-02-23 PROCEDURE — 82962 GLUCOSE BLOOD TEST: CPT | Mod: 91

## 2025-02-23 PROCEDURE — 700111 HCHG RX REV CODE 636 W/ 250 OVERRIDE (IP): Mod: JZ | Performed by: HOSPITALIST

## 2025-02-23 PROCEDURE — 99497 ADVNCD CARE PLAN 30 MIN: CPT | Performed by: STUDENT IN AN ORGANIZED HEALTH CARE EDUCATION/TRAINING PROGRAM

## 2025-02-23 PROCEDURE — 36415 COLL VENOUS BLD VENIPUNCTURE: CPT

## 2025-02-23 PROCEDURE — 700105 HCHG RX REV CODE 258: Performed by: HOSPITALIST

## 2025-02-23 PROCEDURE — 80053 COMPREHEN METABOLIC PANEL: CPT

## 2025-02-23 PROCEDURE — 700111 HCHG RX REV CODE 636 W/ 250 OVERRIDE (IP): Mod: JZ | Performed by: STUDENT IN AN ORGANIZED HEALTH CARE EDUCATION/TRAINING PROGRAM

## 2025-02-23 PROCEDURE — 700111 HCHG RX REV CODE 636 W/ 250 OVERRIDE (IP): Performed by: NURSE PRACTITIONER

## 2025-02-23 PROCEDURE — A9270 NON-COVERED ITEM OR SERVICE: HCPCS | Performed by: HOSPITALIST

## 2025-02-23 PROCEDURE — 92610 EVALUATE SWALLOWING FUNCTION: CPT

## 2025-02-23 PROCEDURE — 85027 COMPLETE CBC AUTOMATED: CPT

## 2025-02-23 PROCEDURE — 302106 OSTOMY POWDER: Performed by: STUDENT IN AN ORGANIZED HEALTH CARE EDUCATION/TRAINING PROGRAM

## 2025-02-23 PROCEDURE — 87205 SMEAR GRAM STAIN: CPT

## 2025-02-23 PROCEDURE — 700105 HCHG RX REV CODE 258: Performed by: STUDENT IN AN ORGANIZED HEALTH CARE EDUCATION/TRAINING PROGRAM

## 2025-02-23 PROCEDURE — 700105 HCHG RX REV CODE 258: Performed by: NURSE PRACTITIONER

## 2025-02-23 PROCEDURE — 85007 BL SMEAR W/DIFF WBC COUNT: CPT

## 2025-02-23 PROCEDURE — 99233 SBSQ HOSP IP/OBS HIGH 50: CPT | Mod: 25 | Performed by: STUDENT IN AN ORGANIZED HEALTH CARE EDUCATION/TRAINING PROGRAM

## 2025-02-23 PROCEDURE — 83605 ASSAY OF LACTIC ACID: CPT

## 2025-02-23 PROCEDURE — 97602 WOUND(S) CARE NON-SELECTIVE: CPT

## 2025-02-23 RX ORDER — SODIUM CHLORIDE, SODIUM LACTATE, POTASSIUM CHLORIDE, CALCIUM CHLORIDE 600; 310; 30; 20 MG/100ML; MG/100ML; MG/100ML; MG/100ML
INJECTION, SOLUTION INTRAVENOUS CONTINUOUS
Status: DISCONTINUED | OUTPATIENT
Start: 2025-02-23 | End: 2025-02-23

## 2025-02-23 RX ORDER — LINEZOLID 600 MG/1
600 TABLET, FILM COATED ORAL EVERY 12 HOURS
Status: DISCONTINUED | OUTPATIENT
Start: 2025-02-23 | End: 2025-02-26 | Stop reason: HOSPADM

## 2025-02-23 RX ORDER — SODIUM CHLORIDE 9 MG/ML
INJECTION, SOLUTION INTRAVENOUS CONTINUOUS
Status: DISCONTINUED | OUTPATIENT
Start: 2025-02-23 | End: 2025-02-23

## 2025-02-23 RX ORDER — HYDROCORTISONE SODIUM SUCCINATE 100 MG/2ML
50 INJECTION INTRAMUSCULAR; INTRAVENOUS EVERY 8 HOURS
Status: DISCONTINUED | OUTPATIENT
Start: 2025-02-23 | End: 2025-02-24

## 2025-02-23 RX ADMIN — THIAMINE HYDROCHLORIDE 500 MG: 100 INJECTION, SOLUTION INTRAMUSCULAR; INTRAVENOUS at 00:30

## 2025-02-23 RX ADMIN — AMPICILLIN AND SULBACTAM 3 G: 1; 2 INJECTION, POWDER, FOR SOLUTION INTRAMUSCULAR; INTRAVENOUS at 02:23

## 2025-02-23 RX ADMIN — HYDROCORTISONE SODIUM SUCCINATE 50 MG: 100 INJECTION, POWDER, FOR SOLUTION INTRAMUSCULAR; INTRAVENOUS at 15:55

## 2025-02-23 RX ADMIN — MIDODRINE HYDROCHLORIDE 10 MG: 5 TABLET ORAL at 08:07

## 2025-02-23 RX ADMIN — INSULIN LISPRO 2 UNITS: 100 INJECTION, SOLUTION INTRAVENOUS; SUBCUTANEOUS at 18:13

## 2025-02-23 RX ADMIN — HYDROCORTISONE SODIUM SUCCINATE 50 MG: 100 INJECTION, POWDER, FOR SOLUTION INTRAMUSCULAR; INTRAVENOUS at 20:54

## 2025-02-23 RX ADMIN — CEFTRIAXONE SODIUM 2000 MG: 10 INJECTION, POWDER, FOR SOLUTION INTRAVENOUS at 05:37

## 2025-02-23 RX ADMIN — APIXABAN 5 MG: 5 TABLET, FILM COATED ORAL at 18:12

## 2025-02-23 RX ADMIN — HYDROCORTISONE SODIUM SUCCINATE 100 MG: 100 INJECTION, POWDER, FOR SOLUTION INTRAMUSCULAR; INTRAVENOUS at 05:37

## 2025-02-23 RX ADMIN — ACETAMINOPHEN 1000 MG: 500 TABLET ORAL at 12:26

## 2025-02-23 RX ADMIN — SODIUM CHLORIDE: 9 INJECTION, SOLUTION INTRAVENOUS at 06:15

## 2025-02-23 RX ADMIN — SODIUM CHLORIDE, POTASSIUM CHLORIDE, SODIUM LACTATE AND CALCIUM CHLORIDE: 600; 310; 30; 20 INJECTION, SOLUTION INTRAVENOUS at 00:34

## 2025-02-23 RX ADMIN — INSULIN LISPRO 2 UNITS: 100 INJECTION, SOLUTION INTRAVENOUS; SUBCUTANEOUS at 12:16

## 2025-02-23 RX ADMIN — MIDODRINE HYDROCHLORIDE 10 MG: 5 TABLET ORAL at 12:26

## 2025-02-23 RX ADMIN — THIAMINE HYDROCHLORIDE 500 MG: 100 INJECTION, SOLUTION INTRAMUSCULAR; INTRAVENOUS at 08:07

## 2025-02-23 RX ADMIN — LINEZOLID 600 MG: 600 TABLET, FILM COATED ORAL at 18:12

## 2025-02-23 RX ADMIN — INSULIN LISPRO 2 UNITS: 100 INJECTION, SOLUTION INTRAVENOUS; SUBCUTANEOUS at 06:09

## 2025-02-23 RX ADMIN — INSULIN LISPRO 2 UNITS: 100 INJECTION, SOLUTION INTRAVENOUS; SUBCUTANEOUS at 21:00

## 2025-02-23 RX ADMIN — ACETAMINOPHEN 1000 MG: 500 TABLET ORAL at 23:45

## 2025-02-23 RX ADMIN — ACETAMINOPHEN 1000 MG: 500 TABLET ORAL at 18:12

## 2025-02-23 RX ADMIN — MIDODRINE HYDROCHLORIDE 10 MG: 5 TABLET ORAL at 18:12

## 2025-02-23 ASSESSMENT — ENCOUNTER SYMPTOMS
NAUSEA: 0
VOMITING: 0

## 2025-02-23 ASSESSMENT — PAIN DESCRIPTION - PAIN TYPE
TYPE: ACUTE PAIN
TYPE: ACUTE PAIN

## 2025-02-23 ASSESSMENT — FIBROSIS 4 INDEX: FIB4 SCORE: 2.23

## 2025-02-23 NOTE — ASSESSMENT & PLAN NOTE
I had a prolonged discussion with patient regarding goal of care, diagnosis prognosis, CODE STATUS.  We discussed about his active medical problem which include sepsis, bacteremia and chronic medical problem which include rectal cancer, lung cancer.  Discussed full resuscitation include chest compression, defibrillation and intubation.  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  He would not like to go through all the aggressive measure and requested for DNR/DNI.  CODE STATUS updated to DNR/DNI  I spent 19 minutes on advance care planning

## 2025-02-23 NOTE — CODE DOCUMENTATION
Patient not responsive and hypotensive, previously drowsy but oriented to hospital - after fluid bolus initiated patient back to baseline orientation

## 2025-02-23 NOTE — CARE PLAN
The patient is Watcher - Medium risk of patient condition declining or worsening    Shift Goals  Clinical Goals: monitor VS  Patient Goals: rets    Progress made toward(s) clinical / shift goals:    Problem: Pain - Standard  Goal: Alleviation of pain or a reduction in pain to the patient’s comfort goal  Outcome: Progressing     Problem: Hemodynamics  Goal: Patient's hemodynamics, fluid balance and neurologic status will be stable or improve  Outcome: Progressing     Problem: Respiratory  Goal: Patient will achieve/maintain optimum respiratory ventilation and gas exchange  Outcome: Progressing       Patient is not progressing towards the following goals:

## 2025-02-23 NOTE — WOUND TEAM
"  [facility]     Inpatient Services     Established Ostomy Management/ troubleshooting      Plan: Bedside RNs to assist pt with appliance changes. Ostomy RN to remain available PRN for any needs.    HPI: Reviewed  PMH: Reviewed   SH: Reviewed     Reason for Ostomy nurse consult:  Established colostomy    Ostomy History:    Patient states had the ostomy created a few years ago but cannot remember where or with which surgeon.      Upon assessment, rectum/anus has been surgically removed.      Colostomy 02/23/23 End/Alvarez's Pouch LUQ (Active)   Wound Image    02/23/25 1200   Stomal Appliance Assessment Intact;Changed    Stoma Assessment Pink;Red    Stoma Shape Round;Budded Less Than One Inch    Stoma Size (in) 1.25    Peristomal Assessment Intact    Mucocutaneous Junction Intact    Treatment Appliance Changed;Cleansed with water/washcloth    Peristomal Protectant Paste Ring    Stomal Appliance Paste Ring, 2\";2 3/4\" (70mm) CTF    Output (mL) 100 mL    Output Color Brown    WOUND RN ONLY - Stomal Appliance  2 Piece;Paste Ring, 2\";2 3/4\" (70mm) CTF    Appliance Brand Taylor    WOUND NURSE ONLY - Time Spent with Patient (mins) 60       Interventions and Education (if needed): removed previous appliance, cleansed skin with warm wash cloth.  Cut 2 3/4 barrier, applied paste ring to barrier and applied to skin.  Attached pouch and closed end.       Evaluation: viable producing stoma.  Nursing to continue changing appliance every 3-5 days and PRN.           Anticipated discharge needs: patient lives at assisted living facility.    "

## 2025-02-23 NOTE — PROGRESS NOTES
Bedside shift report received from day shift RN. Pt care assumed. Unable to assess orientation at this time. Pt lethargic. [facility] nurse and MD notified. RR called due to hypotension and lethargic. Bed in low and locked position. Call light and belongings within reach. MD and RR team present at bedside.

## 2025-02-23 NOTE — WOUND TEAM
"[facility]  Inpatient Services  Initial Wound and Skin Care Evaluation    Admission Date: 2/22/2025     Last order of IP CONSULT TO WOUND CARE was found on 2/23/2025 from Hospital Encounter on 2/22/2025     HPI, PMH, SH: Reviewed    Past Surgical History:   Procedure Laterality Date    CATARACT EXTRACTION WITH IOL Bilateral 2020    COLONOSCOPY  01/22/2018    biopsy colonic mucosa anastamosis @ 10cm  positive for moderately diff adenocarcinoma    LOW ANTERIOR RESECTION LAPAROSCOPIC  03/27/2015    (followed by wound infection 4/14/15)    COLONOSCOPY      Aug 2017    OTHER      oral surgery as a child    OTHER ABDOMINAL SURGERY      appendectomy    TOE AMPUTATION Bilateral     R 2nd digit, L 2nd digit     Social History     Tobacco Use    Smoking status: Former     Types: Cigarettes    Smokeless tobacco: Never    Tobacco comments:     \"Rare use 50 years ago, total 3 weeks\"   Substance Use Topics    Alcohol use: No     Chief Complaint   Patient presents with    ALOC    Fever     Diagnosis: Acute metabolic encephalopathy [G93.41]    Unit where seen by Wound Team: T738/01     WOUND CONSULT RELATED TO:  BLE    WOUND TEAM PLAN OF CARE - Frequency of Follow-up:   Nursing to follow dressing orders written for wound care. Contact wound team if area fails to progress, deteriorates or with any questions/concerns if something comes up before next scheduled follow up (See below as to whether wound is following and frequency of wound follow up)   Not following, consult as needed  - any area    WOUND HISTORY:   Pt is a 74yr old male admitted with altered mental status. Pt has history of HTN, A. Fib (on eliquis), Rectal CA with mets to lungs (s/p colostomy). Pt lives at an assisted living facility. Wound team was consulted regarding BLE and est. Ostomy.        WOUND ASSESSMENT/LDA         Wound 02/22/25 Venous Ulcer Leg Anterior;Lateral Left (Active)   Wound Image     02/23/25 1200   Site Assessment Pink;Red  "   Periwound Assessment Hemosiderin Staining    Margins Attached edges    Closure None    Drainage Amount Scant    Drainage Description Serosanguineous;Sanguineous    Treatments Cleansed;Site care;Offloading    Wound Cleansing Foam Cleanser/Washcloth    Periwound Protectant No-sting Skin Prep    Dressing Status Clean;Dry;Intact    Dressing Changed Changed    Dressing Cleansing/Solutions Not Applicable    Dressing Options Petroleum Gauze (clear);Offloading Dressing - Sacral    Dressing Change/Treatment Frequency Daily, and As Needed    NEXT Dressing Change/Treatment Date 02/24/25    NEXT Weekly Photo (Inpatient Only) 03/02/25    Wound Team Following Not following    Non-staged Wound Description Partial thickness        Wound 02/22/25 Pressure Injury Sacrum;Coccyx sDTI POA (Active)   Wound Image   02/23/25 1200   Site Assessment Pink;Red;Purple    Periwound Assessment Intact    Margins Attached edges    Closure None    Drainage Amount None    Treatments Cleansed;Site care;Offloading    Wound Cleansing Foam Cleanser/Washcloth    Dressing Status Clean;Dry;Intact    Dressing Changed Reapplied    Dressing Cleansing/Solutions Not Applicable    Dressing Options Offloading Dressing - Sacral    Dressing Change/Treatment Frequency Every 72 hrs, and As Needed    NEXT Dressing Change/Treatment Date 02/26/25    NEXT Weekly Photo (Inpatient Only) 03/02/25    Wound Team Following Not following    WOUND NURSE ONLY - Pressure Injury Stage DTPI        Wound 02/22/25 Pressure Injury Left sDTI POA (Active)   Wound Image   02/23/25 1200   Site Assessment Pink;Red;Purple    Periwound Assessment Intact    Margins Attached edges    Closure None    Drainage Amount None    Treatments Cleansed;Site care;Offloading    Dressing Status Clean;Dry;Intact    Dressing Changed Changed    Dressing Cleansing/Solutions Not Applicable    Dressing Options Offloading Dressing - Heel    Dressing Change/Treatment Frequency Every 72 hrs, and As Needed    NEXT  Dressing Change/Treatment Date 02/26/25    NEXT Weekly Photo (Inpatient Only) 03/02/25    Wound Team Following Not following    WOUND NURSE ONLY - Pressure Injury Stage DTPI                         Vascular:    TERRIE:   No results found.    Lab Values:    Lab Results   Component Value Date/Time    WBC 20.8 (H) 02/23/2025 12:39 AM    RBC 4.02 (L) 02/23/2025 12:39 AM    HEMOGLOBIN 11.0 (L) 02/23/2025 12:39 AM    HEMATOCRIT 35.3 (L) 02/23/2025 12:39 AM    CREACTPROT 20.60 (H) 02/22/2025 07:07 PM    SEDRATEWES 65 (H) 02/22/2025 05:26 PM         Culture Results show:  No results found for this or any previous visit (from the past 720 hours).    Pain Level/Medicated:  None, Tolerated without pain medication       INTERVENTIONS BY WOUND TEAM:  Chart and images reviewed. Discussed with bedside RN. All areas of concern (based on picture review, LDA review and discussion with bedside RN) have been thoroughly assessed. Documentation of areas based on significant findings. This RN in to assess patient. Performed standard wound care which includes appropriate positioning, dressing removal and non-selective debridement. Pictures and measurements obtained weekly if/when required.    Wound:  Sacrococcygeal area POA sDTI  Preparation for Dressing removal: Removed without difficulty  Cleansed/Non-selectively Debrided with:  Moist warm washcloth  Deana wound: Cleansed with Moist warm washcloth, Prepped with N/A  Primary Dressing:  Sacral offloading dressing  Secondary (Outer) Dressing: wedges to the left side     Wound:  Left Heel sDTI POA  Preparation for Dressing removal: Removed without difficulty  Cleansed/Non-selectively Debrided with:  N/A  Deana wound: Cleansed with N/A, Prepped with N/A  Primary Dressing:  Heel offloading dressings  Secondary (Outer) Dressing: pillow to further offload pressure     Wound:  BLE Venous ulcers  Preparation for Dressing removal: Removed without difficulty  Cleansed/Non-selectively Debrided with:   Moist warm washcloth  Deana wound: Cleansed with Moist warm washcloth, Prepped with No Sting  Primary Dressing:  Petrolatum (Xeroform ordered)  Secondary (Outer) Dressing: Secured with sacral offloading dressing    Area Assessed:  Posterior Head  Area manually palpated, no wounds appreciated, no pain noted     Area Assessed:  Bilateral Ears  Area intact, gray foam in use     Area Assessed: Abdomen/Pannus  Area intact, Colostomy in place - see Mervat ALVAREZ Wound RN note    Area Assessed: Back  Area intact,     Area Assessed:  Bilateral I.T.s  Area intact,     Area Assessed:  Bilateral ankles  Area intact,     Area Assessed:  Right heel  Area intact, Offloading dressing changed and pillows applied      Advanced Wound Care Discharge Planning  Number of Clinicians necessary to complete wound care: 1  Is patient requiring IV pain medications for dressing changes:  No   Length of time for dressing change 30 min. (This does not include chart review, pre-medication time, set up, clean up or time spent charting.)    Interdisciplinary consultation: Patient, Bedside RN, Mervat ALVAREZ (Wound RN).  Pressure injury and staging reviewed with Mervat ALVAREZ (Wound RN).    EVALUATION / RATIONALE FOR TREATMENT:     Date:  02/23/25  Wound Status:  Initial evaluation    Pt with POA pressure injuries to sacrum and left heel. Offloading measures in place. Pts BLE with venous stasis ulcers. xeroform (yellow) applied to provide an occlusive dressing that incorporates bacteriostatic protection.         Goals: Steady decrease in wound area and depth weekly.    NURSING PLAN OF CARE ORDERS:  Dressing changes: See Dressing Care orders  RN Prevention Protocol    NUTRITION RECOMMENDATIONS   Wound Team Recommendations:  N/A    DIET ORDERS (From admission to next 24h)       Start     Ordered    02/23/25 1424  Diet Order Diet: Level 4 - Pureed; Liquid level: Level 0 - Thin  ALL MEALS        Question Answer Comment   Diet: Level 4 - Pureed    Liquid level Level 0 -  Thin        02/23/25 1424                    PREVENTATIVE INTERVENTIONS:    Q shift Tyrel - performed per nursing policy  Q shift pressure point assessments - performed per nursing policy    Surface/Positioning  Low Airloss - Currently in Place  Reposition q 2 hours - Currently in Place    Offloading/Redistribution  Sacral offloading dressing (Silicone dressing) - Reapplied  Heel offloading dressing (Silicone dressing) - Changed  Float Heels off Bed with Pillows - Applied this Visit           Respiratory  Silicone O2 tubing - Currently in Place  Gray Foam Ear protectors - Currently in Place    Containment/Moisture Prevention    Dri-enmanuel pad - Currently in Place  Fecal ostomy - Currently in Place  Penis pouch - Currently in Place    Anticipated discharge plans:  TBD        Vac Discharge Needs:  Vac Discharge plan is purely a recommendation from wound team and not a requirement for discharge unless otherwise stated by physician.  Not Applicable Pt not on a wound vac

## 2025-02-23 NOTE — PROGRESS NOTES
Rapid Response Summary     Rapid response called at 1935 for: Altered mental status  and Hypotension     VS: Hypotensive (See Vitals Flowsheet)  Additional info: Rapid called earlier around 1630 for same patient presentation. Patient's symptoms resolved with RRT interventions.   MD Paged: Angelito  Interventions:    Imaging/Tests: repeat lactic at 2130, bladder scan in 5 hours and continue LR    Labs: NA   Medications: 5mg of midodrine once   Other: N/A  Disposition: Improved with no interventions. Primary RN updated on plan of care. Transfer not indicated at this time.  and Rapid team will continue to follow the patient.

## 2025-02-23 NOTE — PROGRESS NOTES
NOC ApRN CROSS COVER NOTE    Notified by bedside RN of increased lethargy.  Assessed patient at bedside he is snoring loudly, opens his eyes to sternal rub, able to provide name before falling back asleep.  Given history of lung mets concerned that he may be retaining CO2.  VBG shows normal CO2.  Patient is likely lethargic due to acute infection.    Blood pressures improved following most recent bolus will continue IV fluids, and continue to trend lactic acid.      Angela Lee, MSN, APRN  [facility]    Please note this dictation was created using voice recognition software.  I have made every reasonable attempt to correct obvious errors, but there may be errors of grammar and possibly content that I did not discover before finalizing the note.     Addendum:  Notified by pharmacy that patient is positive for E. coli bacteremia, changing antibiotics from Unasyn to ceftriaxone.

## 2025-02-23 NOTE — PROGRESS NOTES
Report received from RN, assumed patient care. Patient is calmly resting in bed, no signs of distress, even and unlabored breathing noted. Pt on RA. A&Ox2. Tele box on and in place. Patient has call light within reach, fall precautions in place. Rapid team at bedside upon transfer to floor.

## 2025-02-23 NOTE — PROGRESS NOTES
"Upon assessment, pt presented with hypotensive blood pressure when the nursing aide reported vitals to this nurse. This nurse went to bedside after and patient was non responsive except to pain when first assessment was responsive to name and Pt asked \"Where am I?\" This nurse alerted Charge to call Rapid Response on Patient. This nurse stayed at bedside and then Patient transferred to a telemetry. Pt responsive and BP stable upon transfer. Report given to page telemetry nurse on unit 7.  "

## 2025-02-23 NOTE — THERAPY
"Speech Language Pathology   Clinical Swallow Evaluation     Patient Name: Sebastian Sinha  AGE:  74 y.o., SEX:  male  Medical Record #: [id number]  Date of Service: 2025      History of Present Illness  74 y.o. male admitted to [facility] on  for acute metabolic encephalopathy.    CMHx: hypertension, acute cystitis s/o hematuria, benign prostatic hyperplasia w/o lower urinary tract sx, chronic anticoagulation, Jose, sepsis    PMHx: rectal cancer, metastatic lung cancer, arthritis of right hip    CXR : \"1.  Prominent cardiac silhouette may be due to patient's rotation or mild cardiomegaly.  2.  Right basilar atelectasis. Possible small right pleural effusion.  3.  Mild bilateral interstitial prominence may be due to patient's rotation or mild edema.\"    CT Head : \"1. No acute intracranial abnormality.  2. Atrophy and diffuse chronic microangiopathic white matter changes versus demyelination or gliosis.\"    General Information:  Vitals  Pulse: (!) 47  Respiration: 18  Pulse Oximetry: 98 %  O2 (LPM): 2  O2 Delivery Device: Nasal Cannula  Level of Consciousness: Alert  Patient Behaviors: Confused (Pleasant and cooperative.)  Orientation: Self, , General place  Follows Directives: Yes    Prior Living Situation & Level of Function:  Lives with - Patient's Self Care Capacity: Attendant / Paid Care Giver  Communication: WFL  Swallowing: WFL     Oral Mechanism Evaluation:  Dentition: Fair, Natural dentition   Facial Symmetry: Equal  Facial Sensation: Equal     Labial Observations: WFL   Lingual Observations: Midline  Motor Speech: WFL     Laryngeal Function:  Secretion Management: Adequate  Voice Quality: WFL   Cough: Perceptually WNL    Subjective  Pt was cooperative and agreeable to all ST tasks.    Assessment  Current Method of Nutrition: Oral diet (reg/thin)  Positioning: Hendrickson's (60-90 degrees)  Bolus Administration: SLP, Patient  O2 (LPM): 2 O2 Delivery Device: Nasal Cannula  Factor(s) Affecting Performance: " "None  Tracheostomy : No     Swallowing Trials:  Ice: WFL  Thin Liquid (TN0): WFL  Liquidised (LQ3): WFL  Easy to Chew (EC7): WFL    Comments: Pt presented w/ PO trials thin liquid comfortable cup sip, rapid, sequential thin liquid cup sips, LQ3 and EC7 w/o overt s/sx of respiratory invasion or respiratory distress. Complete and effective mastication noted. Bolus stripped completely from spoon.    Clinical Impressions  Pt reported having difficulty chewing \"tough\" foods (e.g., steak, carrots) at home; discussed IDDSI levels and patient expressed a preference for a pureed diet while in the hospital. Given no overt s/sx of dysphagia this date and no significant risk factors (e.g., pt on 2 LPM of O2, is alert), pt may advance diet as tolerated w/ SLP to follow to ck CHERISE. Education provided to pt to notify staff if dysphagia sx present. Please consult SLP should condition worsen or s/sx of dysphagia occur.    Recommendations  Pureed/thin liquids per pt preference and to CHERISE - ADAT  Instrumentation: Instrumental swallow study pending clinical progress  Medication: As tolerated  Supervision: Assist with meal tray set up, Encourage self-feeding, Close supervision - patient may be left alone for less than 5 minutes at a time  Positioning: Fully upright and midline during oral intake  Risk Management : Small bites/sips, Alternate bites and sips, Slow rate of intake, Reduce environmental distractions, Physical mobility, as tolerated  Oral Care: Pre- and post-meals    SLP Treatment Plan  Dysphagia Treatment  SLP Frequency: 4x Per Week  Estimated Duration: Until Therapy Goals Met    Anticipated Discharge Needs  Discharge Recommendations: Recommend post-acute placement for additional speech therapy services prior to discharge home   Therapy Recommendations Upon DC: Dysphagia Training, Community Re-Integration, Patient / Family / Caregiver Education      Patient / Family Goal #1: \"I prefer purees\"  Short Term Goals  Short Term " Goal # 1: Pt will tolerate least restrictive diet w/o overt s/sx respiratory invasion or respiratory distress.    Anuel Cutler, Student

## 2025-02-23 NOTE — PROGRESS NOTES
4 Eyes Skin Assessment Completed by MIKE Munoz and MIKE Diallo.    Head WDL  Ears WDL  Nose WDL  Mouth WDL  Neck WDL  Breast/Chest WDL  Shoulder Blades WDL  Spine Redness and Blanching  (R) Arm/Elbow/Hand Redness, Bruising, Abrasion, and Edema  (L) Arm/Elbow/Hand WDL  Abdomen Redness and Blanching  Groin WDL  Scrotum/Coccyx/Buttocks Redness, Non-Blanching, Excoriation, and Discoloration  (R) Leg Edema, scabs, discoloration  (L) Leg Redness, Non-Blanching, Scab, and Swelling, draining  wound posterior leg  (R) Heel/Foot/Toe Redness, Non-Blanching, and Ulcer(s)  (L) Heel/Foot/Toe WDL          Devices In Places Tele Box, Blood Pressure Cuff, and Pulse Ox      Interventions In Place NC W/Ear Foams, Sacral Mepilex, Pillows, Q2 Turns, and Low Air Loss Mattress    Possible Skin Injury Yes    Pictures Uploaded Into Epic Yes  Wound Consult Placed Yes  RN Wound Prevention Protocol Ordered Yes

## 2025-02-23 NOTE — PROGRESS NOTES
Hospital Medicine Daily Progress Note    Date of Service  2/23/2025    Chief Complaint  Sebastian Sinha is a 74 y.o. male admitted 2/22/2025 with altered mental status    Hospital Course    74 male with past medical history of hypertension, A-fib on Eliquis, rectal cancer with lung met status post chemo and radiation brought from assisted living facility for altered mental status.  On arrival labs significant for leukocytosis, hemoglobin 11K, chemistries leukocytosis, MRSA PCR positive, UA positive, 1 x 2 blood culture positive for UTI.  Initiated on IV antibiotic for ongoing sepsis.  Interval Problem Update    2/23/2025  Patient seen and examined at bedside  On 2 L oxygen  Patient is AOx3, answering all question appropriately  Overall ill-appearing, moving all extremities  Labs reviewed noted leukocytosis, hemoglobin 11, sodium 135 potassium 4.7, renal function improving BUN/creatinine 29/1.59, 1 x 2 blood culture growing E. coli, resolved lactic acidosis, UA positive for UTI  Imaging reviewed chest x-ray noted with pleural effusion, concern for mild edema, CT head with no acute abnormality, normal renal ultrasound  Chart reviewed, meds reviewed    Plan  Telemetry monitoring  Continue on Rocephin, Zyvox  Continue on Eliquis  On insulin regimen, monitor fingerstick closely  Taper down hydrocortisone to 50 3 times daily  Repeat BMP in a.m. to monitor electrolytes and renal function  Repeat CBC in a.m. to monitor white count and hemoglobin  Get wound culture, wound care evaluation  Need close monitoring of blood counts, electrolytes and renal function due to potential of organ dysfunction due to sepsis  Requiring IV antibiotics, need close monitoring for toxicity  High risk of deterioration into severe sepsis.  Need close hemodynamic monitoring  Patient has a high medical complexity, complex decision making and is at high risk of complication, morbidity and mortality      I have discussed this patient's plan of care and  discharge plan at IDT rounds today with Case Management, Nursing, Nursing leadership, and other members of the IDT team.        Code Status  Full Code    Disposition  The patient is not medically cleared for discharge to home or a post-acute facility.  Anticipate discharge to: skilled nursing facility    I have placed the appropriate orders for post-discharge needs.    Review of Systems  Review of Systems   Constitutional:  Positive for malaise/fatigue.   Cardiovascular:  Negative for chest pain.   Gastrointestinal:  Negative for nausea and vomiting.   Genitourinary:  Positive for dysuria.        Physical Exam  Temp:  [36.2 °C (97.1 °F)-38.4 °C (101.1 °F)] 36.2 °C (97.2 °F)  Pulse:  [48-98] 54  Resp:  [13-25] 17  BP: ()/(41-75) 125/75  SpO2:  [86 %-99 %] 98 %    Physical Exam  Constitutional:       Appearance: He is ill-appearing.   Cardiovascular:      Rate and Rhythm: Bradycardia present.      Pulses: Normal pulses.   Pulmonary:      Effort: Pulmonary effort is normal. No respiratory distress.   Abdominal:      General: Abdomen is flat.   Musculoskeletal:      Comments: Bilateral lower extremity edema which appears chronic, chronic open wound covered with clean dressing   Neurological:      General: No focal deficit present.      Mental Status: He is alert and oriented to person, place, and time.   Psychiatric:         Mood and Affect: Mood normal.         Fluids    Intake/Output Summary (Last 24 hours) at 2/23/2025 1222  Last data filed at 2/23/2025 1114  Gross per 24 hour   Intake 1400 ml   Output 1025 ml   Net 375 ml        Laboratory  Recent Labs     02/22/25  1140 02/23/25  0039   WBC 22.3* 20.8*   RBC 4.58* 4.02*   HEMOGLOBIN 12.8* 11.0*   HEMATOCRIT 40.0* 35.3*   MCV 87.3 87.8   MCH 27.9 27.4   MCHC 32.0* 31.2*   RDW 48.0 47.2   PLATELETCT 239 190   MPV 10.7 10.7     Recent Labs     02/22/25  1140 02/23/25  0039   SODIUM 132* 135   POTASSIUM 5.2 4.7   CHLORIDE 98 105   CO2 19* 19*   GLUCOSE 175* 197*    BUN 31* 29*   CREATININE 1.80* 1.49*   CALCIUM 9.0 8.6                   Imaging  US-RENAL   Final Result      1.  Normal renal ultrasound.      CT-HEAD W/O   Final Result      1. No acute intracranial abnormality.   2. Atrophy and diffuse chronic microangiopathic white matter changes versus demyelination or gliosis.               DX-CHEST-PORTABLE (1 VIEW)   Final Result      1.  Prominent cardiac silhouette may be due to patient's rotation or mild cardiomegaly.   2.  Right basilar atelectasis. Possible small right pleural effusion.   3.  Mild bilateral interstitial prominence may be due to patient's rotation or mild edema.           Assessment/Plan  * Sepsis (HCC)  Assessment & Plan  Sepsis resolved    2/23/2025  Telemetry monitoring  Continue on Rocephin, Zyvox  Continue on Eliquis  On insulin regimen, monitor fingerstick closely  Taper down hydrocortisone to 50 3 times daily  Repeat BMP in a.m. to monitor electrolytes and renal function  Repeat CBC in a.m. to monitor white count and hemoglobin  Get wound culture, wound care evaluation  Need close monitoring of blood counts, electrolytes and renal function due to potential of organ dysfunction due to sepsis  Requiring IV antibiotics, need close monitoring for toxicity  High risk of deterioration into severe sepsis.  Need close hemodynamic monitoring  Patient has a high medical complexity, complex decision making and is at high risk of complication, morbidity and mortality    ACP (advance care planning)  Assessment & Plan  I had a prolonged discussion with patient regarding goal of care, diagnosis prognosis, CODE STATUS.  We discussed about his active medical problem which include sepsis, bacteremia and chronic medical problem which include rectal cancer, lung cancer.  Discussed full resuscitation include chest compression, defibrillation and intubation.  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  He would not like to go through all the aggressive measure and requested for DNR/DNI.  CODE STATUS updated to DNR/DNI  I spent 19 minutes on advance care planning    Cellulitis  Assessment & Plan  Continue on Zyvox  Get wound culture  Wound care consult    Bacteremia, escherichia coli  Assessment & Plan  1 x 2 blood culture growing E. Coli.  Continue on IV antibiotics   repeat blood culture in a.m.    ARMANI (acute kidney injury) (HCC)  Assessment & Plan  Likely sepsis induced  Patient has been fluid resuscitated  Monitor her urine output  Daily BMP  Renally dose medications as appropriate  Consider further workup if he does not normalize tomorrow.  History of BPH so low threshold to check postvoid or renal ultrasound    Acute metabolic encephalopathy  Assessment & Plan  Likely in setting of sepsis  Improved on his baseline      Chronic anticoagulation  Assessment & Plan  On apixaban for history of paroxysmal atrial fibrillation    Primary hypertension  Assessment & Plan  Continue home medications with parameters    Acute cystitis without hematuria  Assessment & Plan  UA indicates infection  Place patient on ampicillin-sulbactam  Follow urine and blood cultures    Benign prostatic hyperplasia without lower urinary tract symptoms  Assessment & Plan  Continue tamsulosin  Monitor for signs of retention    Metastatic lung cancer (metastasis from lung to other site), right (HCC)- (present on admission)  Assessment & Plan  History of lung met  S/p radiation, chemo    Rectal cancer (HCC)- (present on admission)  Assessment & Plan  History of rectal cancer  Status post chemo/radiation         VTE prophylaxis: eliquis    I have performed a physical exam and reviewed and updated ROS and Plan today (2/23/2025). In review of yesterday's note (2/22/2025), there are no changes except as documented above.

## 2025-02-23 NOTE — PROGRESS NOTES
Paged by rapid response team for hemodynamic assessment and bedside evaluation    Patient is 74-year-old male with history of BPH, hypertension, chronic A-fib on Eliquis, rectal cancer s/p resection chemoradiation therapy with mets to lung s/p radiation --admitted by my colleague earlier today for acute encephalopathy secondary to sepsis due to UTI.    Rapid response called due to hypotension and worsening altered mental status.  Patient at time admission was able to recall his name and knows that he is in the hospital.  At time of rapid response assessment, he is arousable, but otherwise nonverbal and disoriented, hypotension with BP reported to be as low as 68/41.    Impression:  Septic shock  -WBC 22.3K, lactic acid 2.3, ARMANI on CKD 3 with creatinine of 1.8, acute metabolic encephalopathy, hypotension  Acute encephalopathy  Lactic acidosis  ARMANI on CKD 3, creatinine 1.8  Metabolic acidosis due to ARMANI  Leukocytosis, WBC 22.3K  Acute UTI  ? Aspiration pneumonia  +MRSA infection  Ostomy in place - ostomy care  FULL code    Plan:  STAT 30 cc/kg IVF bolus per sepsis protocol --> 3100cc  Continue maintenance IVF with LR @150cc/hr  Stress dose hydrocortisone 100 mg every 8 hours  Initiate on [id number] mg every 8 hour  MRSA nares positive-change vancomycin to Zyvox due to concern of ARMANI  He may also have right sided aspiration pneumonia-continue Unasyn  Added high dose Thiamine 500mg IV every 8hours x3 doses  Check procalcitonin, sputum culture  Aspiration precautions, speech eval  Check renal US  Bladder scan, straight cath if needed    BP improved after intervention, he is arousable, answers to calling his name, but overall remains disoriented  Agree with upgrading level care for medical unit to telemetry unit  Low threshold to upgrade to IMCU and/or ICU  Patient seen by me and evaluated at bedside in telemetry 7th floor unit    Care plan discussed with patient, primary telemetry RN, rapid response team    Critical  care time: 40 minutes spent in chart review, medical management, cording care with patient/medical unit/telemetry unit/RN/rapid response team/pharmacy/frequent reevaluation of patient clinical response to treatment

## 2025-02-23 NOTE — DISCHARGE PLANNING
Care Transition Team Assessment    Patient, Sebastian Sinha, is a 74-year-old admitted for altered level of consciousness. Patient is alert and oriented x3; information was given by the patient's friend and the  owner. Please see pt's H&P for prior medical history. Patient's friend reports patient has been living in a  since January 1 of this year; [address] [place] [address]. Contact for patient is friend Monserrat [phone]. [facility]. [facility], SW attempted to contact [facility] to obtain; left a VM. Per patient's friend, [facility] when needed. No concerns with food insecurity. Confirmed medical insurance is Medicare. [facility]. Patient requires assistance with bathing, grooming, feeding positioning - essentially bed bound;  care staff provides support.    Patient's friend provided GH owner Vandana's contact [phone]. Per Vandana, tries to encourage patient to use wheelchair but patient has refused due to pain. Per Vandana, patient's wound care comes from HH RN from [facility]. Per Vandana, Naty is patient's POA. Per Isai, patient has no family.    Identified DC needs at this time:    Possible O2  HH, on service with [facility]    Information Source  Orientation Level: Oriented to place, Oriented to person, Oriented to situation, Disoriented to time  Information Given By: Friend, Other (Comments) (Chart review)  Informant's Name: Monserrat Cutler  Who is responsible for making decisions for patient? : Patient    Readmission Evaluation  Is this a readmission?: No    Elopement Risk  Legal Hold: No  Ambulatory or Self Mobile in Wheelchair: No-Not an Elopement Risk    Interdisciplinary Discharge Planning  [facility] Physician: with [facility]  Lives with - Patient's Self Care Capacity: Attendant / Paid Care Giver  Support Systems: Home Care Staff, Friends / Neighbors  Housing / Facility: Group  Home    Discharge Preparedness  What is your plan after discharge?: [facility]  What are your discharge supports?: Other (comment) (Friend/ staff)  Prior Functional Level: Needs Assist with Medication Management, Needs Assist with Activities of Daily Living, Independent with Activities of Daily Living (Per friend, patient is essentially bed bound)  Difficulity with ADLs: Bathing, Dressing, Toileting  Difficulity with IADLs: Cooking, Driving, Laundry, Managing medication, Shopping    Functional Assesment  Prior Functional Level: Needs Assist with Medication Management, Needs Assist with Activities of Daily Living, Independent with Activities of Daily Living (Per friend, patient is essentially bed bound)    Vision / Hearing Impairment  Right Eye Vision: Wears Glasses, Impaired  Left Eye Vision: Impaired, Wears Glasses    Domestic Abuse  Have you ever been the victim of abuse or violence?: No    Anticipated Discharge Information  Discharge Disposition: Discharged to home/self care (01)

## 2025-02-23 NOTE — PROGRESS NOTES
MD notified that pt HR sustaining in the 40s. Pt complains of no symptoms, just states he is drowsy and fell back asleep. BP is stable. MD states she wanted a strip uploaded so she can review it.

## 2025-02-23 NOTE — CODE DOCUMENTATION
Dr Verma updated on patient's change in status and BP 68/41.  Received additional order for LR bolus.  LR Bolus continued.  If patient remains HOTN after bolus, update MD on new status

## 2025-02-23 NOTE — PROGRESS NOTES
Rapid Response Summary     Rapid response called at 1647 for: Altered mental status  and obtunded    VS: Bradycardia and Hypotensive (See Vitals Flowsheet)  Additional info: UTI/Sepsis  MD Paged: Sonal/Bayron  Interventions:    Imaging/Tests: N/A   Labs: Lactic Acid and ABG    Medications: Fluid   Other: PIV started   Disposition: Improved with rapid response team interventions. Primary RN updated on plan of care. Patient transferred to telemetry.

## 2025-02-24 LAB
ALBUMIN SERPL BCP-MCNC: 2.4 G/DL (ref 3.2–4.9)
ALBUMIN/GLOB SERPL: 0.6 G/DL
ALP SERPL-CCNC: 122 U/L (ref 30–99)
ALT SERPL-CCNC: 42 U/L (ref 2–50)
ANION GAP SERPL CALC-SCNC: 12 MMOL/L (ref 7–16)
ANION GAP SERPL CALC-SCNC: 13 MMOL/L (ref 7–16)
AST SERPL-CCNC: 54 U/L (ref 12–45)
BACTERIA UR CULT: ABNORMAL
BACTERIA UR CULT: ABNORMAL
BILIRUB SERPL-MCNC: 0.2 MG/DL (ref 0.1–1.5)
BUN SERPL-MCNC: 31 MG/DL (ref 8–22)
BUN SERPL-MCNC: 33 MG/DL (ref 8–22)
CALCIUM ALBUM COR SERPL-MCNC: 10 MG/DL (ref 8.5–10.5)
CALCIUM SERPL-MCNC: 8.3 MG/DL (ref 8.5–10.5)
CALCIUM SERPL-MCNC: 8.7 MG/DL (ref 8.5–10.5)
CHLORIDE SERPL-SCNC: 101 MMOL/L (ref 96–112)
CHLORIDE SERPL-SCNC: 102 MMOL/L (ref 96–112)
CO2 SERPL-SCNC: 18 MMOL/L (ref 20–33)
CO2 SERPL-SCNC: 18 MMOL/L (ref 20–33)
CREAT SERPL-MCNC: 1.19 MG/DL (ref 0.5–1.4)
CREAT SERPL-MCNC: 1.26 MG/DL (ref 0.5–1.4)
EKG IMPRESSION: NORMAL
ERYTHROCYTE [DISTWIDTH] IN BLOOD BY AUTOMATED COUNT: 46.9 FL (ref 35.9–50)
GFR SERPLBLD CREATININE-BSD FMLA CKD-EPI: 60 ML/MIN/1.73 M 2
GFR SERPLBLD CREATININE-BSD FMLA CKD-EPI: 65 ML/MIN/1.73 M 2
GLOBULIN SER CALC-MCNC: 4 G/DL (ref 1.9–3.5)
GLUCOSE BLD STRIP.AUTO-MCNC: 204 MG/DL (ref 65–99)
GLUCOSE BLD STRIP.AUTO-MCNC: 258 MG/DL (ref 65–99)
GLUCOSE BLD STRIP.AUTO-MCNC: 311 MG/DL (ref 65–99)
GLUCOSE BLD STRIP.AUTO-MCNC: 315 MG/DL (ref 65–99)
GLUCOSE SERPL-MCNC: 224 MG/DL (ref 65–99)
GLUCOSE SERPL-MCNC: 308 MG/DL (ref 65–99)
HCT VFR BLD AUTO: 35.5 % (ref 42–52)
HGB BLD-MCNC: 11.2 G/DL (ref 14–18)
L PNEUMO AG UR QL IA: NEGATIVE
MAGNESIUM SERPL-MCNC: 1.9 MG/DL (ref 1.5–2.5)
MCH RBC QN AUTO: 27.4 PG (ref 27–33)
MCHC RBC AUTO-ENTMCNC: 31.5 G/DL (ref 32.3–36.5)
MCV RBC AUTO: 86.8 FL (ref 81.4–97.8)
PHOSPHATE SERPL-MCNC: 2.1 MG/DL (ref 2.5–4.5)
PLATELET # BLD AUTO: 207 K/UL (ref 164–446)
PMV BLD AUTO: 11.4 FL (ref 9–12.9)
POTASSIUM SERPL-SCNC: 4.2 MMOL/L (ref 3.6–5.5)
POTASSIUM SERPL-SCNC: 4.3 MMOL/L (ref 3.6–5.5)
PROCALCITONIN SERPL-MCNC: 14.8 NG/ML
PROT SERPL-MCNC: 6.4 G/DL (ref 6–8.2)
RBC # BLD AUTO: 4.09 M/UL (ref 4.7–6.1)
S PNEUM AG UR QL: NEGATIVE
SIGNIFICANT IND 70042: ABNORMAL
SITE SITE: ABNORMAL
SODIUM SERPL-SCNC: 131 MMOL/L (ref 135–145)
SODIUM SERPL-SCNC: 133 MMOL/L (ref 135–145)
SOURCE SOURCE: ABNORMAL
WBC # BLD AUTO: 15.6 K/UL (ref 4.8–10.8)

## 2025-02-24 PROCEDURE — 93005 ELECTROCARDIOGRAM TRACING: CPT | Mod: TC | Performed by: STUDENT IN AN ORGANIZED HEALTH CARE EDUCATION/TRAINING PROGRAM

## 2025-02-24 PROCEDURE — 700102 HCHG RX REV CODE 250 W/ 637 OVERRIDE(OP): Performed by: HOSPITALIST

## 2025-02-24 PROCEDURE — 84100 ASSAY OF PHOSPHORUS: CPT

## 2025-02-24 PROCEDURE — A9270 NON-COVERED ITEM OR SERVICE: HCPCS | Performed by: HOSPITALIST

## 2025-02-24 PROCEDURE — 84145 PROCALCITONIN (PCT): CPT

## 2025-02-24 PROCEDURE — 97167 OT EVAL HIGH COMPLEX 60 MIN: CPT

## 2025-02-24 PROCEDURE — 85027 COMPLETE CBC AUTOMATED: CPT

## 2025-02-24 PROCEDURE — 700105 HCHG RX REV CODE 258: Performed by: NURSE PRACTITIONER

## 2025-02-24 PROCEDURE — 770020 HCHG ROOM/CARE - TELE (206)

## 2025-02-24 PROCEDURE — 97163 PT EVAL HIGH COMPLEX 45 MIN: CPT

## 2025-02-24 PROCEDURE — A9270 NON-COVERED ITEM OR SERVICE: HCPCS | Performed by: STUDENT IN AN ORGANIZED HEALTH CARE EDUCATION/TRAINING PROGRAM

## 2025-02-24 PROCEDURE — 36415 COLL VENOUS BLD VENIPUNCTURE: CPT

## 2025-02-24 PROCEDURE — 700111 HCHG RX REV CODE 636 W/ 250 OVERRIDE (IP): Mod: JZ | Performed by: STUDENT IN AN ORGANIZED HEALTH CARE EDUCATION/TRAINING PROGRAM

## 2025-02-24 PROCEDURE — 700111 HCHG RX REV CODE 636 W/ 250 OVERRIDE (IP): Performed by: NURSE PRACTITIONER

## 2025-02-24 PROCEDURE — 82962 GLUCOSE BLOOD TEST: CPT | Mod: 91

## 2025-02-24 PROCEDURE — 99497 ADVNCD CARE PLAN 30 MIN: CPT | Performed by: STUDENT IN AN ORGANIZED HEALTH CARE EDUCATION/TRAINING PROGRAM

## 2025-02-24 PROCEDURE — 93010 ELECTROCARDIOGRAM REPORT: CPT | Performed by: INTERNAL MEDICINE

## 2025-02-24 PROCEDURE — 700102 HCHG RX REV CODE 250 W/ 637 OVERRIDE(OP): Performed by: STUDENT IN AN ORGANIZED HEALTH CARE EDUCATION/TRAINING PROGRAM

## 2025-02-24 PROCEDURE — 99233 SBSQ HOSP IP/OBS HIGH 50: CPT | Mod: 25 | Performed by: STUDENT IN AN ORGANIZED HEALTH CARE EDUCATION/TRAINING PROGRAM

## 2025-02-24 PROCEDURE — 80048 BASIC METABOLIC PNL TOTAL CA: CPT

## 2025-02-24 PROCEDURE — 80053 COMPREHEN METABOLIC PANEL: CPT

## 2025-02-24 PROCEDURE — 83735 ASSAY OF MAGNESIUM: CPT

## 2025-02-24 RX ORDER — ACETAMINOPHEN 500 MG
1000 TABLET ORAL EVERY 6 HOURS PRN
Status: DISCONTINUED | OUTPATIENT
Start: 2025-02-24 | End: 2025-02-26 | Stop reason: HOSPADM

## 2025-02-24 RX ADMIN — MIDODRINE HYDROCHLORIDE 10 MG: 5 TABLET ORAL at 08:18

## 2025-02-24 RX ADMIN — ACETAMINOPHEN 1000 MG: 500 TABLET ORAL at 04:47

## 2025-02-24 RX ADMIN — ACETAMINOPHEN 1000 MG: 500 TABLET ORAL at 20:50

## 2025-02-24 RX ADMIN — INSULIN GLARGINE-YFGN 5 UNITS: 100 INJECTION, SOLUTION SUBCUTANEOUS at 17:38

## 2025-02-24 RX ADMIN — LINEZOLID 600 MG: 600 TABLET, FILM COATED ORAL at 17:28

## 2025-02-24 RX ADMIN — INSULIN LISPRO 5 UNITS: 100 INJECTION, SOLUTION INTRAVENOUS; SUBCUTANEOUS at 17:32

## 2025-02-24 RX ADMIN — APIXABAN 5 MG: 5 TABLET, FILM COATED ORAL at 04:47

## 2025-02-24 RX ADMIN — INSULIN LISPRO 6 UNITS: 100 INJECTION, SOLUTION INTRAVENOUS; SUBCUTANEOUS at 20:46

## 2025-02-24 RX ADMIN — ROSUVASTATIN CALCIUM 10 MG: 10 TABLET, FILM COATED ORAL at 04:47

## 2025-02-24 RX ADMIN — HYDROCORTISONE SODIUM SUCCINATE 50 MG: 100 INJECTION, POWDER, FOR SOLUTION INTRAMUSCULAR; INTRAVENOUS at 04:48

## 2025-02-24 RX ADMIN — CEFTRIAXONE SODIUM 2000 MG: 10 INJECTION, POWDER, FOR SOLUTION INTRAVENOUS at 04:48

## 2025-02-24 RX ADMIN — MIDODRINE HYDROCHLORIDE 10 MG: 5 TABLET ORAL at 17:29

## 2025-02-24 RX ADMIN — CYANOCOBALAMIN TAB 500 MCG 1000 MCG: 500 TAB at 04:47

## 2025-02-24 RX ADMIN — VENLAFAXINE HYDROCHLORIDE 37.5 MG: 37.5 CAPSULE, EXTENDED RELEASE ORAL at 04:48

## 2025-02-24 RX ADMIN — MIDODRINE HYDROCHLORIDE 10 MG: 5 TABLET ORAL at 11:04

## 2025-02-24 RX ADMIN — INSULIN LISPRO 3 UNITS: 100 INJECTION, SOLUTION INTRAVENOUS; SUBCUTANEOUS at 05:20

## 2025-02-24 RX ADMIN — APIXABAN 5 MG: 5 TABLET, FILM COATED ORAL at 17:29

## 2025-02-24 RX ADMIN — INSULIN LISPRO 6 UNITS: 100 INJECTION, SOLUTION INTRAVENOUS; SUBCUTANEOUS at 11:07

## 2025-02-24 RX ADMIN — LINEZOLID 600 MG: 600 TABLET, FILM COATED ORAL at 04:47

## 2025-02-24 ASSESSMENT — PATIENT HEALTH QUESTIONNAIRE - PHQ9
2. FEELING DOWN, DEPRESSED, IRRITABLE, OR HOPELESS: NOT AT ALL
1. LITTLE INTEREST OR PLEASURE IN DOING THINGS: NOT AT ALL
SUM OF ALL RESPONSES TO PHQ9 QUESTIONS 1 AND 2: 0

## 2025-02-24 ASSESSMENT — COGNITIVE AND FUNCTIONAL STATUS - GENERAL
WALKING IN HOSPITAL ROOM: A LOT
TURNING FROM BACK TO SIDE WHILE IN FLAT BAD: A LOT
STANDING UP FROM CHAIR USING ARMS: TOTAL
MOBILITY SCORE: 8
MOVING TO AND FROM BED TO CHAIR: A LOT
SUGGESTED CMS G CODE MODIFIER DAILY ACTIVITY: CL
STANDING UP FROM CHAIR USING ARMS: A LOT
DAILY ACTIVITIY SCORE: 12
TOILETING: TOTAL
DRESSING REGULAR LOWER BODY CLOTHING: TOTAL
PERSONAL GROOMING: A LOT
DRESSING REGULAR UPPER BODY CLOTHING: A LOT
DRESSING REGULAR UPPER BODY CLOTHING: A LOT
TURNING FROM BACK TO SIDE WHILE IN FLAT BAD: A LOT
MOVING FROM LYING ON BACK TO SITTING ON SIDE OF FLAT BED: A LOT
CLIMB 3 TO 5 STEPS WITH RAILING: A LOT
DAILY ACTIVITIY SCORE: 13
HELP NEEDED FOR BATHING: A LOT
HELP NEEDED FOR BATHING: TOTAL
MOVING TO AND FROM BED TO CHAIR: TOTAL
MOBILITY SCORE: 12
SUGGESTED CMS G CODE MODIFIER DAILY ACTIVITY: CL
SUGGESTED CMS G CODE MODIFIER MOBILITY: CL
SUGGESTED CMS G CODE MODIFIER MOBILITY: CM
MOVING FROM LYING ON BACK TO SITTING ON SIDE OF FLAT BED: A LOT
CLIMB 3 TO 5 STEPS WITH RAILING: TOTAL
TOILETING: A LOT
EATING MEALS: A LOT
DRESSING REGULAR LOWER BODY CLOTHING: A LOT
WALKING IN HOSPITAL ROOM: TOTAL

## 2025-02-24 ASSESSMENT — FIBROSIS 4 INDEX: FIB4 SCORE: 2.9

## 2025-02-24 ASSESSMENT — ENCOUNTER SYMPTOMS
VOMITING: 0
NAUSEA: 0

## 2025-02-24 ASSESSMENT — PAIN DESCRIPTION - PAIN TYPE: TYPE: ACUTE PAIN

## 2025-02-24 ASSESSMENT — GAIT ASSESSMENTS: GAIT LEVEL OF ASSIST: UNABLE TO PARTICIPATE

## 2025-02-24 ASSESSMENT — ACTIVITIES OF DAILY LIVING (ADL): TOILETING: DEPENDENT

## 2025-02-24 NOTE — CARE PLAN
The patient is Stable - Low risk of patient condition declining or worsening    Shift Goals  Clinical Goals: monitor VS, wean O2  Patient Goals: rest    Progress made toward(s) clinical / shift goals:  education done on POC, all questions and concerns were addressed    Problem: Pain - Standard  Goal: Alleviation of pain or a reduction in pain to the patient’s comfort goal  Outcome: Progressing     Problem: Knowledge Deficit - Standard  Goal: Patient and family/care givers will demonstrate understanding of plan of care, disease process/condition, diagnostic tests and medications  Outcome: Progressing       Patient is not progressing towards the following goals:

## 2025-02-24 NOTE — CARE PLAN
The patient is Watcher - Medium risk of patient condition declining or worsening    Shift Goals  Clinical Goals: monitor VS, wean O2  Patient Goals: rest    Progress made toward(s) clinical / shift goals:    Problem: Hemodynamics  Goal: Patient's hemodynamics, fluid balance and neurologic status will be stable or improve  Outcome: Progressing     Problem: Respiratory  Goal: Patient will achieve/maintain optimum respiratory ventilation and gas exchange  Outcome: Progressing     Problem: Fall Risk  Goal: Patient will remain free from falls  Outcome: Progressing       Patient is not progressing towards the following goals:

## 2025-02-24 NOTE — CARE PLAN
The patient is Watcher - Medium risk of patient condition declining or worsening    Shift Goals  Clinical Goals: monitor VS  Patient Goals: rets    Progress made toward(s) clinical / shift goals:    Problem: Pain - Standard  Goal: Alleviation of pain or a reduction in pain to the patient’s comfort goal  Outcome: Progressing     Problem: Hemodynamics  Goal: Patient's hemodynamics, fluid balance and neurologic status will be stable or improve  Outcome: Progressing     Problem: Fluid Volume  Goal: Fluid volume balance will be maintained  Outcome: Progressing     Problem: Urinary - Renal Perfusion  Goal: Ability to achieve and maintain adequate renal perfusion and functioning will improve  Outcome: Progressing       Patient is not progressing towards the following goals:

## 2025-02-24 NOTE — PROGRESS NOTES
Bedside shift report received from day shift RN. Pt care assumed. A&O x 2. Calm and cooperative. No complaints of pain. Telemetry in place. Bed in low and locked position. Call light and belongings within reach. All pt concerns addressed. No further assistance needed at this time.

## 2025-02-24 NOTE — THERAPY
"Physical Therapy   Initial Evaluation     Patient Name: Sebastian Sinha  Age:  74 y.o., Sex:  male  Medical Record #: [id number]  Today's Date: 2/24/2025     Precautions  Precautions: Fall Risk;Swallow Precautions    Assessment  Patient is 74 y.o. male admitted to [facility] ICU for acute metabolic encephalopathy. Dx'd with acute cystitis s/o hematuria, benign prostatic hyperplasia w/o lower urinary tract sx, ARMANI, sepsis, cellulitis, bacteremia. PMHx: rectal cancer, metastatic lung cancer, arthritis of right hip, chronic anticoagulation, afib, HTN.    Pt bed bound at the group home due to pain in \"Pelvis\". Pt with multiple wounds, moaning with bed mobility and limited ROM in bilat LE due to pain. He tends to rest bilat lower legs in the crossed position. Applied prevalon boots to assist with decrease pressure problem. Pt presents with the following Problems: Pain, Impaired Bed Mobility, Impaired Transfers, Impaired Ambulation, Functional ROM Deficit, Functional Strength Deficit, Impaired Balance, Decreased Activity Tolerance, Safety Awareness Deficits / Cognition. Please see flowsheet below for information. Pt is currently below their functional baseline and anticipate that pt will benefit from post acute placement upon DC from hospital.     Plan    Physical Therapy Initial Treatment Plan   Treatment Plan : Bed Mobility, Equipment, Neuro Re-Education / Balance, Gait Training, Self Care / Home Evaluation, Therapeutic Activities, Therapeutic Exercise  Treatment Frequency: 3 Times per Week  Duration: Until Therapy Goals Met    DC Equipment Recommendations: Unable to determine at this time  Discharge Recommendations: Recommend post-acute placement for additional physical therapy services prior to discharge home       Subjective    Pt resting in bed. Only agreed to positional change.     Objective       02/24/25 1342   Time In/Time Out   Therapy Start Time 1324   Therapy End Time 1342   Total Therapy Time 18   Initial Contact Note "    Initial Contact Note Order Received and Verified, Physical Therapy Evaluation in Progress with Full Report to Follow.   Precautions   Precautions Fall Risk;Swallow Precautions   Vitals   O2 Delivery Device None - Room Air   Pain 0 - 10 Group   Location Hip   Location Orientation Right;Left   Therapist Pain Assessment During Activity;Post Activity Pain Same as Prior to Activity  (pt would moan with movement)   Prior Living Situation   Housing / Facility Group Home   Equipment Owned Wheelchair   Lives with - Patient's Self Care Capacity Attendant / Paid Care Giver   Comments pt poor historian   Prior Level of Functional Mobility   Comments dep with bed mobility and has not been out of bed at the group home for awhile   Cognition    Cognition / Consciousness X   Orientation Level Not Oriented to Year  (AXOX2 per RN)   Level of Consciousness Alert   Safety Awareness Impaired   New Learning Impaired   Attention Impaired   Comments pt with decreased STM, pleasant   Active ROM Lower Body    Active ROM Lower Body  X   Gross Active ROM Gross Active Range of Motion Impaired,  but Appears Adequate for Functional Mobility.   Comments significant pain with ROM   Strength Lower Body   Lower Body Strength  X   Gross Strength Generalized Weakness, Equal Bilaterally   Comments pain with ROM   Neurological Concerns   Comments decreased priopriception LEs   Bed Mobility    Rolling Maximal Assist to Rt.;Maximum Assist to Lt.   Comments pt with increased pain with rolling in bed and readjusting LEs, pt deferred further mobility due to pain   Gait Analysis   Gait Level Of Assist Unable to Participate   Functional Mobility   Sit to Stand Unable to Participate   6 Clicks Assessment - How much HELP from from another person do you currently need... (If the patient hasn't done an activity recently, how much help from another person do you think he/she would need if he/she tried?)   Turning from your back to your side while in a flat bed  without using bedrails? 2   Moving from lying on your back to sitting on the side of a flat bed without using bedrails? 2   Moving to and from a bed to a chair (including a wheelchair)? 1   Standing up from a chair using your arms (e.g., wheelchair, or bedside chair)? 1   Walking in hospital room? 1   Climbing 3-5 steps with a railing? 1   6 clicks Mobility Score 8   Activity Tolerance   Comments pt did not want to do more than bed mobility due to pain in hips   Edema / Skin Assessment   Comments LE wounds/weeping   Short Term Goals    Short Term Goal # 1 Pt will be able to perform supine to/from sitting with mod A in 6 visits to progress bed mobility   Short Term Goal # 2 Pt will be able to perform sit to/from standing with FWW with mod A in 6 visits to progress transfers   Short Term Goal # 3 Pt will be able to squat pivot with max A in 6 visits to progress transfers   Education Group   Education Provided Role of Physical Therapist   Role of Physical Therapist Patient Response Patient;Acceptance;Explanation;Reinforcement Needed   Additional Comments pt stated the understanding of pressure relief. Education to pt on importance of joint mobility and physiology of arthritis   Physical Therapy Initial Treatment Plan    Treatment Plan  Bed Mobility;Equipment;Neuro Re-Education / Balance;Gait Training;Self Care / Home Evaluation;Therapeutic Activities;Therapeutic Exercise   Treatment Frequency 3 Times per Week   Duration Until Therapy Goals Met   Problem List    Problems Pain;Impaired Bed Mobility;Impaired Transfers;Impaired Ambulation;Functional ROM Deficit;Functional Strength Deficit;Impaired Balance;Decreased Activity Tolerance;Safety Awareness Deficits / Cognition   Anticipated Discharge Equipment and Recommendations   DC Equipment Recommendations Unable to determine at this time   Discharge Recommendations Recommend post-acute placement for additional physical therapy services prior to discharge home    Interdisciplinary Plan of Care Collaboration   IDT Collaboration with  Nursing;Occupational Therapist   Patient Position at End of Therapy In Bed;Call Light within Reach;Tray Table within Reach;Bed Alarm On   Collaboration Comments Rn updated

## 2025-02-25 LAB
BACTERIA BLD CULT: ABNORMAL
BACTERIA BLD CULT: ABNORMAL
BACTERIA WND AEROBE CULT: NORMAL
ERYTHROCYTE [DISTWIDTH] IN BLOOD BY AUTOMATED COUNT: 47.1 FL (ref 35.9–50)
EST. AVERAGE GLUCOSE BLD GHB EST-MCNC: 212 MG/DL
GLUCOSE BLD STRIP.AUTO-MCNC: 132 MG/DL (ref 65–99)
GLUCOSE BLD STRIP.AUTO-MCNC: 136 MG/DL (ref 65–99)
GLUCOSE BLD STRIP.AUTO-MCNC: 167 MG/DL (ref 65–99)
GLUCOSE BLD STRIP.AUTO-MCNC: 186 MG/DL (ref 65–99)
GRAM STN SPEC: NORMAL
HBA1C MFR BLD: 9 % (ref 4–5.6)
HCT VFR BLD AUTO: 36.2 % (ref 42–52)
HGB BLD-MCNC: 11.5 G/DL (ref 14–18)
M PNEUMO IGM SER IA-ACNC: 0.05 U/L
MAGNESIUM SERPL-MCNC: 1.9 MG/DL (ref 1.5–2.5)
MCH RBC QN AUTO: 27.6 PG (ref 27–33)
MCHC RBC AUTO-ENTMCNC: 31.8 G/DL (ref 32.3–36.5)
MCV RBC AUTO: 86.8 FL (ref 81.4–97.8)
PLATELET # BLD AUTO: 228 K/UL (ref 164–446)
PMV BLD AUTO: 11.6 FL (ref 9–12.9)
RBC # BLD AUTO: 4.17 M/UL (ref 4.7–6.1)
SIGNIFICANT IND 70042: ABNORMAL
SIGNIFICANT IND 70042: NORMAL
SITE SITE: ABNORMAL
SITE SITE: NORMAL
SOURCE SOURCE: ABNORMAL
SOURCE SOURCE: NORMAL
WBC # BLD AUTO: 10.4 K/UL (ref 4.8–10.8)

## 2025-02-25 PROCEDURE — A9270 NON-COVERED ITEM OR SERVICE: HCPCS | Performed by: HOSPITALIST

## 2025-02-25 PROCEDURE — 36415 COLL VENOUS BLD VENIPUNCTURE: CPT

## 2025-02-25 PROCEDURE — 700102 HCHG RX REV CODE 250 W/ 637 OVERRIDE(OP): Performed by: STUDENT IN AN ORGANIZED HEALTH CARE EDUCATION/TRAINING PROGRAM

## 2025-02-25 PROCEDURE — 99233 SBSQ HOSP IP/OBS HIGH 50: CPT | Mod: 25 | Performed by: STUDENT IN AN ORGANIZED HEALTH CARE EDUCATION/TRAINING PROGRAM

## 2025-02-25 PROCEDURE — 99497 ADVNCD CARE PLAN 30 MIN: CPT | Performed by: STUDENT IN AN ORGANIZED HEALTH CARE EDUCATION/TRAINING PROGRAM

## 2025-02-25 PROCEDURE — 83735 ASSAY OF MAGNESIUM: CPT

## 2025-02-25 PROCEDURE — 700105 HCHG RX REV CODE 258: Performed by: NURSE PRACTITIONER

## 2025-02-25 PROCEDURE — 770020 HCHG ROOM/CARE - TELE (206)

## 2025-02-25 PROCEDURE — 700102 HCHG RX REV CODE 250 W/ 637 OVERRIDE(OP): Performed by: HOSPITALIST

## 2025-02-25 PROCEDURE — 700111 HCHG RX REV CODE 636 W/ 250 OVERRIDE (IP): Performed by: NURSE PRACTITIONER

## 2025-02-25 PROCEDURE — 85027 COMPLETE CBC AUTOMATED: CPT

## 2025-02-25 PROCEDURE — 83036 HEMOGLOBIN GLYCOSYLATED A1C: CPT

## 2025-02-25 PROCEDURE — 82962 GLUCOSE BLOOD TEST: CPT | Mod: 91

## 2025-02-25 PROCEDURE — A9270 NON-COVERED ITEM OR SERVICE: HCPCS | Performed by: STUDENT IN AN ORGANIZED HEALTH CARE EDUCATION/TRAINING PROGRAM

## 2025-02-25 RX ORDER — MIDODRINE HYDROCHLORIDE 5 MG/1
5 TABLET ORAL
Status: DISCONTINUED | OUTPATIENT
Start: 2025-02-25 | End: 2025-02-25

## 2025-02-25 RX ORDER — MIDODRINE HYDROCHLORIDE 5 MG/1
5 TABLET ORAL 3 TIMES DAILY PRN
Status: DISCONTINUED | OUTPATIENT
Start: 2025-02-25 | End: 2025-02-26 | Stop reason: HOSPADM

## 2025-02-25 RX ADMIN — LINEZOLID 600 MG: 600 TABLET, FILM COATED ORAL at 04:45

## 2025-02-25 RX ADMIN — OXYCODONE 10 MG: 5 TABLET ORAL at 16:43

## 2025-02-25 RX ADMIN — ROSUVASTATIN CALCIUM 10 MG: 10 TABLET, FILM COATED ORAL at 04:45

## 2025-02-25 RX ADMIN — INSULIN LISPRO 2 UNITS: 100 INJECTION, SOLUTION INTRAVENOUS; SUBCUTANEOUS at 17:08

## 2025-02-25 RX ADMIN — MIDODRINE HYDROCHLORIDE 10 MG: 5 TABLET ORAL at 08:53

## 2025-02-25 RX ADMIN — APIXABAN 5 MG: 5 TABLET, FILM COATED ORAL at 16:42

## 2025-02-25 RX ADMIN — ACETAMINOPHEN 1000 MG: 500 TABLET ORAL at 20:36

## 2025-02-25 RX ADMIN — APIXABAN 5 MG: 5 TABLET, FILM COATED ORAL at 04:45

## 2025-02-25 RX ADMIN — BACLOFEN 2.5 MG: 5 TABLET ORAL at 16:44

## 2025-02-25 RX ADMIN — INSULIN LISPRO 2 UNITS: 100 INJECTION, SOLUTION INTRAVENOUS; SUBCUTANEOUS at 04:48

## 2025-02-25 RX ADMIN — VENLAFAXINE HYDROCHLORIDE 37.5 MG: 37.5 CAPSULE, EXTENDED RELEASE ORAL at 04:45

## 2025-02-25 RX ADMIN — OXYCODONE 10 MG: 5 TABLET ORAL at 09:02

## 2025-02-25 RX ADMIN — INSULIN GLARGINE-YFGN 5 UNITS: 100 INJECTION, SOLUTION SUBCUTANEOUS at 17:08

## 2025-02-25 RX ADMIN — OXYCODONE 10 MG: 5 TABLET ORAL at 04:58

## 2025-02-25 RX ADMIN — LINEZOLID 600 MG: 600 TABLET, FILM COATED ORAL at 16:43

## 2025-02-25 RX ADMIN — CEFTRIAXONE SODIUM 2000 MG: 10 INJECTION, POWDER, FOR SOLUTION INTRAVENOUS at 04:45

## 2025-02-25 RX ADMIN — CYANOCOBALAMIN TAB 500 MCG 1000 MCG: 500 TAB at 04:45

## 2025-02-25 SDOH — ECONOMIC STABILITY: TRANSPORTATION INSECURITY
IN THE PAST 12 MONTHS, HAS THE LACK OF TRANSPORTATION KEPT YOU FROM MEDICAL APPOINTMENTS OR FROM GETTING MEDICATIONS?: NO

## 2025-02-25 SDOH — ECONOMIC STABILITY: TRANSPORTATION INSECURITY
IN THE PAST 12 MONTHS, HAS LACK OF RELIABLE TRANSPORTATION KEPT YOU FROM MEDICAL APPOINTMENTS, MEETINGS, WORK OR FROM GETTING THINGS NEEDED FOR DAILY LIVING?: NO

## 2025-02-25 ASSESSMENT — PAIN DESCRIPTION - PAIN TYPE
TYPE: ACUTE PAIN;CHRONIC PAIN
TYPE: ACUTE PAIN;CHRONIC PAIN
TYPE: ACUTE PAIN
TYPE: ACUTE PAIN
TYPE: ACUTE PAIN;CHRONIC PAIN
TYPE: ACUTE PAIN

## 2025-02-25 ASSESSMENT — SOCIAL DETERMINANTS OF HEALTH (SDOH)
WITHIN THE PAST 12 MONTHS, YOU WORRIED THAT YOUR FOOD WOULD RUN OUT BEFORE YOU GOT THE MONEY TO BUY MORE: NEVER TRUE
WITHIN THE LAST YEAR, HAVE YOU BEEN AFRAID OF YOUR PARTNER OR EX-PARTNER?: NO
WITHIN THE LAST YEAR, HAVE YOU BEEN HUMILIATED OR EMOTIONALLY ABUSED IN OTHER WAYS BY YOUR PARTNER OR EX-PARTNER?: NO
WITHIN THE LAST YEAR, HAVE TO BEEN RAPED OR FORCED TO HAVE ANY KIND OF SEXUAL ACTIVITY BY YOUR PARTNER OR EX-PARTNER?: NO
WITHIN THE LAST YEAR, HAVE YOU BEEN KICKED, HIT, SLAPPED, OR OTHERWISE PHYSICALLY HURT BY YOUR PARTNER OR EX-PARTNER?: NO
WITHIN THE PAST 12 MONTHS, THE FOOD YOU BOUGHT JUST DIDN'T LAST AND YOU DIDN'T HAVE MONEY TO GET MORE: NEVER TRUE
IN THE PAST 12 MONTHS, HAS THE ELECTRIC, GAS, OIL, OR WATER COMPANY THREATENED TO SHUT OFF SERVICE IN YOUR HOME?: NO

## 2025-02-25 ASSESSMENT — LIFESTYLE VARIABLES
EVER HAD A DRINK FIRST THING IN THE MORNING TO STEADY YOUR NERVES TO GET RID OF A HANGOVER: NO
CONSUMPTION TOTAL: NEGATIVE
HAVE PEOPLE ANNOYED YOU BY CRITICIZING YOUR DRINKING: NO
AVERAGE NUMBER OF DAYS PER WEEK YOU HAVE A DRINK CONTAINING ALCOHOL: 0
ON A TYPICAL DAY WHEN YOU DRINK ALCOHOL HOW MANY DRINKS DO YOU HAVE: 0
TOTAL SCORE: 0
TOTAL SCORE: 0
HAVE YOU EVER FELT YOU SHOULD CUT DOWN ON YOUR DRINKING: NO
TOTAL SCORE: 0
HOW MANY TIMES IN THE PAST YEAR HAVE YOU HAD 5 OR MORE DRINKS IN A DAY: 0
ALCOHOL_USE: NO
EVER FELT BAD OR GUILTY ABOUT YOUR DRINKING: NO

## 2025-02-25 ASSESSMENT — ENCOUNTER SYMPTOMS
NAUSEA: 0
VOMITING: 0

## 2025-02-25 ASSESSMENT — FIBROSIS 4 INDEX: FIB4 SCORE: 2.9

## 2025-02-25 NOTE — PROGRESS NOTES
Hospital Medicine Daily Progress Note    Date of Service  2/24/2025    Chief Complaint  Sebastian Sinha is a 74 y.o. male admitted 2/22/2025 with altered mental status    Hospital Course    74 male with past medical history of hypertension, A-fib on Eliquis, rectal cancer with lung met status post chemo and radiation brought from assisted living facility for altered mental status.  On arrival labs significant for leukocytosis, hemoglobin 11K, chemistries leukocytosis, MRSA PCR positive, UA positive, 1 x 2 blood culture positive for UTI.  Initiated on IV antibiotic for ongoing sepsis.  Interval Problem Update    2/24/2025  Patient seen and examined at bedside  Now on room air  Patient is AO x 3, takes time to reorient  Fluctuating mental status  He answers all of my questions appropriately  Labs with improving leukocytosis white count 15.6, chemistry sodium 131 potassium 4.2, renal function improving, creatinine 1.19, wound culture pending, blood culture growing E. Coli  Chart reviewed, meds reviewed  Plan  Continue on Rocephin, Zyvox  Continue on Eliquis  On insulin regimen, monitor fingerstick closely  Discontinue hydrocortisone,  Repeat BMP in a.m. to monitor electrolytes and renal function  Repeat CBC in a.m. to monitor white count and hemoglobin  Get wound culture, wound care evaluation  Need close monitoring of blood counts, electrolytes and renal function due to potential of organ dysfunction due to sepsis  Requiring IV antibiotics, need close monitoring for toxicity  High risk of deterioration into severe sepsis.  Need close hemodynamic monitoring  Patient has a high medical complexity, complex decision making and is at high risk of complication, morbidity and mortality      I have discussed this patient's plan of care and discharge plan at IDT rounds today with Case Management, Nursing, Nursing leadership, and other members of the IDT team.        Code Status  DNAR/DNI    Disposition  The patient is not  medically cleared for discharge to home or a post-acute facility.  Anticipate discharge to: skilled nursing facility    I have placed the appropriate orders for post-discharge needs.    Review of Systems  Review of Systems   Constitutional:  Positive for malaise/fatigue.   Cardiovascular:  Negative for chest pain.   Gastrointestinal:  Negative for nausea and vomiting.   Genitourinary:  Positive for dysuria.        Physical Exam  Temp:  [36 °C (96.8 °F)-36.4 °C (97.5 °F)] 36 °C (96.8 °F)  Pulse:  [45-53] 46  Resp:  [16-18] 18  BP: (107-121)/(63-67) 118/65  SpO2:  [91 %-99 %] 99 %    Physical Exam  Constitutional:       Appearance: He is ill-appearing.   Cardiovascular:      Rate and Rhythm: Bradycardia present.      Pulses: Normal pulses.   Pulmonary:      Effort: Pulmonary effort is normal. No respiratory distress.   Abdominal:      General: Abdomen is flat.   Musculoskeletal:      Comments: Bilateral lower extremity edema which appears chronic, chronic open wound covered with clean dressing   Neurological:      General: No focal deficit present.      Mental Status: He is alert and oriented to person, place, and time.   Psychiatric:         Mood and Affect: Mood normal.         Fluids    Intake/Output Summary (Last 24 hours) at 2/24/2025 1617  Last data filed at 2/24/2025 1500  Gross per 24 hour   Intake 900 ml   Output 2000 ml   Net -1100 ml        Laboratory  Recent Labs     02/22/25  1140 02/23/25  0039 02/24/25  0220   WBC 22.3* 20.8* 15.6*   RBC 4.58* 4.02* 4.09*   HEMOGLOBIN 12.8* 11.0* 11.2*   HEMATOCRIT 40.0* 35.3* 35.5*   MCV 87.3 87.8 86.8   MCH 27.9 27.4 27.4   MCHC 32.0* 31.2* 31.5*   RDW 48.0 47.2 46.9   PLATELETCT 239 190 207   MPV 10.7 10.7 11.4     Recent Labs     02/22/25  1140 02/23/25  0039 02/24/25  0220   SODIUM 132* 135 131*   POTASSIUM 5.2 4.7 4.2   CHLORIDE 98 105 101   CO2 19* 19* 18*   GLUCOSE 175* 197* 224*   BUN 31* 29* 31*   CREATININE 1.80* 1.49* 1.19   CALCIUM 9.0 8.6 8.7                    Imaging  US-RENAL   Final Result      1.  Normal renal ultrasound.      CT-HEAD W/O   Final Result      1. No acute intracranial abnormality.   2. Atrophy and diffuse chronic microangiopathic white matter changes versus demyelination or gliosis.               DX-CHEST-PORTABLE (1 VIEW)   Final Result      1.  Prominent cardiac silhouette may be due to patient's rotation or mild cardiomegaly.   2.  Right basilar atelectasis. Possible small right pleural effusion.   3.  Mild bilateral interstitial prominence may be due to patient's rotation or mild edema.           Assessment/Plan  * Sepsis (HCC)  Assessment & Plan  Sepsis resolved    2/24/2025  Continue on Rocephin, Zyvox  Continue on Eliquis  On insulin regimen, monitor fingerstick closely  Discontinue hydrocortisone,  Repeat BMP in a.m. to monitor electrolytes and renal function  Repeat CBC in a.m. to monitor white count and hemoglobin  Get wound culture, wound care evaluation  Need close monitoring of blood counts, electrolytes and renal function due to potential of organ dysfunction due to sepsis  Requiring IV antibiotics, need close monitoring for toxicity  High risk of deterioration into severe sepsis.  Need close hemodynamic monitoring  Patient has a high medical complexity, complex decision making and is at high risk of complication, morbidity and mortality    ACP (advance care planning)  Assessment & Plan  I had a prolonged discussion with patient regarding goal of care, diagnosis prognosis, CODE STATUS.  We discussed about his active medical problem which include sepsis, bacteremia and chronic medical problem which include rectal cancer, lung cancer.  Discussed full resuscitation include chest compression, defibrillation and intubation.  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  He would not like to go through all the aggressive measure and requested for DNR/DNI.  CODE  STATUS updated to DNR/DNI  I spent 19 minutes on advance care planning    Cellulitis  Assessment & Plan  Continue on Zyvox  Get wound culture  Wound care consult    Bacteremia, escherichia coli  Assessment & Plan  1 x 2 blood culture growing E. Coli.  Continue on IV antibiotics   Follow culture sensitivities    ARMANI (acute kidney injury) (HCC)  Assessment & Plan  Likely sepsis induced  Patient has been fluid resuscitated  Monitor her urine output  Daily BMP  Renally dose medications as appropriate  Consider further workup if he does not normalize tomorrow.  History of BPH so low threshold to check postvoid or renal ultrasound    Acute metabolic encephalopathy  Assessment & Plan  Likely in setting of sepsis  Improved on his baseline      Chronic anticoagulation  Assessment & Plan  On apixaban for history of paroxysmal atrial fibrillation    Primary hypertension  Assessment & Plan  Continue home medications with parameters    Acute cystitis without hematuria  Assessment & Plan  UA indicates infection  Place patient on ampicillin-sulbactam  Follow urine and blood cultures    Benign prostatic hyperplasia without lower urinary tract symptoms  Assessment & Plan  Continue tamsulosin  Monitor for signs of retention    Metastatic lung cancer (metastasis from lung to other site), right (HCC)- (present on admission)  Assessment & Plan  History of lung met  S/p radiation, chemo    Rectal cancer (HCC)- (present on admission)  Assessment & Plan  History of rectal cancer  Status post chemo/radiation         VTE prophylaxis: eliquis    I have performed a physical exam and reviewed and updated ROS and Plan today (2/24/2025). In review of yesterday's note (2/23/2025), there are no changes except as documented above.         Greater than 53  minutes spent preparing to see patient (e.g. review of tests) obtaining and/or reviewing separately obtained history. Performing a medically appropriate examination and/ evaluation.  Counseling and  educating the patient/family/caregiver.  Ordering medications, tests, or procedures.  Referring and communicating with other health care professionals.  Documenting clinical information in EPIC.  Independently interpreting results and communicating results to patient/family/caregiver.  Care coordination.

## 2025-02-25 NOTE — DOCUMENTATION QUERY
[facility]                                                                       Query Response Note      PATIENT:               CYRUS SAAB  ACCT #:                  [id number]  MRN:                     [id number]  :                      1950  ADMIT DATE:       2025 11:34 AM  DISCH DATE:          RESPONDING  PROVIDER #:        [id number]           QUERY TEXT:    The wound care consult indicates this patient is being treated for ulcer of the following sites:    Pressure Injury Sacrum; Coccyx sDTI     Pressure injury Left heel sDTI    Based on your medical judgement, can you please confirm this clinical finding?    Note: If you agree with a diagnosis listed, please remember to include it in your concurrent daily documentation and onto the Discharge Summary    The patient's Clinical Indicators include:  - Wound care consult :   Pressure Injury Sacrum; Coccyx sDTI POA (Active), Pressure injury Left heel sDTI POA     - Treatments:  Cleansed; site care; Offloading, Cleansed/Non-selectively Debrided with:  Moist warm washcloth    - Risk factors:  advanced age, cellulitis, DM 2, history of irradiation and chemotherapy     Thank You,  Felicity Barron RN  Clinical Documentation   [email or web address]  Connect via [employer]  Options provided:   -- Pressure Injury Sacrum; Coccyx sDTI and  Pressure injury Left heel sDTI present on admission   -- Wound is not a Pressure Ulcer/DTI, (please specify type of wound)   -- Other explanation, (please specify other explanation)      Query created by: Felicity Barron on 2025 2:19 PM    RESPONSE TEXT:    Pressure Injury Sacrum; Coccyx sDTI and Pressure injury Left heel sDTI present on admission          Electronically signed by:  RIO HERNANDEZ MD 2025 7:34 AM

## 2025-02-25 NOTE — DISCHARGE PLANNING
SW received phone call from [facility]ata with [facility] regarding patient and possible advanced directives. Per Aminata, patient only listed treatment, DNR/DNI. Patient only has close friend Naty as contact.

## 2025-02-25 NOTE — DISCHARGE PLANNING
Case Management Discharge Planning    Admission Date: 2/22/2025  GMLOS: 4.9  ALOS: 3    6-Clicks ADL Score: 13  6-Clicks Mobility Score: 8  PT and/or OT Eval ordered: Yes  Post-acute Referrals Ordered: Yes  Post-acute Choice Obtained: No  Has referral(s) been sent to post-acute provider:  Yes      Anticipated Discharge Dispo: Discharge Disposition: D/T to SNF with Medicare cert in anticipation of skilled care (03)     DME Needed: No    Action(s) Taken: Updated Provider/Nurse on Discharge Plan    0900, RN CM called and spoke to Pt's friend Monserrat  to discuss discharge planning. Monserrat said Pt is not  and has no children or family. Monserrat said she is the DPOA for the Pt and she will email the document. Naty was provided email and fax number.    0911, RN CM called and spoke to Aminata Kenney  from [facility] and she said that Pt is only 10% service connected.    1100, RN CM called and spoke to Pt's friend Monserrat  to follow up on the DPOA document. She said she has not gotten the chance yet as she was busy with her new job. RN CM provided her list of accepting SNF. She cannot provide choice as of now but will callback after lunch as she is currently on a conference.    1435, RN CM called and spoke to Pt's friend Monserrat  to follow up on the DPOA document and she said she will send it now. DPOA document received and fax to [facility]. Monserrat provided SNF choice 1. [facility]. [facility] and 3 [facility].  Monserrat provided verbal consent to sign cobra form, SNF choice and IMM.    Escalations Completed: None    Medically Clear: No    Next Steps: CM will continue to assist Pt with discharge needs.      Barriers to Discharge: Medical clearance    Is the patient up for discharge tomorrow: No

## 2025-02-25 NOTE — HOSPITAL COURSE
74 male with past medical history of hypertension, A-fib on Eliquis, rectal cancer with lung met status post chemo and radiation brought from assisted living facility for altered mental status. On arrival labs significant for leukocytosis, hemoglobin 11K, chemistries leukocytosis, MRSA PCR positive, UA positive, 1 x 2 blood culture positive for UTI. Initiated on IV antibiotic for ongoing sepsis.

## 2025-02-25 NOTE — CARE PLAN
The patient is Stable - Low risk of patient condition declining or worsening    Shift Goals  Clinical Goals: monitor vitals, pain control, antibiotics, encourage bed mobility, woud cares  Patient Goals: rest  Family Goals: NA    Progress made toward(s) clinical / shift goals:  consolidate cares to allow for periods of rest. Complete passive ROM as tolerated. Compliant with wound cares      Problem: Pain - Standard  Goal: Alleviation of pain or a reduction in pain to the patient’s comfort goal  Outcome: Progressing     Problem: Skin Integrity  Goal: Skin integrity is maintained or improved  Outcome: Progressing     Problem: Fall Risk  Goal: Patient will remain free from falls  Outcome: Progressing       Patient is not progressing towards the following goals: patient with periodic refusal for turns. Education provided.

## 2025-02-25 NOTE — THERAPY
Occupational Therapy   Initial Evaluation     Patient Name: Sebastian Sinha  Age:  74 y.o., Sex:  male  Medical Record #: [id number]  Today's Date: 2/24/2025     Precautions: Fall Risk, Swallow Precautions    Assessment  Patient is 74 y.o. male admitted with ALOC and LLE wounds. Diagnosed with sepsis, UTI, ARMANI, and acute metabolic encephalopathy. PMHx of BPH, hypertension, chronic anticoagulation on apixaban, rectal cancer status postresection chemo and radiation with now mets to the lung s/p radiation. Pt seen for OT eval. Pt is pleasantly confused. Reported that he lives at a group home and stated that he has primarily been bed bound for an unknown length of time; pt reported lack of mobility has been more related to choice.    During OT eval, pt presented with pain as well as deficits in self-care tasks, balance, functional mobility, cognition, strength, ROM, and activity tolerance. He required max A to complete rolling bed, total A to complete LB dressing and toileting ADLs, and supv to complete self-feeding and grooming tasks. Pt declined any further attempts at transitioning to the EOB. Currently recommend post-acute placement prior to DC home. Will continue to follow for ongoing acute OT services.      Plan    Occupational Therapy Initial Treatment Plan   Treatment Interventions: Self Care / Activities of Daily Living, Adaptive Equipment, Neuro Re-Education / Balance, Therapeutic Exercises, Therapeutic Activity  Treatment Frequency: 2 Times per Week  Duration: Until Therapy Goals Met    DC Equipment Recommendations: Unable to determine at this time  Discharge Recommendations: Recommend post-acute placement for additional occupational therapy services prior to discharge home      Objective    Prior Living Situation   Housing / Facility Group Home   Equipment Owned Wheelchair  (Pt unable to recall what other equipment he had or equipment he had access to)   Lives with - Patient's Self Care Capacity Attendant / Paid  "Care Giver   Comments Pt appears to be a questionable historian. Reported that he has primarily been bed bound for an unknown length of time; pt reported lack of mobility has been more related to choice.   Prior Level of ADL Function   Self Feeding Independent   Grooming / Hygiene Independent   Bathing Dependent  (Pt reported that he is unable to recall when the last time he had a \"good shower.\" Sponge bathes)   Dressing Dependent   Toileting Dependent  (Reported that he voids)   Prior Level of IADL Function   Medication Management Dependent   Laundry Dependent   Kitchen Mobility Dependent   Finances Dependent   Home Management Dependent   Shopping Dependent   Precautions   Precautions Fall Risk;Swallow Precautions   Vitals   O2 Delivery Device None - Room Air   Pain 0 - 10 Group   Therapist Pain Assessment Post Activity Pain Same as Prior to Activity;Nurse Notified  (Reported a significant increase in B/L hip pain with activity. Would moan/groan with activity.)   Cognition    Cognition / Consciousness X   Orientation Level Not Oriented to Year  (At start of session reported that it was \"2005\", informed that it was 2025. At end of session reported that it was \"1995\")   Level of Consciousness Alert   Safety Awareness Impaired   New Learning Impaired   Attention Impaired   Sequencing Impaired   Initiation Impaired   Comments Required increased encouragement to participate; receptive to education regarding pathology of bed rest and importance of OOB ADLs. Will likely need reinforcement due to decreased STM.   Active ROM Upper Body   Active ROM Upper Body  X   Dominant Hand Left   Comments Distal BUE WFL; R shoulder limited to 100 degrees flexion/abduciton, L shoulder limited to 70 degress flexion/abduction. Reported having arthritis   Strength Upper Body   Upper Body Strength  X   Gross Strength Generalized Weakness, Equal Bilaterally.    Balance Assessment   Comments bed level only   Bed Mobility    Rolling Maximal " Assist to Rt.;Maximum Assist to Lt.   Comments Declined attempts at transitioning to EOB, despite edu. Reported an increase in pain at hips with rolling   ADL Assessment   Eating Supervision   Grooming Supervision  (washed face)   Lower Body Dressing Total Assist   Toileting Total Assist   How much help from another person does the patient currently need...   6 Clicks Daily Activity Score 13   Functional Mobility   Sit to Stand Unable to Participate   Edema / Skin Assessment   Comments BLE weeping   Activity Tolerance   Comments poor   Short Term Goals   Short Term Goal # 1 Pt will complete ADL txfs with mod A   Short Term Goal # 2 Pt will complete LB dressing with mod A using AE PRN   Short Term Goal # 3 Pt will sit unsupported at the EOB for 1 min in prep for ADLs   Education Group   Education Provided Role of Occupational Therapist;Pathology of bedrest   Role of Occupational Therapist Patient Response Patient;Acceptance;Explanation;Verbal Demonstration;Reinforcement Needed   Pathology of Bedrest Patient Response Patient;Acceptance;Explanation;Verbal Demonstration;Action Demonstration;Reinforcement Needed     Patient seen for team evaluation with Physical Therapist for the following reason(s):  Patient required 2 person assistance for safety and to provide effective interventions. Each discipline assisted patient with appropriate and separate goals. Due to the medical complexity, the skill of both practitioners is needed to monitor vitals, patient status, and adjust the intervention to fit the patient's needs and goals. Occupational Therapist facilitated UE assessment and participation in ADLs while Physical Therapist simultaneously treated pt according to POC.

## 2025-02-25 NOTE — DISCHARGE PLANNING
0909  DPA sent SNF referral to [facility], and [facility]. Requested by RITU Holloway.     1031  Agency/Facility Name: [facility]  Spoke To: Saundra   Outcome: DPA called to notify that pt is only 10% [facility] service [facility]. [facility] informed that pt can be accepted with the Medicare but also informed DPA that there is no bed available today. RITU Holloway notified.     1549  Agency/Facility Name: [facility]   Spoke To: Eliezer  Outcome: DPA message Eliezer via Team to inform that t anticipates medical clearance tomorrow, per Eliezer there is no isolation bed available for tomorrow.     1552  Agency/Facility Name: [facility]  Spoke To: Saundra   Outcome: DPA called to notify that pt anticipates medical clearance tomorrow. Per Saundra it is still unknown if there is a bed available for tomorrow. Saundra to call back tomorrow with bed availability updates.

## 2025-02-25 NOTE — CARE PLAN
Problem: Pain - Standard  Goal: Alleviation of pain or a reduction in pain to the patient’s comfort goal  Description: Target End Date:  Prior to discharge or change in level of care    Document on Vitals flowsheet    1.  Document pain using the appropriate pain scale per order or unit policy  2.  Educate and implement non-pharmacologic comfort measures (i.e. relaxation, distraction, massage, cold/heat therapy, etc.)  3.  Pain management medications as ordered  4.  Reassess pain after pain med administration per policy  5.  If opiods administered assess patient's response to pain medication is appropriate per POSS sedation scale  6.  Follow pain management plan developed in collaboration with patient and interdisciplinary team (including palliative care or pain specialists if applicable)  Outcome: Progressing     Problem: Knowledge Deficit - Standard  Goal: Patient and family/care givers will demonstrate understanding of plan of care, disease process/condition, diagnostic tests and medications  Description: Target End Date:  1-3 days or as soon as patient condition allows    Document in Patient Education    1.  Patient and family/caregiver oriented to unit, equipment, visitation policy and means for communicating concern  2.  Complete/review Learning Assessment  3.  Assess knowledge level of disease process/condition, treatment plan, diagnostic tests and medications  4.  Explain disease process/condition, treatment plan, diagnostic tests and medications  Outcome: Progressing     Problem: Hemodynamics  Goal: Patient's hemodynamics, fluid balance and neurologic status will be stable or improve  Description: Target End Date:  Prior to discharge or change in level of care    Document on Assessment and I/O flowsheet templates    1.  Monitor vital signs, pulse oximetry and cardiac monitor per provider order and/or policy  2.  Maintain blood pressure per provider order  3.  Hemodynamic monitoring per provider order  4.   Manage IV fluids and IV infusions  5.  Monitor intake and output  6.  Daily weights per unit policy or provider order  7.  Assess peripheral pulses and capillary refill  8.  Assess color and body temperature  9.  Position patient for maximum circulation/cardiac output  10. Monitor for signs/symptoms of excessive bleeding  11. Assess mental status, restlessness and changes in level of consciousness  12. Monitor temperature and report fever or hypothermia to provider immediately. Consideration of targeted temperature management.  Outcome: Progressing     Problem: Respiratory  Goal: Patient will achieve/maintain optimum respiratory ventilation and gas exchange  Description: Target End Date:  Prior to discharge or change in level of care    Document on Assessment flowsheet    1.  Assess and monitor rate, rhythm, depth and effort of respiration  2.  Breath sounds assessed qshift and/or as needed  3.  Assess O2 saturation, administer/titrate oxygen as ordered  4.  Position patient for maximum ventilatory efficiency  5.  Turn, cough, and deep breath with splinting to improve effectiveness  6.  Collaborate with RT to administer medication/treatments per order  7.  Encourage use of incentive spirometer and encourage patient to cough after use and utilize splinting techniques if applicable  8.  Airway suctioning  9.  Monitor sputum production for changes in color, consistency and frequency  10. Perform frequent oral hygiene  11. Alternate physical activity with rest periods  Outcome: Progressing     Problem: Skin Integrity  Goal: Skin integrity is maintained or improved  Description: Target End Date:  Prior to discharge or change in level of care    Document interventions on Skin Risk/Tyrel flowsheet groups and corresponding LDA    1.  Assess and monitor skin integrity, appearance and/or temperature  2.  Assess risk factors for impaired skin integrity and/or pressures ulcers  3.  Implement precautions to protect skin  integrity in collaboration with interdisciplinary team  4.  Implement pressure ulcer prevention protocol if at risk for skin breakdown  5.  Confirm wound care consult if at risk for skin breakdown  6.  Ensure patient use of pressure relieving devices  (Low air loss bed, waffle overlay, heel protectors, ROHO cushion, etc)  Outcome: Progressing     Problem: Fall Risk  Goal: Patient will remain free from falls  Description: Target End Date:  Prior to discharge or change in level of care    Document interventions on the [facility] Fall Risk Assessment    1.  Assess for fall risk factors  2.  Implement fall precautions  Outcome: Progressing   The patient is Stable - Low risk of patient condition declining or worsening    Shift Goals  Clinical Goals: VSS, safety, IV Atb  Patient Goals: to get better    Progress made toward(s) clinical / shift goals:  VSS, safety    Patient is not progressing towards the following goals:

## 2025-02-25 NOTE — PROGRESS NOTES
Bedside SBAR report received from night shift RN. Patient is awake and alert with call light within reach and bed in lowest locked position. Patient appears in no acute distress at this time. fall precautions in place. Patient denies any needs at this time.

## 2025-02-26 VITALS
OXYGEN SATURATION: 97 % | HEART RATE: 63 BPM | WEIGHT: 211.64 LBS | HEIGHT: 70 IN | DIASTOLIC BLOOD PRESSURE: 57 MMHG | SYSTOLIC BLOOD PRESSURE: 109 MMHG | BODY MASS INDEX: 30.3 KG/M2 | TEMPERATURE: 97.3 F | RESPIRATION RATE: 18 BRPM

## 2025-02-26 LAB
ANION GAP SERPL CALC-SCNC: 9 MMOL/L (ref 7–16)
BUN SERPL-MCNC: 24 MG/DL (ref 8–22)
CALCIUM SERPL-MCNC: 8.5 MG/DL (ref 8.5–10.5)
CHLORIDE SERPL-SCNC: 105 MMOL/L (ref 96–112)
CO2 SERPL-SCNC: 24 MMOL/L (ref 20–33)
CREAT SERPL-MCNC: 1.21 MG/DL (ref 0.5–1.4)
ERYTHROCYTE [DISTWIDTH] IN BLOOD BY AUTOMATED COUNT: 47.2 FL (ref 35.9–50)
GFR SERPLBLD CREATININE-BSD FMLA CKD-EPI: 63 ML/MIN/1.73 M 2
GLUCOSE BLD STRIP.AUTO-MCNC: 154 MG/DL (ref 65–99)
GLUCOSE BLD STRIP.AUTO-MCNC: 184 MG/DL (ref 65–99)
GLUCOSE SERPL-MCNC: 145 MG/DL (ref 65–99)
HCT VFR BLD AUTO: 37.4 % (ref 42–52)
HGB BLD-MCNC: 11.9 G/DL (ref 14–18)
MAGNESIUM SERPL-MCNC: 1.8 MG/DL (ref 1.5–2.5)
MCH RBC QN AUTO: 27.5 PG (ref 27–33)
MCHC RBC AUTO-ENTMCNC: 31.8 G/DL (ref 32.3–36.5)
MCV RBC AUTO: 86.4 FL (ref 81.4–97.8)
PHOSPHATE SERPL-MCNC: 2.5 MG/DL (ref 2.5–4.5)
PLATELET # BLD AUTO: 211 K/UL (ref 164–446)
PMV BLD AUTO: 10.7 FL (ref 9–12.9)
POTASSIUM SERPL-SCNC: 3.7 MMOL/L (ref 3.6–5.5)
RBC # BLD AUTO: 4.33 M/UL (ref 4.7–6.1)
SODIUM SERPL-SCNC: 138 MMOL/L (ref 135–145)
WBC # BLD AUTO: 9.1 K/UL (ref 4.8–10.8)

## 2025-02-26 PROCEDURE — 82962 GLUCOSE BLOOD TEST: CPT | Mod: 91

## 2025-02-26 PROCEDURE — 700102 HCHG RX REV CODE 250 W/ 637 OVERRIDE(OP): Performed by: HOSPITALIST

## 2025-02-26 PROCEDURE — 99239 HOSP IP/OBS DSCHRG MGMT >30: CPT | Performed by: STUDENT IN AN ORGANIZED HEALTH CARE EDUCATION/TRAINING PROGRAM

## 2025-02-26 PROCEDURE — A9270 NON-COVERED ITEM OR SERVICE: HCPCS | Performed by: STUDENT IN AN ORGANIZED HEALTH CARE EDUCATION/TRAINING PROGRAM

## 2025-02-26 PROCEDURE — 85027 COMPLETE CBC AUTOMATED: CPT

## 2025-02-26 PROCEDURE — 700102 HCHG RX REV CODE 250 W/ 637 OVERRIDE(OP): Performed by: STUDENT IN AN ORGANIZED HEALTH CARE EDUCATION/TRAINING PROGRAM

## 2025-02-26 PROCEDURE — 84100 ASSAY OF PHOSPHORUS: CPT

## 2025-02-26 PROCEDURE — A9270 NON-COVERED ITEM OR SERVICE: HCPCS | Performed by: HOSPITALIST

## 2025-02-26 PROCEDURE — 36415 COLL VENOUS BLD VENIPUNCTURE: CPT

## 2025-02-26 PROCEDURE — 700105 HCHG RX REV CODE 258: Performed by: NURSE PRACTITIONER

## 2025-02-26 PROCEDURE — 83735 ASSAY OF MAGNESIUM: CPT

## 2025-02-26 PROCEDURE — 80048 BASIC METABOLIC PNL TOTAL CA: CPT

## 2025-02-26 PROCEDURE — 700111 HCHG RX REV CODE 636 W/ 250 OVERRIDE (IP): Performed by: NURSE PRACTITIONER

## 2025-02-26 RX ORDER — PREGABALIN 150 MG/1
150 CAPSULE ORAL 2 TIMES DAILY
Status: SHIPPED
Start: 2025-02-26 | End: 2025-03-28

## 2025-02-26 RX ORDER — OXYCODONE HYDROCHLORIDE 5 MG/1
5 TABLET ORAL EVERY 6 HOURS PRN
Qty: 10 TABLET | Refills: 0 | Status: SHIPPED | OUTPATIENT
Start: 2025-02-26 | End: 2025-03-01

## 2025-02-26 RX ORDER — CIPROFLOXACIN 500 MG/1
500 TABLET, FILM COATED ORAL 2 TIMES DAILY
Status: ACTIVE | DISCHARGE
Start: 2025-02-26 | End: 2025-03-01

## 2025-02-26 RX ADMIN — VENLAFAXINE HYDROCHLORIDE 37.5 MG: 37.5 CAPSULE, EXTENDED RELEASE ORAL at 04:34

## 2025-02-26 RX ADMIN — TAMSULOSIN HYDROCHLORIDE 0.4 MG: 0.4 CAPSULE ORAL at 04:34

## 2025-02-26 RX ADMIN — APIXABAN 5 MG: 5 TABLET, FILM COATED ORAL at 04:34

## 2025-02-26 RX ADMIN — CEFTRIAXONE SODIUM 2000 MG: 10 INJECTION, POWDER, FOR SOLUTION INTRAVENOUS at 04:34

## 2025-02-26 RX ADMIN — OXYCODONE 10 MG: 5 TABLET ORAL at 13:02

## 2025-02-26 RX ADMIN — ROSUVASTATIN CALCIUM 10 MG: 10 TABLET, FILM COATED ORAL at 04:34

## 2025-02-26 RX ADMIN — INSULIN LISPRO 2 UNITS: 100 INJECTION, SOLUTION INTRAVENOUS; SUBCUTANEOUS at 11:59

## 2025-02-26 RX ADMIN — OXYCODONE 10 MG: 5 TABLET ORAL at 08:24

## 2025-02-26 RX ADMIN — INSULIN LISPRO 2 UNITS: 100 INJECTION, SOLUTION INTRAVENOUS; SUBCUTANEOUS at 06:23

## 2025-02-26 RX ADMIN — LINEZOLID 600 MG: 600 TABLET, FILM COATED ORAL at 04:34

## 2025-02-26 RX ADMIN — CYANOCOBALAMIN TAB 500 MCG 1000 MCG: 500 TAB at 04:34

## 2025-02-26 ASSESSMENT — PAIN DESCRIPTION - PAIN TYPE
TYPE: ACUTE PAIN

## 2025-02-26 NOTE — DISCHARGE PLANNING
0815  Agency/Facility Name: [facility]   Outcome: Saundra left a VM to inform that there is no beds available for today but will possibly have a bed tomorrow.       0930  Agency/Facility Name: [facility]   Spoke To: Ragini   Outcome: RACHEL called for bed availability today, Ragini will verify with the facility and team if the wound care was approved and call DPA back with updates. RITU Holloway notified.     1012  Agency/Facility Name: [facility]  Spoke To: Ragini  Outcome: Ragini called back to notify that pt can be admitted today. Ragini requested transport to be arranged at 1530. RITU Holloway notified.

## 2025-02-26 NOTE — PROGRESS NOTES
Telemetry Strip     Strip printed at 0735  Measurements/rhythm from strip were as follows:  Rhythm: Sinus Miguel with a BBB  HR: 42-54  Measurements: 0.14/0.12/0.46  Ectopy: occasional PVC, occasional PAC                    Normal Values  Rhythm SR  HR Range    Measurements 0.12-0.20 / 0.06-0.10  / 0.30-0.52

## 2025-02-26 NOTE — DISCHARGE PLANNING
Case Management Discharge Planning    Admission Date: 2/22/2025  GMLOS: 4.9  ALOS: 4    6-Clicks ADL Score: 13  6-Clicks Mobility Score: 8  PT and/or OT Eval ordered: Yes  Post-acute Referrals Ordered: Yes  Post-acute Choice Obtained: Yes  Has referral(s) been sent to post-acute provider:  Yes      Anticipated Discharge Dispo: Discharge Disposition: D/T to SNF with Medicare cert in anticipation of skilled care (03)    DME Needed: No    Action(s) Taken: Updated Provider/Nurse on Discharge Plan, Choice obtained, and Referral(s) sent    1225, RN RITU called and spoke to ALEXI Naty Pleitezlivan through telephone. She was informed Pt is medically cleared for discharge. Naty provided final SNF choice to [facility]. Naty provided verbal consent to sign the IMM and SNF choice. She was informed transport scheduled at 1530 today.    1240, Pt was visited at room. Pt alert and oriented x 3. Pt occasionally gets confused during conversation. Pt was informed that DPИВАН Cutler provided verbal consent for transfer to [facility]. Pt said he is in agreement to this and he provided verbal consent to sign SNF choice for [facility] and [facility] form.    [facility] # [id number]    1318, [facility] transfer packet given to MIKE HIDALGO    Escalations Completed: None    Medically Clear: Yes    Next Steps: CM will continue to assist Pt with discharge needs.      Barriers to Discharge: None

## 2025-02-26 NOTE — CARE PLAN
The patient is Stable - Low risk of patient condition declining or worsening    Shift Goals  Clinical Goals: VSS, pain mangement, offloading wound sites, iv abx  Patient Goals: sleep  Family Goals: DIANNA    Pt's pain has been treated per MAR. Pt is refusing Q2 turns, education has been provided to patient and pt continues to refuse.    Progress made toward(s) clinical / shift goals:    Problem: Pain - Standard  Goal: Alleviation of pain or a reduction in pain to the patient’s comfort goal  Description: Target End Date:  Prior to discharge or change in level of care    Document on Vitals flowsheet    1.  Document pain using the appropriate pain scale per order or unit policy  2.  Educate and implement non-pharmacologic comfort measures (i.e. relaxation, distraction, massage, cold/heat therapy, etc.)  3.  Pain management medications as ordered  4.  Reassess pain after pain med administration per policy  5.  If opiods administered assess patient's response to pain medication is appropriate per POSS sedation scale  6.  Follow pain management plan developed in collaboration with patient and interdisciplinary team (including palliative care or pain specialists if applicable)  Outcome: Progressing     Problem: Knowledge Deficit - Standard  Goal: Patient and family/care givers will demonstrate understanding of plan of care, disease process/condition, diagnostic tests and medications  Description: Target End Date:  1-3 days or as soon as patient condition allows    Document in Patient Education    1.  Patient and family/caregiver oriented to unit, equipment, visitation policy and means for communicating concern  2.  Complete/review Learning Assessment  3.  Assess knowledge level of disease process/condition, treatment plan, diagnostic tests and medications  4.  Explain disease process/condition, treatment plan, diagnostic tests and medications  Outcome: Progressing     Problem: Hemodynamics  Goal: Patient's hemodynamics, fluid  balance and neurologic status will be stable or improve  Description: Target End Date:  Prior to discharge or change in level of care    Document on Assessment and I/O flowsheet templates    1.  Monitor vital signs, pulse oximetry and cardiac monitor per provider order and/or policy  2.  Maintain blood pressure per provider order  3.  Hemodynamic monitoring per provider order  4.  Manage IV fluids and IV infusions  5.  Monitor intake and output  6.  Daily weights per unit policy or provider order  7.  Assess peripheral pulses and capillary refill  8.  Assess color and body temperature  9.  Position patient for maximum circulation/cardiac output  10. Monitor for signs/symptoms of excessive bleeding  11. Assess mental status, restlessness and changes in level of consciousness  12. Monitor temperature and report fever or hypothermia to provider immediately. Consideration of targeted temperature management.  Outcome: Progressing     Problem: Fluid Volume  Goal: Fluid volume balance will be maintained  Description: Target End Date:  Prior to discharge or change in level of care    Document on I/O flowsheet    1.  Monitor intake and output as ordered  2.  Promote oral intake as appropriate  3.  Report inadequate intake or output to physician  4.  Administer IV therapy as ordered  5.  Weights per provider order  6.  Assess for signs and symptoms of bleeding  7.  Monitor for signs of fluid overload (respiratory changes, edema, weight gain, increased abdominal girth)  8.  Monitor of signs for inadequate fluid volume (poor skin turgor, dry mucous membranes)  9.  Instruct patient on adherence to fluid restrictions  Outcome: Progressing     Problem: Respiratory  Goal: Patient will achieve/maintain optimum respiratory ventilation and gas exchange  Description: Target End Date:  Prior to discharge or change in level of care    Document on Assessment flowsheet    1.  Assess and monitor rate, rhythm, depth and effort of  respiration  2.  Breath sounds assessed qshift and/or as needed  3.  Assess O2 saturation, administer/titrate oxygen as ordered  4.  Position patient for maximum ventilatory efficiency  5.  Turn, cough, and deep breath with splinting to improve effectiveness  6.  Collaborate with RT to administer medication/treatments per order  7.  Encourage use of incentive spirometer and encourage patient to cough after use and utilize splinting techniques if applicable  8.  Airway suctioning  9.  Monitor sputum production for changes in color, consistency and frequency  10. Perform frequent oral hygiene  11. Alternate physical activity with rest periods  Outcome: Progressing     Problem: Skin Integrity  Goal: Skin integrity is maintained or improved  Description: Target End Date:  Prior to discharge or change in level of care    Document interventions on Skin Risk/Tyrel flowsheet groups and corresponding LDA    1.  Assess and monitor skin integrity, appearance and/or temperature  2.  Assess risk factors for impaired skin integrity and/or pressures ulcers  3.  Implement precautions to protect skin integrity in collaboration with interdisciplinary team  4.  Implement pressure ulcer prevention protocol if at risk for skin breakdown  5.  Confirm wound care consult if at risk for skin breakdown  6.  Ensure patient use of pressure relieving devices  (Low air loss bed, waffle overlay, heel protectors, ROHO cushion, etc)  Outcome: Progressing     Problem: Fall Risk  Goal: Patient will remain free from falls  Description: Target End Date:  Prior to discharge or change in level of care    Document interventions on the [facility] Fall Risk Assessment    1.  Assess for fall risk factors  2.  Implement fall precautions  Outcome: Progressing       Patient is not progressing towards the following goals:

## 2025-02-26 NOTE — PROGRESS NOTES
Hospital Medicine Daily Progress Note    Date of Service  2/25/2025    Chief Complaint  Sebastian Sinha is a 74 y.o. male admitted 2/22/2025 with altered mental status    Hospital Course    74 male with past medical history of hypertension, A-fib on Eliquis, rectal cancer with lung met status post chemo and radiation brought from assisted living facility for altered mental status.  On arrival labs significant for leukocytosis, hemoglobin 11K, chemistries leukocytosis, MRSA PCR positive, UA positive, 1 x 2 blood culture positive for UTI.  Initiated on IV antibiotic for ongoing sepsis.    Interval Problem Update  -Notes were extensively reviewed from primary team, radiology, ED, surgery, specialists, etc.   -Reviewed labs to date, microbiology for current admit and prior  -Imaging was independently reviewed and interpreted    Seen patient at bedside  He is Aox4  Continue iv ceftriaxone for UTI and bacteremia  Weaning midodrine  Zyvox for leg wound     Repeat BMP in a.m. to monitor electrolytes and renal function  Repeat CBC in a.m. to monitor white count and hemoglobin  Get wound culture, wound care evaluation  Need close monitoring of blood counts, electrolytes and renal function due to potential of organ dysfunction due to sepsis  Requiring IV antibiotics, need close monitoring for toxicity  High risk of deterioration into severe sepsis.  Need close hemodynamic monitoring  Patient has a high medical complexity, complex decision making and is at high risk of complication, morbidity and mortality      I have discussed this patient's plan of care and discharge plan at IDT rounds today with Case Management, Nursing, Nursing leadership, and other members of the IDT team.        Code Status  DNAR/DNI    Disposition  The patient is not medically cleared for discharge to home or a post-acute facility.      I have placed the appropriate orders for post-discharge needs.    Review of Systems  Review of Systems   Constitutional:   Positive for malaise/fatigue.   Cardiovascular:  Negative for chest pain.   Gastrointestinal:  Negative for nausea and vomiting.   Genitourinary:  Positive for dysuria.        Physical Exam  Temp:  [36.3 °C (97.3 °F)-36.5 °C (97.7 °F)] 36.3 °C (97.3 °F)  Pulse:  [48-59] 53  Resp:  [16-20] 18  BP: (121-129)/(56-69) 123/69  SpO2:  [93 %-98 %] 93 %    Physical Exam  Constitutional:       Appearance: He is ill-appearing.   Cardiovascular:      Rate and Rhythm: Bradycardia present.      Pulses: Normal pulses.   Pulmonary:      Effort: Pulmonary effort is normal. No respiratory distress.   Abdominal:      General: Abdomen is flat.   Musculoskeletal:      Comments: Bilateral lower extremity edema which appears chronic, chronic open wound covered with clean dressing   Neurological:      General: No focal deficit present.      Mental Status: He is alert and oriented to person, place, and time.   Psychiatric:         Mood and Affect: Mood normal.         Fluids    Intake/Output Summary (Last 24 hours) at 2/25/2025 2102  Last data filed at 2/25/2025 2034  Gross per 24 hour   Intake 250 ml   Output 2245 ml   Net -1995 ml        Laboratory  Recent Labs     02/23/25  0039 02/24/25  0220 02/25/25  0426   WBC 20.8* 15.6* 10.4   RBC 4.02* 4.09* 4.17*   HEMOGLOBIN 11.0* 11.2* 11.5*   HEMATOCRIT 35.3* 35.5* 36.2*   MCV 87.8 86.8 86.8   MCH 27.4 27.4 27.6   MCHC 31.2* 31.5* 31.8*   RDW 47.2 46.9 47.1   PLATELETCT 190 207 228   MPV 10.7 11.4 11.6     Recent Labs     02/23/25  0039 02/24/25  0220 02/24/25  1657   SODIUM 135 131* 133*   POTASSIUM 4.7 4.2 4.3   CHLORIDE 105 101 102   CO2 19* 18* 18*   GLUCOSE 197* 224* 308*   BUN 29* 31* 33*   CREATININE 1.49* 1.19 1.26   CALCIUM 8.6 8.7 8.3*                   Imaging  US-RENAL   Final Result      1.  Normal renal ultrasound.      CT-HEAD W/O   Final Result      1. No acute intracranial abnormality.   2. Atrophy and diffuse chronic microangiopathic white matter changes versus demyelination  or gliosis.               DX-CHEST-PORTABLE (1 VIEW)   Final Result      1.  Prominent cardiac silhouette may be due to patient's rotation or mild cardiomegaly.   2.  Right basilar atelectasis. Possible small right pleural effusion.   3.  Mild bilateral interstitial prominence may be due to patient's rotation or mild edema.           Assessment/Plan  * Sepsis (HCC)  Assessment & Plan  Sepsis resolved    2/25/2025  Continue on Rocephin, Zyvox  Continue on Eliquis  On insulin regimen, monitor fingerstick closely  Discontinue hydrocortisone,  Repeat BMP in a.m. to monitor electrolytes and renal function  Repeat CBC in a.m. to monitor white count and hemoglobin  Get wound culture, wound care evaluation  Need close monitoring of blood counts, electrolytes and renal function due to potential of organ dysfunction due to sepsis  Requiring IV antibiotics, need close monitoring for toxicity  High risk of deterioration into severe sepsis.  Need close hemodynamic monitoring  Patient has a high medical complexity, complex decision making and is at high risk of complication, morbidity and mortality    ACP (advance care planning)  Assessment & Plan  I had a prolonged discussion with patient regarding goal of care, diagnosis prognosis, CODE STATUS.  We discussed about his active medical problem which include sepsis, bacteremia and chronic medical problem which include rectal cancer, lung cancer.  Discussed full resuscitation include chest compression, defibrillation and intubation.  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  He would not like to go through all the aggressive measure and requested for DNR/DNI.  CODE STATUS updated to DNR/DNI  I spent 19 minutes on advance care planning    Cellulitis  Assessment & Plan  Continue on Zyvox  Get wound culture  Wound care consult    Bacteremia, escherichia coli  Assessment & Plan  1 x 2 blood culture  growing E. Coli.  Continue on IV antibiotics   Follow culture sensitivities    ARMANI (acute kidney injury) (HCC)  Assessment & Plan  Likely sepsis induced  Patient has been fluid resuscitated  Monitor her urine output  Daily BMP  Renally dose medications as appropriate  Consider further workup if he does not normalize tomorrow.  History of BPH so low threshold to check postvoid or renal ultrasound    Acute metabolic encephalopathy  Assessment & Plan  Likely in setting of sepsis  Improved on his baseline      Chronic anticoagulation  Assessment & Plan  On apixaban for history of paroxysmal atrial fibrillation    Primary hypertension  Assessment & Plan  Continue home medications with parameters    Acute cystitis without hematuria  Assessment & Plan  UA indicates infection  Urine culture E coli  Continue iv ceftriaxone    Benign prostatic hyperplasia without lower urinary tract symptoms  Assessment & Plan  Continue tamsulosin  Monitor for signs of retention    Metastatic lung cancer (metastasis from lung to other site), right (HCC)- (present on admission)  Assessment & Plan  History of lung met  S/p radiation, chemo    Rectal cancer (HCC)- (present on admission)  Assessment & Plan  History of rectal cancer  Status post chemo/radiation         VTE prophylaxis: eliquis    I have performed a physical exam and reviewed and updated ROS and Plan today (2/25/2025). In review of yesterday's note (2/24/2025), there are no changes except as documented above.         Greater than 51  minutes spent preparing to see patient (e.g. review of tests) obtaining and/or reviewing separately obtained history. Performing a medically appropriate examination and/ evaluation.  Counseling and educating the patient/family/caregiver.  Ordering medications, tests, or procedures.  Referring and communicating with other health care professionals.  Documenting clinical information in EPIC.  Independently interpreting results and communicating results  to patient/family/caregiver.  Care coordination.

## 2025-02-26 NOTE — DISCHARGE PLANNING
DC Transport Scheduled    Transport Company Scheduled:  [employer]  Spoke with [employer] to schedule transport.    Scheduled Date: 2/26/2025  Scheduled Time: 1530    Transport Type: [facility]  Destination:   [facility]   Destination address: [address] [place] [address]    Notified care team of scheduled transport via [employer].     If there are any changes needed to the DC transportation scheduled, please contact [facility] at ext. [phone] between the hours of [phone]. If outside those hours, contact the ED Case Manager at ext. [phone].

## 2025-02-26 NOTE — PROGRESS NOTES
Bedside report received from Alma RN. Pt assessment complete. Pt AO x 4. Reviewed plan of care with pt. Pt is tele monitored. Chart and labs reviewed. Bed in lowest position, and 2 side rails up. Pt educated on call lights, call light within reach. Hourly rounding in place

## 2025-02-26 NOTE — PROGRESS NOTES
Monitor Summary:     SB  (O) PVC (R) PVC  HR 43-57  0.16/0.11/0.45

## 2025-02-26 NOTE — DISCHARGE SUMMARY
Discharge Summary    CHIEF COMPLAINT ON ADMISSION  Chief Complaint   Patient presents with    ALOC    Fever       Reason for Admission  Acute metabolic encephalopathy    Admission Date  2/22/2025     CODE STATUS  DNAR/DNI    HPI & HOSPITAL COURSE  This is a 74 male with past medical history of hypertension, A-fib on Eliquis, rectal cancer with lung met status post chemo and radiation, who was brought from assisted living facility for altered mental status on 2/22.     On arrival labs significant for leukocytosis of wbc 22.3. UA positive for UTI. Urine culture and blood culture returned positive for E coli. Patient was started with iv ceftriaxone. He is also noted to have leg wound and was started with Zyvox for wound infection. Wound care on board. Patient's mental status and leukocytosis improving significantly. He is transitioned to Ciprofloxacin to complete the course of antibiotics.     Patient was evaluated by PT/OT who recommends post acute placement.     He is noted to have bradycardia with HR down to 48. I have discontinued his home med metoprolol.     Therefore, he is discharged in good and stable condition to skilled nursing facility.    The patient met 2-midnight criteria for an inpatient stay at the time of discharge.      FOLLOW UP ITEMS POST DISCHARGE  PCP in one week     DISCHARGE DIAGNOSES  Principal Problem:    Sepsis (HCC) (POA: Unknown)  Active Problems:    Rectal cancer (HCC) (POA: Yes)      Overview: IMO load March 2020    Metastatic lung cancer (metastasis from lung to other site), right (HCC) (POA: Yes)    Benign prostatic hyperplasia without lower urinary tract symptoms (POA: Unknown)    Acute cystitis without hematuria (POA: Unknown)    Primary hypertension (POA: Unknown)    Chronic anticoagulation (POA: Unknown)    Acute metabolic encephalopathy (POA: Unknown)    ARMANI (acute kidney injury) (HCC) (POA: Unknown)    Bacteremia, escherichia coli (POA: Unknown)    Cellulitis (POA: Unknown)    ACP  (advance care planning) (POA: Unknown)  Resolved Problems:    * No resolved hospital problems. *      FOLLOW UP  No future appointments.  [facility]  [address] [address]  [phone]          MEDICATIONS ON DISCHARGE     Medication List        START taking these medications        Instructions   ciprofloxacin 500 MG Tabs  Commonly known as: Cipro   Take 1 Tablet by mouth 2 times a day for 3 days.  Dose: 500 mg     oxyCODONE immediate-release 5 MG Tabs  Commonly known as: Roxicodone   Take 1 Tablet by mouth every 6 hours as needed for Severe Pain for up to 3 days.  Dose: 5 mg            CONTINUE taking these medications        Instructions   acetaminophen 500 MG Tabs  Commonly known as: Tylenol   Take 1,000 mg by mouth every 8 hours as needed for Mild Pain. 2 tablets = 1,000 mg.  Dose: 1,000 mg     baclofen 5 MG Tabs  Commonly known as: Lioresal   Take 2.5 mg by mouth 3 times a day as needed (Muscle Spasms). 1/2 tablet = 2.5 mg.  Dose: 2.5 mg     diclofenac sodium 1 % Gel  Commonly known as: Voltaren   Apply 2 g topically 4 times a day as needed (Joint Pain).  Dose: 2 g     Eliquis 5mg Tabs  Generic drug: apixaban   Take 5 mg by mouth 2 times a day.  Dose: 5 mg     lidocaine 5 % Ptch  Commonly known as: Lidoderm   Place 1 Patch on the skin every 24 hours. 12 hours on, 12 hours off.  Dose: 1 Patch     metformin 1000 MG tablet  Commonly known as: Glucophage   Take 1,000 mg by mouth 2 times a day with meals.  Dose: 1,000 mg     miconazole 2 % powder  Commonly known as: Micotin   Apply 1 Application topically 2 times a day. Apply to groin.  Dose: 1 Application     pregabalin 150 MG Caps  Commonly known as: Lyrica   Take 1 Capsule by mouth 2 times a day for 30 days.  Dose: 150 mg     rosuvastatin 10 MG Tabs  Commonly known as: Crestor   Take 10 mg by mouth every day.  Dose: 10 mg     tamsulosin 0.4 MG capsule  Commonly known as: Flomax   Take 0.4 mg by mouth every day.  "Take 30 minutes after the same meal each day.  Dose: 0.4 mg     triamcinolone acetonide 0.1 % Crea  Commonly known as: Kenalog   Apply 1 Application topically every day. Use as directed for wound care.  Dose: 1 Application     venlafaxine XR 37.5 MG Cp24  Commonly known as: Effexor XR   Take 37.5 mg by mouth every day.  Dose: 37.5 mg     vitamin B-12 1000 MCG Tabs   Take 1,000 mcg by mouth every day.  Dose: 1,000 mcg     Zituvio 100 MG Tabs  Generic drug: SITagliptin   Take 100 mg by mouth every day.  Dose: 100 mg            STOP taking these medications      cefUROXime 500 MG Tabs  Commonly known as: Ceftin     metoprolol SR 25 MG Tb24  Commonly known as: Toprol XL              Allergies  Allergies   Allergen Reactions    Gabapentin Unspecified     \"Dizziness\"       DIET  Orders Placed This Encounter   Procedures    Diet Order Diet: Level 4 - Pureed; Liquid level: Level 0 - Thin     Standing Status:   Standing     Number of Occurrences:   1     Diet::   Level 4 - Pureed [25]     Liquid level:   Level 0 - Thin       ACTIVITY  As tolerated.  Weight bearing as tolerated    LINES, DRAINS, AND WOUNDS  This is an automated list. Peripheral IVs will be removed prior to discharge.  Peripheral IV 02/22/25 18 G Anterior;Proximal;Right Forearm (Active)   Site Assessment Clean;Dry;Intact 02/26/25 0824   Dressing Type Transparent;Tape 02/26/25 0824   Line Status Saline locked;Scrubbed the hub prior to access;Flushed;Blood return noted 02/26/25 0824   Dressing Status Clean;Dry;Intact 02/26/25 0824   Dressing Intervention N/A 02/26/25 0824   Infiltration Grading ([facility], [facility]) 0 02/26/25 0824   Phlebitis Scale ([facility] Only) 0 02/26/25 0824     External Urinary Catheter ([facility]) (Active)   Collection Container Standard drainage bag 02/25/25 1200   Output (mL) 400 mL 02/26/25 0545      Wound 02/22/25 Venous Ulcer Leg Anterior;Lateral Left (Active)   Wound Image     02/23/25 1200   Site Assessment DIANNA 02/25/25 2034   Periwound " Assessment Red;Excoriated;Warm 02/25/25 2034   Margins DIANNA 02/25/25 2034   Closure Secondary intention 02/25/25 2034   Drainage Amount DIANNA 02/25/25 2034   Drainage Description DIANAN 02/25/25 2034   Treatments Cleansed;Site care;Offloading 02/25/25 1604   Offloading/DME Heel float boot 02/25/25 2034   Wound Cleansing Approved Wound Cleanser 02/25/25 1604   Periwound Protectant No-sting Skin Prep 02/25/25 1604   Dressing Status Clean;Dry;Intact 02/25/25 2034   Dressing Changed Changed 02/25/25 1604   Dressing Cleansing/Solutions Not Applicable 02/25/25 1604   Dressing Options Petrolatum Gauze (yellow) 02/25/25 1604   Dressing Change/Treatment Frequency Daily, and As Needed 02/25/25 2034   NEXT Dressing Change/Treatment Date 02/26/25 02/25/25 2034   NEXT Weekly Photo (Inpatient Only) 03/02/25 02/23/25 1200   Wound Team Following Not following 02/23/25 1200   Non-staged Wound Description Partial thickness 02/23/25 1200       Wound 02/22/25 Pressure Injury Sacrum;Coccyx sDTI POA (Active)   Wound Image   02/23/25 1200   Site Assessment DIANNA 02/25/25 2034   Periwound Assessment DIANNA 02/25/25 2034   Margins DIANNA 02/25/25 2034   Closure DIANNA 02/25/25 2034   Drainage Amount DIANNA 02/25/25 2034   Treatments Offloading 02/25/25 2034   Offloading/DME Other (comment) 02/25/25 2034   Wound Cleansing Foam Cleanser/Washcloth 02/23/25 1200   Dressing Status Open to Air 02/24/25 2000   Dressing Changed Observed 02/25/25 0800   Dressing Cleansing/Solutions Not Applicable 02/25/25 2034   Dressing Options Offloading Dressing - Sacral 02/25/25 2034   Dressing Change/Treatment Frequency Every 72 hrs, and As Needed 02/25/25 2034   NEXT Dressing Change/Treatment Date 02/26/25 02/25/25 2034   NEXT Weekly Photo (Inpatient Only) 03/02/25 02/23/25 1200   Wound Team Following Not following 02/23/25 1200   WOUND NURSE ONLY - Pressure Injury Stage DTPI 02/23/25 1200       Wound 02/22/25 Pressure Injury Left sDTI POA (Active)   Wound Image   02/23/25 1200    Site Assessment DIANNA 02/25/25 2034   Periwound Assessment DIANNA 02/25/25 2034   Margins DIANNA 02/25/25 2034   Closure DIANNA 02/25/25 2034   Drainage Amount DIANNA 02/25/25 2034   Treatments Offloading 02/25/25 2034   Offloading/DME Other (comment) 02/25/25 2034   Dressing Status Clean;Dry;Intact 02/25/25 2034   Dressing Changed Observed 02/25/25 0800   Dressing Cleansing/Solutions Not Applicable 02/23/25 1200   Dressing Options Offloading Dressing - Heel 02/25/25 2034   Dressing Change/Treatment Frequency Every 72 hrs, and As Needed 02/25/25 2034   NEXT Dressing Change/Treatment Date 02/26/25 02/25/25 2034   NEXT Weekly Photo (Inpatient Only) 03/02/25 02/23/25 1200   Wound Team Following Not following 02/23/25 1200   WOUND NURSE ONLY - Pressure Injury Stage DTPI 02/23/25 1200       Peripheral IV 02/22/25 18 G Anterior;Proximal;Right Forearm (Active)   Site Assessment Clean;Dry;Intact 02/26/25 0824   Dressing Type Transparent;Tape 02/26/25 0824   Line Status Saline locked;Scrubbed the hub prior to access;Flushed;Blood return noted 02/26/25 0824   Dressing Status Clean;Dry;Intact 02/26/25 0824   Dressing Intervention N/A 02/26/25 0824   Infiltration Grading ([facility], [facility]) 0 02/26/25 0824   Phlebitis Scale ([facility] Only) 0 02/26/25 0824               MENTAL STATUS ON TRANSFER             CONSULTATIONS  na    PROCEDURES  na    LABORATORY  Lab Results   Component Value Date    SODIUM 138 02/26/2025    POTASSIUM 3.7 02/26/2025    CHLORIDE 105 02/26/2025    CO2 24 02/26/2025    GLUCOSE 145 (H) 02/26/2025    BUN 24 (H) 02/26/2025    CREATININE 1.21 02/26/2025        Lab Results   Component Value Date    WBC 9.1 02/26/2025    HEMOGLOBIN 11.9 (L) 02/26/2025    HEMATOCRIT 37.4 (L) 02/26/2025    PLATELETCT 211 02/26/2025      US-RENAL   Final Result      1.  Normal renal ultrasound.      CT-HEAD W/O   Final Result      1. No acute intracranial abnormality.   2. Atrophy and diffuse chronic microangiopathic white matter changes versus  demyelination or gliosis.               DX-CHEST-PORTABLE (1 VIEW)   Final Result      1.  Prominent cardiac silhouette may be due to patient's rotation or mild cardiomegaly.   2.  Right basilar atelectasis. Possible small right pleural effusion.   3.  Mild bilateral interstitial prominence may be due to patient's rotation or mild edema.          Total time of the discharge process 34 minutes.

## 2025-02-26 NOTE — PROGRESS NOTES
Bedside report received and patient care assumed. Pt is resting in bed, A&O4, with pain 3/10 treated per MAR, and is on RA. Tele box on. All fall precautions are in place, belongings at bedside table.  Pt was updated on POC, no questions or concerns. Pt educated on use of call light for assistance.

## 2025-02-27 LAB
BACTERIA BLD CULT: NORMAL
SIGNIFICANT IND 70042: NORMAL
SITE SITE: NORMAL
SOURCE SOURCE: NORMAL

## 2025-02-27 NOTE — PROGRESS NOTES
Patient is A&Ox3. Discharge instructions. Personal belongings in possession with patient. PIV and tele monitor removed. Copy of discharge instructions in patient chart, signed and reviewed. Patient verbalizes the understanding of the discharge instructions. Questions and concerns addressed prior to leaving the unit.Transported via gurney by [email or web address]. Patient discharged to [facility].

## 2025-03-20 ENCOUNTER — HOSPITAL ENCOUNTER (INPATIENT)
Facility: MEDICAL CENTER | Age: 75
LOS: 4 days | DRG: 602 | End: 2025-03-24
Attending: EMERGENCY MEDICINE | Admitting: HOSPITALIST
Payer: COMMERCIAL

## 2025-03-20 ENCOUNTER — APPOINTMENT (OUTPATIENT)
Dept: RADIOLOGY | Facility: MEDICAL CENTER | Age: 75
DRG: 602 | End: 2025-03-20
Attending: EMERGENCY MEDICINE
Payer: COMMERCIAL

## 2025-03-20 ENCOUNTER — APPOINTMENT (OUTPATIENT)
Dept: RADIOLOGY | Facility: MEDICAL CENTER | Age: 75
DRG: 602 | End: 2025-03-20
Payer: COMMERCIAL

## 2025-03-20 DIAGNOSIS — N39.0 URINARY TRACT INFECTION WITHOUT HEMATURIA, SITE UNSPECIFIED: ICD-10-CM

## 2025-03-20 DIAGNOSIS — R41.82 ALTERED MENTAL STATUS, UNSPECIFIED ALTERED MENTAL STATUS TYPE: ICD-10-CM

## 2025-03-20 DIAGNOSIS — N17.9 AKI (ACUTE KIDNEY INJURY) (HCC): ICD-10-CM

## 2025-03-20 DIAGNOSIS — L03.116 LEFT LEG CELLULITIS: ICD-10-CM

## 2025-03-20 PROBLEM — Z66 DO NOT RESUSCITATE: Status: ACTIVE | Noted: 2025-03-20

## 2025-03-20 PROBLEM — E11.9 TYPE 2 DIABETES MELLITUS, WITHOUT LONG-TERM CURRENT USE OF INSULIN (HCC): Status: ACTIVE | Noted: 2025-03-20

## 2025-03-20 PROBLEM — I48.91 AF (ATRIAL FIBRILLATION) (HCC): Status: ACTIVE | Noted: 2025-03-20

## 2025-03-20 LAB
ALBUMIN SERPL BCP-MCNC: 3.3 G/DL (ref 3.2–4.9)
ALBUMIN/GLOB SERPL: 0.8 G/DL
ALP SERPL-CCNC: 117 U/L (ref 30–99)
ALT SERPL-CCNC: 11 U/L (ref 2–50)
AMPHET UR QL SCN: NEGATIVE
ANION GAP SERPL CALC-SCNC: 12 MMOL/L (ref 7–16)
APPEARANCE UR: CLEAR
AST SERPL-CCNC: 19 U/L (ref 12–45)
BACTERIA #/AREA URNS HPF: ABNORMAL /HPF
BARBITURATES UR QL SCN: NEGATIVE
BASOPHILS # BLD AUTO: 0.7 % (ref 0–1.8)
BASOPHILS # BLD: 0.05 K/UL (ref 0–0.12)
BENZODIAZ UR QL SCN: NEGATIVE
BILIRUB SERPL-MCNC: 0.3 MG/DL (ref 0.1–1.5)
BILIRUB UR QL STRIP.AUTO: NEGATIVE
BUN SERPL-MCNC: 33 MG/DL (ref 8–22)
BZE UR QL SCN: NEGATIVE
CALCIUM ALBUM COR SERPL-MCNC: 10 MG/DL (ref 8.5–10.5)
CALCIUM SERPL-MCNC: 9.4 MG/DL (ref 8.5–10.5)
CANNABINOIDS UR QL SCN: NEGATIVE
CASTS URNS QL MICRO: ABNORMAL /LPF (ref 0–2)
CHLORIDE SERPL-SCNC: 103 MMOL/L (ref 96–112)
CO2 SERPL-SCNC: 23 MMOL/L (ref 20–33)
COLOR UR: YELLOW
CREAT SERPL-MCNC: 2.23 MG/DL (ref 0.5–1.4)
EOSINOPHIL # BLD AUTO: 0.42 K/UL (ref 0–0.51)
EOSINOPHIL NFR BLD: 5.5 % (ref 0–6.9)
EPITHELIAL CELLS 1715: ABNORMAL /HPF (ref 0–5)
ERYTHROCYTE [DISTWIDTH] IN BLOOD BY AUTOMATED COUNT: 52.1 FL (ref 35.9–50)
ETHANOL BLD-MCNC: <10.1 MG/DL
FENTANYL UR QL: NEGATIVE
GFR SERPLBLD CREATININE-BSD FMLA CKD-EPI: 30 ML/MIN/1.73 M 2
GLOBULIN SER CALC-MCNC: 4.3 G/DL (ref 1.9–3.5)
GLUCOSE BLD STRIP.AUTO-MCNC: 96 MG/DL (ref 65–99)
GLUCOSE SERPL-MCNC: 101 MG/DL (ref 65–99)
GLUCOSE UR STRIP.AUTO-MCNC: NEGATIVE MG/DL
GRANULAR CASTS  1716G: PRESENT /LPF
HCT VFR BLD AUTO: 36.5 % (ref 42–52)
HGB BLD-MCNC: 11.2 G/DL (ref 14–18)
HYALINE CAST   1831: PRESENT /LPF
IMM GRANULOCYTES # BLD AUTO: 0.03 K/UL (ref 0–0.11)
IMM GRANULOCYTES NFR BLD AUTO: 0.4 % (ref 0–0.9)
KETONES UR STRIP.AUTO-MCNC: NEGATIVE MG/DL
LACTATE SERPL-SCNC: 1.4 MMOL/L (ref 0.5–2)
LEUKOCYTE ESTERASE UR QL STRIP.AUTO: ABNORMAL
LYMPHOCYTES # BLD AUTO: 2.56 K/UL (ref 1–4.8)
LYMPHOCYTES NFR BLD: 33.5 % (ref 22–41)
MCH RBC QN AUTO: 27.1 PG (ref 27–33)
MCHC RBC AUTO-ENTMCNC: 30.7 G/DL (ref 32.3–36.5)
MCV RBC AUTO: 88.4 FL (ref 81.4–97.8)
METHADONE UR QL SCN: NEGATIVE
MICRO URNS: ABNORMAL
MONOCYTES # BLD AUTO: 0.69 K/UL (ref 0–0.85)
MONOCYTES NFR BLD AUTO: 9 % (ref 0–13.4)
NEUTROPHILS # BLD AUTO: 3.9 K/UL (ref 1.82–7.42)
NEUTROPHILS NFR BLD: 50.9 % (ref 44–72)
NITRITE UR QL STRIP.AUTO: NEGATIVE
NRBC # BLD AUTO: 0 K/UL
NRBC BLD-RTO: 0 /100 WBC (ref 0–0.2)
OPIATES UR QL SCN: NEGATIVE
OXYCODONE UR QL SCN: POSITIVE
PCP UR QL SCN: NEGATIVE
PH UR STRIP.AUTO: 5 [PH] (ref 5–8)
PLATELET # BLD AUTO: 290 K/UL (ref 164–446)
PMV BLD AUTO: 10.5 FL (ref 9–12.9)
POTASSIUM SERPL-SCNC: 4 MMOL/L (ref 3.6–5.5)
PROPOXYPH UR QL SCN: NEGATIVE
PROT SERPL-MCNC: 7.6 G/DL (ref 6–8.2)
PROT UR QL STRIP: NEGATIVE MG/DL
RBC # BLD AUTO: 4.13 M/UL (ref 4.7–6.1)
RBC # URNS HPF: ABNORMAL /HPF (ref 0–2)
RBC UR QL AUTO: ABNORMAL
SODIUM SERPL-SCNC: 138 MMOL/L (ref 135–145)
SP GR UR STRIP.AUTO: 1.01
UROBILINOGEN UR STRIP.AUTO-MCNC: 0.2 EU/DL
WBC # BLD AUTO: 7.7 K/UL (ref 4.8–10.8)
WBC #/AREA URNS HPF: ABNORMAL /HPF

## 2025-03-20 PROCEDURE — 80307 DRUG TEST PRSMV CHEM ANLYZR: CPT

## 2025-03-20 PROCEDURE — 700111 HCHG RX REV CODE 636 W/ 250 OVERRIDE (IP): Mod: JZ | Performed by: EMERGENCY MEDICINE

## 2025-03-20 PROCEDURE — 96365 THER/PROPH/DIAG IV INF INIT: CPT

## 2025-03-20 PROCEDURE — 87086 URINE CULTURE/COLONY COUNT: CPT

## 2025-03-20 PROCEDURE — 80053 COMPREHEN METABOLIC PANEL: CPT

## 2025-03-20 PROCEDURE — A9270 NON-COVERED ITEM OR SERVICE: HCPCS | Performed by: HOSPITALIST

## 2025-03-20 PROCEDURE — 99223 1ST HOSP IP/OBS HIGH 75: CPT | Performed by: HOSPITALIST

## 2025-03-20 PROCEDURE — 700105 HCHG RX REV CODE 258: Performed by: HOSPITALIST

## 2025-03-20 PROCEDURE — 83605 ASSAY OF LACTIC ACID: CPT

## 2025-03-20 PROCEDURE — 82077 ASSAY SPEC XCP UR&BREATH IA: CPT

## 2025-03-20 PROCEDURE — 87040 BLOOD CULTURE FOR BACTERIA: CPT

## 2025-03-20 PROCEDURE — 87070 CULTURE OTHR SPECIMN AEROBIC: CPT

## 2025-03-20 PROCEDURE — 700105 HCHG RX REV CODE 258: Performed by: EMERGENCY MEDICINE

## 2025-03-20 PROCEDURE — 82962 GLUCOSE BLOOD TEST: CPT

## 2025-03-20 PROCEDURE — 85025 COMPLETE CBC W/AUTO DIFF WBC: CPT

## 2025-03-20 PROCEDURE — 99285 EMERGENCY DEPT VISIT HI MDM: CPT

## 2025-03-20 PROCEDURE — 87641 MR-STAPH DNA AMP PROBE: CPT

## 2025-03-20 PROCEDURE — 87147 CULTURE TYPE IMMUNOLOGIC: CPT

## 2025-03-20 PROCEDURE — 87186 SC STD MICRODIL/AGAR DIL: CPT

## 2025-03-20 PROCEDURE — 87077 CULTURE AEROBIC IDENTIFY: CPT | Mod: 91

## 2025-03-20 PROCEDURE — 36415 COLL VENOUS BLD VENIPUNCTURE: CPT

## 2025-03-20 PROCEDURE — 81001 URINALYSIS AUTO W/SCOPE: CPT

## 2025-03-20 PROCEDURE — 96375 TX/PRO/DX INJ NEW DRUG ADDON: CPT

## 2025-03-20 PROCEDURE — 71045 X-RAY EXAM CHEST 1 VIEW: CPT

## 2025-03-20 PROCEDURE — 700102 HCHG RX REV CODE 250 W/ 637 OVERRIDE(OP): Performed by: HOSPITALIST

## 2025-03-20 PROCEDURE — 87205 SMEAR GRAM STAIN: CPT

## 2025-03-20 PROCEDURE — 70450 CT HEAD/BRAIN W/O DYE: CPT

## 2025-03-20 PROCEDURE — 770006 HCHG ROOM/CARE - MED/SURG/GYN SEMI*

## 2025-03-20 RX ORDER — ACETAMINOPHEN 325 MG/1
650 TABLET ORAL EVERY 6 HOURS PRN
Status: DISCONTINUED | OUTPATIENT
Start: 2025-03-20 | End: 2025-03-24 | Stop reason: HOSPADM

## 2025-03-20 RX ORDER — ATORVASTATIN CALCIUM 20 MG/1
20 TABLET, FILM COATED ORAL EVERY EVENING
Status: DISCONTINUED | OUTPATIENT
Start: 2025-03-20 | End: 2025-03-24 | Stop reason: HOSPADM

## 2025-03-20 RX ORDER — CEFTRIAXONE 2 G/1
2000 INJECTION, POWDER, FOR SOLUTION INTRAMUSCULAR; INTRAVENOUS ONCE
Status: COMPLETED | OUTPATIENT
Start: 2025-03-20 | End: 2025-03-20

## 2025-03-20 RX ORDER — OXYCODONE HYDROCHLORIDE 5 MG/1
5 TABLET ORAL 3 TIMES DAILY
Status: ON HOLD | COMMUNITY
End: 2025-03-24

## 2025-03-20 RX ORDER — SODIUM CHLORIDE 9 MG/ML
30 INJECTION, SOLUTION INTRAVENOUS ONCE
Status: COMPLETED | OUTPATIENT
Start: 2025-03-20 | End: 2025-03-20

## 2025-03-20 RX ORDER — LINEZOLID 600 MG/1
600 TABLET, FILM COATED ORAL EVERY 12 HOURS
Status: DISCONTINUED | OUTPATIENT
Start: 2025-03-20 | End: 2025-03-24 | Stop reason: HOSPADM

## 2025-03-20 RX ORDER — SULFAMETHOXAZOLE AND TRIMETHOPRIM 800; 160 MG/1; MG/1
1 TABLET ORAL EVERY 12 HOURS
Status: ON HOLD | COMMUNITY
Start: 2025-03-18 | End: 2025-03-24

## 2025-03-20 RX ORDER — DULOXETIN HYDROCHLORIDE 30 MG/1
30 CAPSULE, DELAYED RELEASE ORAL DAILY
COMMUNITY

## 2025-03-20 RX ORDER — LINEZOLID 2 MG/ML
600 INJECTION, SOLUTION INTRAVENOUS ONCE
Status: COMPLETED | OUTPATIENT
Start: 2025-03-20 | End: 2025-03-20

## 2025-03-20 RX ORDER — ATORVASTATIN CALCIUM 20 MG/1
20 TABLET, FILM COATED ORAL EVERY EVENING
COMMUNITY

## 2025-03-20 RX ORDER — SODIUM CHLORIDE, SODIUM LACTATE, POTASSIUM CHLORIDE, CALCIUM CHLORIDE 600; 310; 30; 20 MG/100ML; MG/100ML; MG/100ML; MG/100ML
INJECTION, SOLUTION INTRAVENOUS CONTINUOUS
Status: DISCONTINUED | OUTPATIENT
Start: 2025-03-20 | End: 2025-03-24 | Stop reason: HOSPADM

## 2025-03-20 RX ORDER — VENLAFAXINE HYDROCHLORIDE 37.5 MG/1
37.5 CAPSULE, EXTENDED RELEASE ORAL
Status: DISCONTINUED | OUTPATIENT
Start: 2025-03-22 | End: 2025-03-23

## 2025-03-20 RX ADMIN — LINEZOLID 600 MG: 600 TABLET, FILM COATED ORAL at 21:13

## 2025-03-20 RX ADMIN — SODIUM CHLORIDE 2859 ML: 9 INJECTION, SOLUTION INTRAVENOUS at 10:40

## 2025-03-20 RX ADMIN — SODIUM CHLORIDE, POTASSIUM CHLORIDE, SODIUM LACTATE AND CALCIUM CHLORIDE: 600; 310; 30; 20 INJECTION, SOLUTION INTRAVENOUS at 22:46

## 2025-03-20 RX ADMIN — ATORVASTATIN CALCIUM 20 MG: 20 TABLET, FILM COATED ORAL at 17:10

## 2025-03-20 RX ADMIN — CEFTRIAXONE SODIUM 2000 MG: 2 INJECTION, POWDER, FOR SOLUTION INTRAMUSCULAR; INTRAVENOUS at 11:32

## 2025-03-20 RX ADMIN — SODIUM CHLORIDE, POTASSIUM CHLORIDE, SODIUM LACTATE AND CALCIUM CHLORIDE: 600; 310; 30; 20 INJECTION, SOLUTION INTRAVENOUS at 14:49

## 2025-03-20 RX ADMIN — LINEZOLID 600 MG: 600 INJECTION, SOLUTION INTRAVENOUS at 13:06

## 2025-03-20 ASSESSMENT — COGNITIVE AND FUNCTIONAL STATUS - GENERAL
DRESSING REGULAR UPPER BODY CLOTHING: TOTAL
MOVING TO AND FROM BED TO CHAIR: TOTAL
DAILY ACTIVITIY SCORE: 8
EATING MEALS: A LITTLE
MOVING FROM LYING ON BACK TO SITTING ON SIDE OF FLAT BED: TOTAL
PERSONAL GROOMING: TOTAL
MOBILITY SCORE: 6
WALKING IN HOSPITAL ROOM: TOTAL
TOILETING: TOTAL
CLIMB 3 TO 5 STEPS WITH RAILING: TOTAL
DRESSING REGULAR LOWER BODY CLOTHING: TOTAL
SUGGESTED CMS G CODE MODIFIER MOBILITY: CN
TURNING FROM BACK TO SIDE WHILE IN FLAT BAD: TOTAL
SUGGESTED CMS G CODE MODIFIER DAILY ACTIVITY: CM
STANDING UP FROM CHAIR USING ARMS: TOTAL
HELP NEEDED FOR BATHING: TOTAL

## 2025-03-20 ASSESSMENT — CHA2DS2 SCORE
PRIOR STROKE OR TIA OR THROMBOEMBOLISM: NO
CHF OR LEFT VENTRICULAR DYSFUNCTION: NO
VASCULAR DISEASE: YES
AGE 65 TO 74: YES
HYPERTENSION: YES
DIABETES: YES
SEX: MALE
AGE 75 OR GREATER: NO
CHA2DS2 VASC SCORE: 4

## 2025-03-20 ASSESSMENT — FIBROSIS 4 INDEX: FIB4 SCORE: 2.84

## 2025-03-20 ASSESSMENT — PAIN DESCRIPTION - PAIN TYPE
TYPE: ACUTE PAIN
TYPE: ACUTE PAIN

## 2025-03-20 NOTE — ASSESSMENT & PLAN NOTE
Left lower extremity cellulitis with extensive skin breakdown and necrosis  Wound cultures positive for E coli, Klebsiella, and MRSA  Continue ceftriaxone and linezolid per susceptibilities to complete 5-day course  Wound care consulted

## 2025-03-20 NOTE — ED TRIAGE NOTES
"Chief Complaint   Patient presents with    ALOC     Pt w ALOC for 48 hrs per staff at [facility]. Pt w hx of cystitis and cellulitis to RLE       Pt BIBA from SNF with the above complaint. Pt is GCS 14, ano 2x, FSBG 140.       /62   Pulse 83   Temp 36.7 °C (98.1 °F) (Axillary)   Resp 18   Ht 1.778 m (5' 10\")   Wt 95.3 kg (210 lb)   SpO2 95%   BMI 30.13 kg/m²     "

## 2025-03-20 NOTE — ASSESSMENT & PLAN NOTE
He is on metformin and sitagliptin as an outpatient  Recent hemoglobin A1c 3 weeks ago was 9  Metformin will be held due to renal impairment  Continue duloxetine for neuropathy  Continue atorvastatin

## 2025-03-20 NOTE — PROGRESS NOTES
Pt unable to wake to voice, pt only woke momentarily when sternal rubbed. Charge RN at bedside, pt slowly became to become more responsive, a&o x2.     Unable to complete admit profile at this time, pt unable to stay awake to answer questions fully and accurately.

## 2025-03-20 NOTE — ED NOTES
Phone report to Lucina MCKEON. Patient transport taking patient to [facility] with chart and all belongings. Patient aware of POC. Care transferred.

## 2025-03-20 NOTE — PROGRESS NOTES
4 Eyes Skin Assessment Completed by MIKE Verdugo and MIKE Swann.    Head flaky  Ears flaky  Nose flaky  Mouth flaky  Neck WDL  Breast/Chest WDL  Shoulder Blades WDL  Spine WDL  (R) Arm/Elbow/Hand Redness and Blanching  (L) Arm/Elbow/Hand Redness and Blanching  Abdomen ostomy in place, MASD and open to pannus  Groin redness, rash  Scrotum/Coccyx/Buttocks Redness, Excoriation, and Moisture Fissure  (R) Leg Swelling, wound to calf  (L) Leg Swelling, Discoloration, wound to calf  (R) Heel/Foot/Toe Redness and Blanching, 2nd toe amputation, callus/ulcer to bottom of foot  (L) Heel/Foot/Toe Redness and Blanching, 2nd toe amputation, scab/wound to medial ankle          Devices In Places IV,       Interventions In Place Heel Mepilex, Heel Float Boots, TAP System, Pillows, Q2 Turns, Low Air Loss Mattress, and Barrier Cream    Possible Skin Injury Yes    Pictures Uploaded Into Epic Yes  Wound Consult Placed Yes  RN Wound Prevention Protocol Ordered No

## 2025-03-20 NOTE — ED NOTES
Pharmacy Medication Reconciliation    ~Med rec updated and complete per MAR- [facility]  ~Allergies have been verified and updated per MAR  ~Per MAR patient is on a 10 day course of Bactrim DS    ~Anticoagulants (rivaroxaban, apixaban, edoxaban, dabigatran, warfarin, enoxaparin) taken in the last 14 days? Yes   ~Anticoagulant: Eliquis, Last dose: 3/19/2025

## 2025-03-20 NOTE — ASSESSMENT & PLAN NOTE
Creatinine 3 weeks ago was 1.2 currently up to 2.23  UA positive for granula casts consistent with ATN from sepsis  Repeat AM BMP

## 2025-03-20 NOTE — ASSESSMENT & PLAN NOTE
On his last admission he elected for DNR/DNI status.  He is currently not able to make his own decisions therefore we will abide by his previous wishes

## 2025-03-20 NOTE — ED NOTES
Bedside report received from MIKE Valenzuela. Assumed care of patient. Pt assessed, AAO x 2. Patient's concerns addressed. Patient aware of POC. Fall precautions in place.  Bed alarm in use. Call light within reach. Phlebotomy at bedside to draw blood cultures and lactic.

## 2025-03-20 NOTE — ED PROVIDER NOTES
ED Provider Note    Scribed for Gris Chester M.D. by Honey Elkins. 3/20/2025, 9:43 AM.    Primary care provider: Pcp Not In Computer  Means of arrival: EMS  History obtained from: Patient and nurse note  History limited by: N/A    CHIEF COMPLAINT  Chief Complaint   Patient presents with    ALOC     Pt w ALOC for 48 hrs per staff at [facility]. Pt w hx of cystitis and cellulitis to RLE     HPI/ROS  Sebastian Sinha is a 74 y.o. male who presents to the Emergency Department via EMS from [facility] for altered mental status onset two days ago. Patient has a history of recent urinary tract infection and left lower leg wound for which she was treated in the hospital a month ago.  He completed his course of ciprofloxacin at outside facility.  He notes that he does not feel confused, feels like himself.  He does note that he has felt unwell over the last 2 days and is having some dysuria.  He does have an ongoing wound in his left lower extremity as well, he is uncertain if this is improving or not.  He reports difficulty moving his lower extremities which is baseline for him. No additional pain or symptoms noted at this time.    EXTERNAL RECORDS REVIEWED  Patient was hospitalized 2/22/25 for altered mentation. History of hypertension, atrial fibrillation on Eliquis, rectal cancer with lung mets, status post chemo and radiation. He was found to have sepsis secondary to urinary tract infection caused by E. Coli, he was also noted to have a leg wound. He was discharged with ciprofloxacin.  Wound culture was negative.     LIMITATION TO HISTORY   None    OUTSIDE HISTORIAN(S):  Nurse report to confirm sequence of events and collateral information provided. See HPI above.    PAST MEDICAL HISTORY   has a past medical history of A-fib (HCC), Arthritis, BPH (benign prostatic hyperplasia), Cellulitis, Diabetes (HCC), Kidney disease, Rectal cancer (HCC), and Sleep apnea.    SURGICAL HISTORY   has a past surgical history that includes  "other abdominal surgery; other; low anterior resection laparoscopic (03/27/2015); colonoscopy; colonoscopy (01/22/2018); cataract extraction with iol (Bilateral, 2020); and toe amputation (Bilateral).    SOCIAL HISTORY  Social History     Tobacco Use    Smoking status: Former     Types: Cigarettes    Smokeless tobacco: Never    Tobacco comments:     \"Rare use 50 years ago, total 3 weeks\"   Vaping Use    Vaping status: Never Used   Substance Use Topics    Alcohol use: No    Drug use: No      FAMILY HISTORY  Family History   Problem Relation Age of Onset    Cancer Mother         colon cancer    COPD Father      CURRENT MEDICATIONS  Home Medications       Reviewed by Aury Claudio (Pharmacy Tech) on 03/20/25 at 1307  Med List Status: Complete     Medication Last Dose Status   acetaminophen (TYLENOL) 500 MG Tab Unknown Active   apixaban (ELIQUIS) 5mg Tab 3/19/2025 Active   atorvastatin (LIPITOR) 20 MG Tab 3/19/2025 Active   baclofen (LIORESAL) 5 MG Tab 3/19/2025 Active   cyanocobalamin (VITAMIN B-12) 500 MCG Tab 3/19/2025 Active   diclofenac sodium (VOLTAREN) 1 % Gel Unknown Active   DULoxetine (CYMBALTA) 30 MG Cap DR Particles 3/19/2025 Active   lidocaine (ASPERFLEX) 4 % Patch 3/12/2025 Active   metformin (GLUCOPHAGE) 1000 MG tablet 3/19/2025 Active   miconazole (MICOTIN) 2 % powder 3/13/2025 Active   oxyCODONE immediate-release (ROXICODONE) 5 MG Tab 3/19/2025 Active   Pollen Extracts (PROSTAT PO) 3/19/2025 Active   pregabalin (LYRICA) 150 MG Cap 3/19/2025 Active   SITagliptin (ZITUVIO) 100 MG Tab 3/19/2025 Active   sulfamethoxazole-trimethoprim (BACTRIM DS) 800-160 MG tablet 3/19/2025 Active   tamsulosin (FLOMAX) 0.4 MG capsule 3/19/2025 Active   triamcinolone acetonide (KENALOG) 0.1 % Cream 3/13/2025 Active   venlafaxine XR (EFFEXOR XR) 37.5 MG CAPSULE SR 24 HR 3/18/2025 Active                  Audit from Redirected Encounters    **Home medications have not yet been reviewed for this encounter**   " "    ALLERGIES  Allergies   Allergen Reactions    Gabapentin Unspecified     \"Dizziness\"  On MAR from pharmacy      PHYSICAL EXAM  VITAL SIGNS: /62   Pulse 83   Temp 36.7 °C (98.1 °F) (Axillary)   Resp 18   Ht 1.778 m (5' 10\")   Wt 95.3 kg (210 lb)   SpO2 95%   BMI 30.13 kg/m²   Nursing note and vitals reviewed.  Constitutional: Well-developed and well-nourished. No acute distress.   HENT: Head is normocephalic and atraumatic.  Eyes: extra-ocular movements intact  Cardiovascular: Regular rate and regular rhythm. No murmur heard.  Pulmonary/Chest: Breath sounds normal. No wheezes or rales.   Abdominal: Soft and non-tender. No distention.  No palpable masses  Musculoskeletal: Extremities exhibit normal range of motion without edema or tenderness.   Neurological: Awake and alert  Skin: Skin is notable for left lower extremity wound that is bleeding with surrounding erythema.  No purulence        DIAGNOSTIC STUDIES:  LABS  Labs Reviewed   CBC WITH DIFFERENTIAL - Abnormal; Notable for the following components:       Result Value    RBC 4.13 (*)     Hemoglobin 11.2 (*)     Hematocrit 36.5 (*)     MCHC 30.7 (*)     RDW 52.1 (*)     All other components within normal limits   COMP METABOLIC PANEL - Abnormal; Notable for the following components:    Glucose 101 (*)     Bun 33 (*)     Creatinine 2.23 (*)     Alkaline Phosphatase 117 (*)     Globulin 4.3 (*)     All other components within normal limits   URINALYSIS - Abnormal; Notable for the following components:    Leukocyte Esterase Trace (*)     Occult Blood Large (*)     All other components within normal limits   URINE DRUG SCREEN - Abnormal; Notable for the following components:    Oxycodone Positive (*)     All other components within normal limits   URINE MICROSCOPIC (W/UA) - Abnormal; Notable for the following components:    WBC 6-10 (*)     RBC 6-10 (*)     Bacteria Few (*)     Urine Casts 3-5 (*)     Granular Casts Present (*)     All other components " within normal limits   ESTIMATED GFR - Abnormal; Notable for the following components:    GFR (CKD-EPI) 30 (*)     All other components within normal limits   DIAGNOSTIC ALCOHOL   LACTIC ACID   BLOOD CULTURE   URINE CULTURE(NEW)   BLOOD CULTURE   All labs reviewed and independently interpreted by myself    RADIOLOGY  Images independently interpreted by myself prior to radiologist review:  -CT of the head demonstrates no acute intracranial hemorrhage    Final interpretation by radiology demonstrates:  CT-HEAD W/O   Final Result      1.  No evidence of acute territorial infarct, intracranial hemorrhage or mass lesion.   2.  Mild diffuse cerebral substance loss.   3.  Mild microangiopathic ischemic change versus demyelination or gliosis.               DX-CHEST-PORTABLE (1 VIEW)   Final Result      No acute cardiopulmonary disease evident.      The radiologist's interpretation of all radiological studies have been reviewed by me.    COURSE & MEDICAL DECISION MAKING    INITIAL ASSESSMENT, ED COURSE AND PLAN    Patient is a 74-year-old male who presents for evaluation of altered mentation.  On exam patient is alert and oriented and does not appear to be c overtly onfused.  He notes he feels generally weak.  As the facility reported he was confused will obtain a CT of the head to ascertain intracranial abnormality.  Differential diagnosis includes infection, electrolyte derangement, dehydration.  Diagnostic workup includes labs and urinalysis.    Patient's initial vitals are within normal limits.  On exam patient is noted to have significant wound to left lower extremity with surrounding erythema.  I suspect ongoing wound infection and patient will be started on Rocephin and vancomycin.  Urinalysis was a straight cath specimen and returns and is consistent with infection, this is being covered by the Rocephin.  Labs are also notable for acute kidney injury with a creatinine of 2.23, will treat with IV fluid resuscitation.   Labs otherwise unremarkable.  CT of the head returns and demonstrates no acute intracranial hemorrhage.  At this time I believe patient requires hospitalization for acute kidney injury, altered mentation, urinary tract infection and wound infection.  Case discussed with Dr. Allen, patient hospitalized in guarded condition.       REASSESSMENTS     HYDRATION: Based on the patient's presentation of Acute Kidney Injury the patient was given IV fluids. IV Hydration was used because oral hydration was not adequate alone. Upon recheck following hydration, the patient was improved.         DISPOSITION AND DISCUSSIONS  I have discussed management of the patient with the following physicians and JONATHAN's:  see above    Discussion of management with other QHP or appropriate source(s): None     Escalation of care considered, and ultimately not performed:see above    Barriers to care at this time, including but not limited to:  None .     Decision tools and prescription drugs considered including, but not limited to: see above.    DISPOSITION:  Patient will be hospitalized by Dr. Herrmann in guarded condition.    FINAL IMPRESSION  1. Altered mental status, unspecified altered mental status type    2. ARMANI (acute kidney injury) (HCC)    3. Urinary tract infection without hematuria, site unspecified    4. Left leg cellulitis          Honey DEJESUS ([place]), am scribing for, and in the presence of, Gris Chester M.D..    Electronically signed by: Honey Elkins ([place]), 3/20/2025    IGris M.D. personally performed the services described in this documentation, as scribed by Honey Elkins in my presence, and it is both accurate and complete.    The note accurately reflects work and decisions made by me.  Gris Chester M.D.  3/20/2025  1:10 PM

## 2025-03-20 NOTE — CARE PLAN
The patient is Watcher - Medium risk of patient condition declining or worsening    Shift Goals  Clinical Goals: IV antibiotics, wound care  Patient Goals: stephanie  Family Goals: stephanie    Progress made toward(s) clinical / shift goals:  o2 sats maintained on room air.  on. No reports of pain. Bed alarm on, NERI in place. Q2hr turns. Heel float boots on, heel mepilex in place, wound to left shin redressed.    Patient is not progressing towards the following goals:      Problem: Knowledge Deficit - Standard  Goal: Patient and family/care givers will demonstrate understanding of plan of care, disease process/condition, diagnostic tests and medications  Outcome: Not Progressing  Pt is a&o x2, difficult to rouse. No learning evidenced.     Problem: Skin Integrity  Goal: Skin integrity is maintained or improved  Outcome: Not Progressing   Wounds to bilateral calves, medial ankle, sacrum. Wound consulted, offloading and wound care where applicable.

## 2025-03-20 NOTE — H&P
Hospital Medicine History & Physical Note    Date of Service  3/20/2025    Primary Care Physician  Pcp Not In Computer    Consultants  none    Code Status  Prior    Chief Complaint  Chief Complaint   Patient presents with    ALOC     Pt w ALOC for 48 hrs per staff at [facility]. Pt w hx of cystitis and cellulitis to RLE       History of Presenting Illness  Sebastian Sinha is a 74 y.o. male who presented 3/20/2025 with altered mental status.  Mr. Sinha has a past medical history of atrial fibrillation on Eliquis therapy, rectal cancer with metastasis to lung status post chemotherapy and radiation, diabetes mellitus that was most recent admitted to this hospital from February 22 through fibber 26th with UTI and had urine and blood cultures positive for E. coli.  He was transferred to [facility] nursing [facility] after being treated with IV antibiotics and was transition to ciprofloxacin.  During the hospitalization he had a wound to the left leg and had wound care.  Today he was brought in by paramedics from [facility] due to altered mental status.  In the emergency room he has normal white count though elevated creatinine of 2.23 (creatinine was 1.21 on February 26) and has a significant cellulitis with skin breakdown of the left lower extremity.  He will be treated with IV fluids, IV antibiotics, aggressive wound care given the severity of the cellulitis and degree of skin breakdown with lower extremity.    I discussed the plan of care with Dr. Chester .    Review of Systems  Review of Systems   Unable to perform ROS: Mental acuity       Past Medical History   has a past medical history of A-fib (HCC), Arthritis, BPH (benign prostatic hyperplasia), Cellulitis, Diabetes (HCC), Kidney disease, Rectal cancer (HCC), and Sleep apnea.    Surgical History   has a past surgical history that includes other abdominal surgery; other; low anterior resection laparoscopic (03/27/2015); colonoscopy; colonoscopy  "(01/22/2018); cataract extraction with iol (Bilateral, 2020); and toe amputation (Bilateral).     Family History  family history includes COPD in his father; Cancer in his mother.   Family history reviewed with patient. There is no family history that is pertinent to the chief complaint.     Social History   reports that he has quit smoking. His smoking use included cigarettes. He has never used smokeless tobacco. He reports that he does not drink alcohol and does not use drugs.    Allergies  Allergies   Allergen Reactions    Gabapentin Unspecified     \"Dizziness\"  On MAR from pharmacy        Medications  Prior to Admission Medications   Prescriptions Last Dose Informant Patient Reported? Taking?   DULoxetine (CYMBALTA) 30 MG Cap DR Particles 3/19/2025 at  8:00 AM MAR from Other Facility Yes Yes   Sig: Take 30 mg by mouth every day.   Pollen Extracts (PROSTAT PO) 3/19/2025 Bedtime MAR from Other Facility Yes Yes   Sig: Take 1 Each by mouth 2 times a day.   SITagliptin (ZITUVIO) 100 MG Tab 3/19/2025 Morning MAR from Other Facility Yes Yes   Sig: Take 100 mg by mouth every day.   acetaminophen (TYLENOL) 500 MG Tab Unknown MAR from Other Facility Yes No   Sig: Take 1,000 mg by mouth every 8 hours as needed for Mild Pain. 2 tablets = 1,000 mg.   apixaban (ELIQUIS) 5mg Tab 3/19/2025 Bedtime MAR from Other Facility Yes Yes   Sig: Take 5 mg by mouth 2 times a day.   atorvastatin (LIPITOR) 20 MG Tab 3/19/2025 Bedtime MAR from Other Facility Yes Yes   Sig: Take 20 mg by mouth every evening.   baclofen (LIORESAL) 5 MG Tab 3/19/2025 Bedtime MAR from Other Facility Yes Yes   Sig: Take 2.5 mg by mouth 3 times a day as needed (Muscle Spasms). 1/2 tablet = 2.5 mg.   cyanocobalamin (VITAMIN B-12) 500 MCG Tab 3/19/2025 Morning MAR from Other Facility Yes Yes   Sig: Take 1,000 mcg by mouth every day.   diclofenac sodium (VOLTAREN) 1 % Gel Unknown MAR from Other Facility Yes No   Sig: Apply 2 g topically 4 times a day as needed (Joint " Pain).   lidocaine (ASPERFLEX) 4 % Patch 3/12/2025 at  9:00 AM MAR from Other Facility Yes No   Sig: Place 1 Patch on the skin every 24 hours. 12 hours on, 12 hours off.   metformin (GLUCOPHAGE) 1000 MG tablet 3/19/2025 at  5:00 PM MAR from Other Facility Yes Yes   Sig: Take 1,000 mg by mouth 2 times a day with meals.   miconazole (MICOTIN) 2 % powder 3/13/2025 Morning MAR from Other Facility Yes No   Sig: Apply 1 Application topically 2 times a day. Apply to groin.   oxyCODONE immediate-release (ROXICODONE) 5 MG Tab 3/19/2025 at  4:30 PM MAR from Other Facility Yes Yes   Sig: Take 5 mg by mouth 3 times a day.   pregabalin (LYRICA) 150 MG Cap 3/19/2025 Bedtime MAR from Other Facility No Yes   Sig: Take 1 Capsule by mouth 2 times a day for 30 days.   sulfamethoxazole-trimethoprim (BACTRIM DS) 800-160 MG tablet 3/19/2025 at  9:00 PM MAR from Other Facility Yes Yes   Sig: Take 1 Tablet by mouth every 12 hours. 10 days   tamsulosin (FLOMAX) 0.4 MG capsule 3/19/2025 at  9:00 AM MAR from Other Facility Yes Yes   Sig: Take 0.4 mg by mouth every day. Take 30 minutes after the same meal each day.   triamcinolone acetonide (KENALOG) 0.1 % Cream 3/13/2025 Morning MAR from Other Facility Yes No   Sig: Apply 1 Application topically every day. Use as directed for wound care.   venlafaxine XR (EFFEXOR XR) 37.5 MG CAPSULE SR 24 HR 3/18/2025 Morning MAR from Other Facility Yes No   Sig: Take 37.5 mg by mouth two times a week. Tuesday & Saturday      Facility-Administered Medications: None       Physical Exam  Temp:  [36.7 °C (98.1 °F)] 36.7 °C (98.1 °F)  Pulse:  [74-83] 80  Resp:  [12-20] 12  BP: (104-120)/(60-69) 120/63  SpO2:  [91 %-96 %] 96 %  Blood Pressure : 120/63   Temperature: 36.7 °C (98.1 °F)   Pulse: 80   Respiration: 12   Pulse Oximetry: 96 %       Physical Exam  Vitals and nursing note reviewed.   Constitutional:       Appearance: He is ill-appearing.   Cardiovascular:      Rate and Rhythm: Normal rate and regular  "rhythm.   Pulmonary:      Effort: Pulmonary effort is normal.      Breath sounds: Normal breath sounds.   Abdominal:      General: There is distension.   Neurological:      Comments: Somnolent  He is a modest, not excellent historian         Laboratory:  Recent Labs     03/20/25  0950   WBC 7.7   RBC 4.13*   HEMOGLOBIN 11.2*   HEMATOCRIT 36.5*   MCV 88.4   MCH 27.1   MCHC 30.7*   RDW 52.1*   PLATELETCT 290   MPV 10.5     Recent Labs     03/20/25  0950   SODIUM 138   POTASSIUM 4.0   CHLORIDE 103   CO2 23   GLUCOSE 101*   BUN 33*   CREATININE 2.23*   CALCIUM 9.4     Recent Labs     03/20/25  0950   ALTSGPT 11   ASTSGOT 19   ALKPHOSPHAT 117*   TBILIRUBIN 0.3   GLUCOSE 101*         No results for input(s): \"NTPROBNP\" in the last 72 hours.      No results for input(s): \"TROPONINT\" in the last 72 hours.    Imaging:  CT-HEAD W/O   Final Result      1.  No evidence of acute territorial infarct, intracranial hemorrhage or mass lesion.   2.  Mild diffuse cerebral substance loss.   3.  Mild microangiopathic ischemic change versus demyelination or gliosis.               DX-CHEST-PORTABLE (1 VIEW)   Final Result      No acute cardiopulmonary disease evident.              Assessment/Plan:  Justification for Admission Status  I anticipate this patient will require at least two midnights for appropriate medical management, necessitating inpatient admission because IV fluids, IV antibiotics, wound care and possible surgery consultation due to lower extremity cellulitis with skin breakdown and necrosis    Patient will need a Med/Surg bed on MEDICAL service .  The need is secondary to as above.    * Cellulitis of left lower extremity- (present on admission)  Assessment & Plan  Very significant left lower extremity cellulitis with extensive skin breakdown and necrosis  Antibiotics will be given  Wound care consult  He may require surgical debridement based on his response to fluids, antibiotics, wound care    ARMANI (acute kidney injury) " (HCC)- (present on admission)  Assessment & Plan  Creatinine 3 weeks ago was 1.2 currently up to 2.23  Likely due to dehydration in the setting of infection  IV fluids will be given  Follow urine output  Basic metabolic panel ordered for the morning    Acute metabolic encephalopathy- (present on admission)  Assessment & Plan  Secondary to infection  Antibiotics, fluids intravenously, supportive care    Do not resuscitate- (present on admission)  Assessment & Plan  On his last admission he elected for DNR/DNI status.  He is currently not able to make his own decisions therefore we will abide by his previous wishes    Type 2 diabetes mellitus, without long-term current use of insulin (HCC)- (present on admission)  Assessment & Plan  He is on metformin and sitagliptin as an outpatient  Recent hemoglobin A1c 3 weeks ago was 9  He is currently too encephalopathic to tolerate a diet but once he is then consider starting sliding scale  Metformin will be held due to the elevated creatinine    AF (atrial fibrillation) (HCC)- (present on admission)  Assessment & Plan  Continue anticoagulation    Primary hypertension- (present on admission)  Assessment & Plan  Hold on blood pressure medications given risk of sepsis    Metastatic lung cancer (metastasis from lung to other site), right (HCC)- (present on admission)  Assessment & Plan  History of        VTE prophylaxis: therapeutic anticoagulation with Eliquis

## 2025-03-21 LAB
ANION GAP SERPL CALC-SCNC: 15 MMOL/L (ref 7–16)
BACTERIA UR CULT: NORMAL
BUN SERPL-MCNC: 24 MG/DL (ref 8–22)
CALCIUM SERPL-MCNC: 9.1 MG/DL (ref 8.5–10.5)
CHLORIDE SERPL-SCNC: 108 MMOL/L (ref 96–112)
CO2 SERPL-SCNC: 19 MMOL/L (ref 20–33)
CREAT SERPL-MCNC: 1.83 MG/DL (ref 0.5–1.4)
ERYTHROCYTE [DISTWIDTH] IN BLOOD BY AUTOMATED COUNT: 50.4 FL (ref 35.9–50)
GFR SERPLBLD CREATININE-BSD FMLA CKD-EPI: 39 ML/MIN/1.73 M 2
GLUCOSE SERPL-MCNC: 100 MG/DL (ref 65–99)
GRAM STN SPEC: NORMAL
HCT VFR BLD AUTO: 38 % (ref 42–52)
HGB BLD-MCNC: 12 G/DL (ref 14–18)
MCH RBC QN AUTO: 27.5 PG (ref 27–33)
MCHC RBC AUTO-ENTMCNC: 31.6 G/DL (ref 32.3–36.5)
MCV RBC AUTO: 87.2 FL (ref 81.4–97.8)
PLATELET # BLD AUTO: 236 K/UL (ref 164–446)
PMV BLD AUTO: 10.5 FL (ref 9–12.9)
POTASSIUM SERPL-SCNC: 4.1 MMOL/L (ref 3.6–5.5)
RBC # BLD AUTO: 4.36 M/UL (ref 4.7–6.1)
SCCMEC + MECA PNL NOSE NAA+PROBE: POSITIVE
SIGNIFICANT IND 70042: NORMAL
SIGNIFICANT IND 70042: NORMAL
SITE SITE: NORMAL
SITE SITE: NORMAL
SODIUM SERPL-SCNC: 142 MMOL/L (ref 135–145)
SOURCE SOURCE: NORMAL
SOURCE SOURCE: NORMAL
WBC # BLD AUTO: 5.2 K/UL (ref 4.8–10.8)

## 2025-03-21 PROCEDURE — 99233 SBSQ HOSP IP/OBS HIGH 50: CPT | Performed by: INTERNAL MEDICINE

## 2025-03-21 PROCEDURE — 700105 HCHG RX REV CODE 258: Performed by: HOSPITALIST

## 2025-03-21 PROCEDURE — 700102 HCHG RX REV CODE 250 W/ 637 OVERRIDE(OP): Performed by: INTERNAL MEDICINE

## 2025-03-21 PROCEDURE — 36415 COLL VENOUS BLD VENIPUNCTURE: CPT

## 2025-03-21 PROCEDURE — 80048 BASIC METABOLIC PNL TOTAL CA: CPT

## 2025-03-21 PROCEDURE — 700111 HCHG RX REV CODE 636 W/ 250 OVERRIDE (IP): Performed by: INTERNAL MEDICINE

## 2025-03-21 PROCEDURE — A9270 NON-COVERED ITEM OR SERVICE: HCPCS | Performed by: HOSPITALIST

## 2025-03-21 PROCEDURE — 700105 HCHG RX REV CODE 258: Performed by: INTERNAL MEDICINE

## 2025-03-21 PROCEDURE — 770006 HCHG ROOM/CARE - MED/SURG/GYN SEMI*

## 2025-03-21 PROCEDURE — 700102 HCHG RX REV CODE 250 W/ 637 OVERRIDE(OP): Performed by: HOSPITALIST

## 2025-03-21 PROCEDURE — 85027 COMPLETE CBC AUTOMATED: CPT

## 2025-03-21 PROCEDURE — A9270 NON-COVERED ITEM OR SERVICE: HCPCS | Performed by: INTERNAL MEDICINE

## 2025-03-21 RX ORDER — GAUZE BANDAGE 2" X 2"
100 BANDAGE TOPICAL DAILY
Status: DISCONTINUED | OUTPATIENT
Start: 2025-03-21 | End: 2025-03-24 | Stop reason: HOSPADM

## 2025-03-21 RX ORDER — SODIUM BICARBONATE 650 MG/1
650 TABLET ORAL 2 TIMES DAILY WITH MEALS
Status: DISCONTINUED | OUTPATIENT
Start: 2025-03-21 | End: 2025-03-24 | Stop reason: HOSPADM

## 2025-03-21 RX ADMIN — LINEZOLID 600 MG: 600 TABLET, FILM COATED ORAL at 09:03

## 2025-03-21 RX ADMIN — SODIUM CHLORIDE, POTASSIUM CHLORIDE, SODIUM LACTATE AND CALCIUM CHLORIDE: 600; 310; 30; 20 INJECTION, SOLUTION INTRAVENOUS at 06:49

## 2025-03-21 RX ADMIN — Medication 100 MG: at 12:44

## 2025-03-21 RX ADMIN — SODIUM BICARBONATE 650 MG: 650 TABLET ORAL at 18:01

## 2025-03-21 RX ADMIN — CEFTRIAXONE SODIUM 2000 MG: 10 INJECTION, POWDER, FOR SOLUTION INTRAVENOUS at 18:01

## 2025-03-21 RX ADMIN — SODIUM CHLORIDE, POTASSIUM CHLORIDE, SODIUM LACTATE AND CALCIUM CHLORIDE: 600; 310; 30; 20 INJECTION, SOLUTION INTRAVENOUS at 15:14

## 2025-03-21 RX ADMIN — APIXABAN 5 MG: 5 TABLET, FILM COATED ORAL at 18:00

## 2025-03-21 RX ADMIN — LINEZOLID 600 MG: 600 TABLET, FILM COATED ORAL at 20:35

## 2025-03-21 RX ADMIN — THERA TABS 1 TABLET: TAB at 12:44

## 2025-03-21 RX ADMIN — ATORVASTATIN CALCIUM 20 MG: 20 TABLET, FILM COATED ORAL at 18:00

## 2025-03-21 RX ADMIN — SODIUM CHLORIDE, POTASSIUM CHLORIDE, SODIUM LACTATE AND CALCIUM CHLORIDE: 600; 310; 30; 20 INJECTION, SOLUTION INTRAVENOUS at 23:18

## 2025-03-21 ASSESSMENT — PAIN DESCRIPTION - PAIN TYPE
TYPE: ACUTE PAIN

## 2025-03-21 NOTE — DISCHARGE PLANNING
Case Management Discharge Planning    Admission Date: 3/20/2025  GMLOS: 4.6  ALOS: 1    6-Clicks ADL Score: 8  6-Clicks Mobility Score: 6  PT and/or OT Eval ordered: Yes  Post-acute Referrals Ordered: Yes  Post-acute Choice Obtained: Yes  Has referral(s) been sent to post-acute provider:  Yes      Anticipated Discharge Dispo:      DME Needed: No    Action(s) Taken: OTHER    Discussed patient's case with MD during IDT Unit Rounds. Per MD, patient is not medically clear.    Per MD, the anticipated discharge disposition is transfer back to [facility].    SW requested DPA submits referral to [facility].     PASRR File # [id number]     Escalations Completed: None    Medically Clear: No    Next Steps: SNF    Barriers to Discharge: Medical clearance    Is the patient up for discharge tomorrow:

## 2025-03-21 NOTE — CARE PLAN
The patient is Stable - Low risk of patient condition declining or worsening    Shift Goals  Clinical Goals: Patient will tolerate antibiotics during shift, Patient will maintain skin integrity throughout shift  Patient Goals: Patient will have decreased pain with movement  Family Goals: DIANNA    Progress made toward(s) clinical / shift goals:  Patient's neurologic status remained A&Ox2, disoriented to time and situation. Patient tolerated antibiotic administration and patient tolerated Q2 hour turns throughout night.  Problem: Hemodynamics  Goal: Patient's hemodynamics, fluid balance and neurologic status will be stable or improve  Outcome: Progressing     Problem: Skin Integrity  Goal: Skin integrity is maintained or improved  Outcome: Progressing       Patient is not progressing towards the following goals:

## 2025-03-21 NOTE — PROGRESS NOTES
Report received from NOC RN and assumed patient care at 0700. Patient is A&Ox 2, on RA, and is awake & eating breakfast. Patient is disoriented to time and situation. Patient denies pain at this time. Call light within reach and bed in lowest position. Reinforced the need to call for assistance. Plan of care discussed and patient does not have any further needs at this time.

## 2025-03-21 NOTE — THERAPY
Physical Therapy Contact Note    Patient Name: Sebastian Sinha  Age:  74 y.o., Sex:  male  Medical Record #: [id number]  Today's Date: 3/21/2025         03/21/25 1615   Initial Contact Note    Initial Contact Note Order Received and Verified, Physical Therapy Evaluation in Progress with Full Report to Follow.   Interdisciplinary Plan of Care Collaboration   IDT Collaboration with  Nursing;Other (See Comments)  (EMR)   Collaboration Comments PT orders received. Pt is pending wound care consult and possible surgical debridement. Of note pt likely to return to [facility] upon dc. Will monitor for acute care PT needs.   Session Information   Date / Session Number  3/21- hold  (EVAL)     Rosa Nagel, PT, DPT

## 2025-03-21 NOTE — PROGRESS NOTES
Hospital Medicine Daily Progress Note    Date of Service  3/21/2025    Chief Complaint  Sebastian Sinha is a 74 y.o. male admitted 3/20/2025 with ALOC (Pt w ALOC for 48 hrs per staff at [facility]. Pt w hx of cystitis and cellulitis to RLE)        Hospital Course  No notes on file    Interval Problem Update  Patient seen and examine at bedside  Medically cleared: No   Pending: Wound team recs, wound culture results for ID/Abx  Estimated Discharge Day:  Disposition: Return to [facility] SNF     Managing encephalopathy, LLE cellulitis, ARMANI. He has a history of atrial fibrillation on Eliquis therapy, rectal cancer with metastasis to lung status post chemotherapy and radiation, diabetes mellitus that was most recent admitted to this hospital from February 22 through fibber 26th with UTI and had urine and blood cultures positive for E. coli. He was transferred to [facility] skilled nursing facility after being treated with IV antibiotics and was transition to ciprofloxacin. During the hospitalization he had a wound to the left leg and had wound care.     AAO x 3 right now  just off month and name of this hospital. Essentially back to his baseline. Scabs on R leg bandages L leg   Cr- 2.2-1.8 bicarb 19 U/A possible UTI but also a dirty catch. Does show some amount of hypovolemia. No organisms, rare WBCs on Wound Gram. Wound team recs pending.  I reviewed the chart along with vitals, labs, imaging, test (both pending and resulted) and recommendations from specialists and interdisciplinary team.  I have discussed this patient's plan of care and discharge plan at IDT rounds today with Case Management, Nursing, Nursing leadership, and other members of the IDT team.    Consultants/Specialty      Code Status  DNAR/DNI    Disposition  The patient is not medically cleared for discharge to home or a post-acute facility.      I have placed the appropriate orders for post-discharge needs.    Review of Systems  Review of Systems    Unable to perform ROS: Mental acuity        Physical Exam  Temp:  [35.8 °C (96.5 °F)-36.1 °C (97 °F)] 35.8 °C (96.5 °F)  Pulse:  [67-78] 76  Resp:  [15-18] 18  BP: (111-148)/(60-95) 131/60  SpO2:  [92 %-96 %] 92 %    Physical Exam  Vitals and nursing note reviewed.   Constitutional:       Appearance: He is obese. He is ill-appearing.   HENT:      Head: Normocephalic and atraumatic.      Right Ear: External ear normal.      Left Ear: External ear normal.      Nose: Nose normal.      Mouth/Throat:      Mouth: Mucous membranes are moist.   Eyes:      General: No scleral icterus.     Conjunctiva/sclera: Conjunctivae normal.   Cardiovascular:      Rate and Rhythm: Normal rate and regular rhythm.      Heart sounds: No murmur heard.     No friction rub. No gallop.   Pulmonary:      Effort: Pulmonary effort is normal.      Breath sounds: Normal breath sounds.   Abdominal:      General: Abdomen is flat. Bowel sounds are normal. There is distension.      Palpations: Abdomen is soft.      Tenderness: There is no abdominal tenderness. There is no guarding.   Musculoskeletal:         General: Normal range of motion.      Cervical back: Normal range of motion and neck supple.   Skin:     General: Skin is warm.   Neurological:      Mental Status: He is alert and oriented to person, place, and time. Mental status is at baseline.      Motor: Weakness present.   Psychiatric:         Mood and Affect: Mood normal.         Behavior: Behavior normal.         Thought Content: Thought content normal.         Judgment: Judgment normal.         Fluids    Intake/Output Summary (Last 24 hours) at 3/21/2025 1611  Last data filed at 3/21/2025 1342  Gross per 24 hour   Intake 280 ml   Output --   Net 280 ml        Laboratory  Recent Labs     03/20/25  0950 03/21/25  0611   WBC 7.7 5.2   RBC 4.13* 4.36*   HEMOGLOBIN 11.2* 12.0*   HEMATOCRIT 36.5* 38.0*   MCV 88.4 87.2   MCH 27.1 27.5   MCHC 30.7* 31.6*   RDW 52.1* 50.4*   PLATELETCT 290 236    MPV 10.5 10.5     Recent Labs     03/20/25  0950 03/21/25  0611   SODIUM 138 142   POTASSIUM 4.0 4.1   CHLORIDE 103 108   CO2 23 19*   GLUCOSE 101* 100*   BUN 33* 24*   CREATININE 2.23* 1.83*   CALCIUM 9.4 9.1                   Imaging  CT-HEAD W/O   Final Result      1.  No evidence of acute territorial infarct, intracranial hemorrhage or mass lesion.   2.  Mild diffuse cerebral substance loss.   3.  Mild microangiopathic ischemic change versus demyelination or gliosis.               DX-CHEST-PORTABLE (1 VIEW)   Final Result      No acute cardiopulmonary disease evident.           Assessment/Plan  * Cellulitis of left lower extremity- (present on admission)  Assessment & Plan  Very significant left lower extremity cellulitis with extensive skin breakdown and necrosis  Antibiotics will be given  Wound care consult  He may require surgical debridement based on his response to fluids, antibiotics, wound care    Do not resuscitate- (present on admission)  Assessment & Plan  On his last admission he elected for DNR/DNI status.  He is currently not able to make his own decisions therefore we will abide by his previous wishes    Type 2 diabetes mellitus, without long-term current use of insulin ([facility])- (present on admission)  Assessment & Plan  He is on metformin and sitagliptin as an outpatient  Recent hemoglobin A1c 3 weeks ago was 9  He is currently too encephalopathic to tolerate a diet but once he is then consider starting sliding scale  Metformin will be held due to the elevated creatinine    AF (atrial fibrillation) ([facility])- (present on admission)  Assessment & Plan  Continue anticoagulation  Vitals:    03/21/25 1527   BP: 131/60   Pulse: 76   Resp: 18   Temp: 35.8 °C (96.5 °F)   SpO2: 92%         ARMANI (acute kidney injury) ([facility])- (present on admission)  Assessment & Plan  Creatinine 3 weeks ago was 1.2 currently up to 2.23  Likely due to dehydration in the setting of infection  IV fluids will be given  Follow urine  output  Basic metabolic panel ordered for the morning    Recent Labs     03/20/25  0950 03/21/25  0611   SODIUM 138 142   POTASSIUM 4.0 4.1   CHLORIDE 103 108   CO2 23 19*   GLUCOSE 101* 100*   BUN 33* 24*   CREATININE 2.23* 1.83*         Acute metabolic encephalopathy- (present on admission)  Assessment & Plan  Secondary to infection  Antibiotics, fluids intravenously, supportive care    Primary hypertension- (present on admission)  Assessment & Plan  Hold on blood pressure medications given risk of sepsis  Vitals:    03/21/25 1527   BP: 131/60   Pulse: 76   Resp: 18   Temp: 35.8 °C (96.5 °F)   SpO2: 92%         Metastatic lung cancer (metastasis from lung to other site), right ([facility])- (present on admission)  Assessment & Plan  History of         VTE prophylaxis: VTE Selection  Eliquis  I have performed a physical exam and reviewed and updated ROS and Plan today (3/21/2025). In review of yesterday's note (3/20/2025), there are no changes except as documented above.    Patient is has a high medical complexity, complex decision making and is at high risk for complication, morbidity, and mortality, thus requiring the highest level of my preparedness for sudden, emergent intervention. Medical decision making is therefore complex. I provided  services, which included ordering labs and/or imaging, and discussing the case with various consultants.medication orders, frequent reevaluations of the patient's condition and response to treatment. Time was also devoted to counseling and coordinating care including review of records, pertinent lab data and studies, as well as discussing diagnostic evaluation and work up, planned therapeutic interventions and future disposition of care. Where indicated, the assessment and plan reflect discussion of patient with consultants, other healthcare providers, family members, and additional research needed to obtain further information in formulating the plan of care for Sebastian Sinha. Total  time spent was 51 minutes.

## 2025-03-21 NOTE — PROGRESS NOTES
4 Eyes Skin Assessment Completed by MIKE Higgins and MIKE Crowley.    Head WDL  Ears Redness and Blanching  Nose WDL  Mouth WDL  Neck WDL  Breast/Chest WDL  Shoulder Blades WDL  Spine WDL  (R) Arm/Elbow/Hand Redness and Blanching  (L) Arm/Elbow/Hand Redness and Blanching  Abdomen Ostomy  Groin Redness and Blanching  Scrotum/Coccyx/Buttocks Redness, Blanching, Excoriation, and Moisture Fissure  (R) Leg Redness, Scab, and Edema  (L) Leg Redness and Edema  (R) Heel/Foot/Toe WDL  (L) Heel/Foot/Toe Open wound medial ankle          Devices In Places Pulse Ox, Penile pouch      Interventions In Place Heel Mepilex, Heel Float Boots, TAP System, Pillows, Q2 Turns, Low Air Loss Mattress, and Barrier Cream    Possible Skin Injury Yes    Pictures Uploaded Into Epic Yes  Wound Consult Placed Yes  RN Wound Prevention Protocol Ordered Yes

## 2025-03-21 NOTE — PROGRESS NOTES
Received report and assumed care of patient at change of shift. Patient is A&Ox2, disoriented to time and situation, on room air, and reports pain with repositioning. Patient responds to pain, but does not respond to voice. Patient assessment completed, bed in lowest position, and call light and personal belongings are within reach. Patient expressed no further needs at this time. Fall precautions in place. Low air loss mattress in use. Q2h turns in place.

## 2025-03-21 NOTE — CARE PLAN
The patient is Stable - Low risk of patient condition declining or worsening    Shift Goals  Clinical Goals: Patient will remain at a comfortable pain level and remain hemodynamically stable throughout the shift.  Patient Goals: Pain control, rest  Family Goals: DIANNA    Progress made toward(s) clinical / shift goals: Patient has remained free of pain and free from falls. He understands his plan of care and has maintained oxygen levels on room air.      Problem: Pain - Standard  Goal: Alleviation of pain or a reduction in pain to the patient’s comfort goal  Outcome: Progressing     Problem: Knowledge Deficit - Standard  Goal: Patient and family/care givers will demonstrate understanding of plan of care, disease process/condition, diagnostic tests and medications  Outcome: Progressing     Problem: Hemodynamics  Goal: Patient's hemodynamics, fluid balance and neurologic status will be stable or improve  Outcome: Progressing     Problem: Fluid Volume  Goal: Fluid volume balance will be maintained  Outcome: Progressing     Problem: Urinary - Renal Perfusion  Goal: Ability to achieve and maintain adequate renal perfusion and functioning will improve  Outcome: Progressing     Problem: Respiratory  Goal: Patient will achieve/maintain optimum respiratory ventilation and gas exchange  Outcome: Progressing     Problem: Physical Regulation  Goal: Diagnostic test results will improve  Outcome: Progressing     Problem: Skin Integrity  Goal: Skin integrity is maintained or improved  Outcome: Progressing     Problem: Fall Risk  Goal: Patient will remain free from falls  Outcome: Progressing       Patient is not progressing towards the following goals:

## 2025-03-22 ENCOUNTER — APPOINTMENT (OUTPATIENT)
Dept: RADIOLOGY | Facility: MEDICAL CENTER | Age: 75
DRG: 602 | End: 2025-03-22
Attending: INTERNAL MEDICINE
Payer: COMMERCIAL

## 2025-03-22 LAB
ALBUMIN SERPL BCP-MCNC: 2.8 G/DL (ref 3.2–4.9)
BUN SERPL-MCNC: 17 MG/DL (ref 8–22)
CALCIUM ALBUM COR SERPL-MCNC: 9.5 MG/DL (ref 8.5–10.5)
CALCIUM SERPL-MCNC: 8.5 MG/DL (ref 8.5–10.5)
CHLORIDE SERPL-SCNC: 106 MMOL/L (ref 96–112)
CO2 SERPL-SCNC: 22 MMOL/L (ref 20–33)
CREAT SERPL-MCNC: 1.61 MG/DL (ref 0.5–1.4)
GFR SERPLBLD CREATININE-BSD FMLA CKD-EPI: 45 ML/MIN/1.73 M 2
GLUCOSE SERPL-MCNC: 140 MG/DL (ref 65–99)
PHOSPHATE SERPL-MCNC: 2.6 MG/DL (ref 2.5–4.5)
POTASSIUM SERPL-SCNC: 4.2 MMOL/L (ref 3.6–5.5)
SODIUM SERPL-SCNC: 139 MMOL/L (ref 135–145)

## 2025-03-22 PROCEDURE — 700102 HCHG RX REV CODE 250 W/ 637 OVERRIDE(OP): Performed by: HOSPITALIST

## 2025-03-22 PROCEDURE — 700111 HCHG RX REV CODE 636 W/ 250 OVERRIDE (IP): Performed by: INTERNAL MEDICINE

## 2025-03-22 PROCEDURE — 80069 RENAL FUNCTION PANEL: CPT

## 2025-03-22 PROCEDURE — 700102 HCHG RX REV CODE 250 W/ 637 OVERRIDE(OP): Performed by: INTERNAL MEDICINE

## 2025-03-22 PROCEDURE — A9270 NON-COVERED ITEM OR SERVICE: HCPCS | Performed by: INTERNAL MEDICINE

## 2025-03-22 PROCEDURE — A9270 NON-COVERED ITEM OR SERVICE: HCPCS | Performed by: HOSPITALIST

## 2025-03-22 PROCEDURE — 99233 SBSQ HOSP IP/OBS HIGH 50: CPT | Performed by: INTERNAL MEDICINE

## 2025-03-22 PROCEDURE — 73590 X-RAY EXAM OF LOWER LEG: CPT | Mod: LT

## 2025-03-22 PROCEDURE — 36415 COLL VENOUS BLD VENIPUNCTURE: CPT

## 2025-03-22 PROCEDURE — 770004 HCHG ROOM/CARE - ONCOLOGY PRIVATE *

## 2025-03-22 PROCEDURE — 700105 HCHG RX REV CODE 258: Performed by: INTERNAL MEDICINE

## 2025-03-22 RX ADMIN — ATORVASTATIN CALCIUM 20 MG: 20 TABLET, FILM COATED ORAL at 17:32

## 2025-03-22 RX ADMIN — APIXABAN 5 MG: 5 TABLET, FILM COATED ORAL at 05:23

## 2025-03-22 RX ADMIN — SODIUM BICARBONATE 650 MG: 650 TABLET ORAL at 17:32

## 2025-03-22 RX ADMIN — APIXABAN 5 MG: 5 TABLET, FILM COATED ORAL at 17:32

## 2025-03-22 RX ADMIN — SODIUM BICARBONATE 650 MG: 650 TABLET ORAL at 08:08

## 2025-03-22 RX ADMIN — Medication 100 MG: at 05:23

## 2025-03-22 RX ADMIN — LINEZOLID 600 MG: 600 TABLET, FILM COATED ORAL at 08:08

## 2025-03-22 RX ADMIN — THERA TABS 1 TABLET: TAB at 05:23

## 2025-03-22 RX ADMIN — LINEZOLID 600 MG: 600 TABLET, FILM COATED ORAL at 20:24

## 2025-03-22 RX ADMIN — VENLAFAXINE HYDROCHLORIDE 37.5 MG: 37.5 CAPSULE, EXTENDED RELEASE ORAL at 08:08

## 2025-03-22 RX ADMIN — CEFTRIAXONE SODIUM 2000 MG: 10 INJECTION, POWDER, FOR SOLUTION INTRAVENOUS at 17:33

## 2025-03-22 RX ADMIN — ACETAMINOPHEN 650 MG: 325 TABLET ORAL at 20:36

## 2025-03-22 ASSESSMENT — PAIN DESCRIPTION - PAIN TYPE
TYPE: ACUTE PAIN

## 2025-03-22 NOTE — PROGRESS NOTES
"Assumed care of patient at 1900 from day RN. Patient is A&O x 3. Bed locked in the lowest position, 2 side rails up, call light is within reach, belongings at bedside. Pt reports pain level of 0 /10. Mobility bedbound. Oxygen RA. Low air loss mattress. Heel boot protectors. Left leg bandage clean, dry, intact. Explained plan of care and safety precautions to pt, pt has no questions at this time. Hourly rounding is in place. /86   Pulse 80   Temp 36.2 °C (97.1 °F) (Temporal)   Resp 18   Ht 1.778 m (5' 10\")   Wt 95.3 kg (210 lb)   SpO2 97%   BMI 30.13 kg/m²   "

## 2025-03-22 NOTE — PROGRESS NOTES
Patient report received and assumed care. Assessed patient at 0800. Patient is Aox4 and reports no pain. Patient has LR running through peripheral line at 125mL/hr. Patient is incontinent of both and has ostomy and penis pouch, both are rakesh and intact. Patient has cellulitis of lower extremities. Left leg dressing is CDI. Right leg is red, warm, and has generalized scabs. Patient lying on left side with pillows. Patient high fall risk. Patient bedbound at baseline. Patient bed is in low, locked position with bed alarm on. Standard fall precautions are in place. Personal belonging and call light are within reach. Patient reinforced to use call light when needed.

## 2025-03-22 NOTE — PROGRESS NOTES
4 Eyes Skin Assessment Completed by MIKE connolly and MIKE caballero.    Head WDL  Ears WDL  Nose WDL  Mouth WDL  Neck WDL  Breast/Chest WDL  Shoulder Blades unable to access. Pt refused  Spine unable to access. Pt refused  (R) Arm/Elbow/Hand Redness and Blanching  (L) Arm/Elbow/Hand Redness and Blanching  Abdomen Redness blanching  Groin Redness and Blanching  Scrotum/Coccyx/Buttocks unable to access. Pt refused  (R) Leg Redness, Blanching, Scab, Swelling, and Edema  (L) Leg unable to access. Pt refused  (R) Heel/Foot/Toe Redness and Blanching unable to access complete foot/heel. Pt refused  (L) Heel/Foot/Toe Redness and Blanching unable to access complete foot/heel. Pt refused          Devices In Places       Interventions In Place Heel Mepilex, Heel Float Boots, Pillows, Elbow Mepilex, Low Air Loss Mattress, and Barrier Cream    Possible Skin Injury No    Pictures Uploaded Into Epic Yes  Wound Consult Placed Yes  RN Wound Prevention Protocol Ordered Yes

## 2025-03-22 NOTE — WOUND TEAM
"[facility]  Inpatient Services  Initial Wound and Skin Care Evaluation    Admission Date: 3/20/2025     Last order of IP CONSULT TO WOUND CARE was found on 3/20/2025 from Hospital Encounter on 3/20/2025     HPI, PMH, SH: Reviewed    Past Surgical History:   Procedure Laterality Date    CATARACT EXTRACTION WITH IOL Bilateral 2020    COLONOSCOPY  01/22/2018    biopsy colonic mucosa anastamosis @ 10cm  positive for moderately diff adenocarcinoma    LOW ANTERIOR RESECTION LAPAROSCOPIC  03/27/2015    (followed by wound infection 4/14/15)    COLONOSCOPY      Aug 2017    OTHER      oral surgery as a child    OTHER ABDOMINAL SURGERY      appendectomy    TOE AMPUTATION Bilateral     R 2nd digit, L 2nd digit     Social History     Tobacco Use    Smoking status: Former     Types: Cigarettes    Smokeless tobacco: Never    Tobacco comments:     \"Rare use 50 years ago, total 3 weeks\"   Substance Use Topics    Alcohol use: No     Chief Complaint   Patient presents with    ALOC     Pt w ALOC for 48 hrs per staff at [facility]. Pt w hx of cystitis and cellulitis to RLE     Diagnosis: Cellulitis of left lower extremity [L03.116]    Unit where seen by Wound Team: [id number]    WOUND CONSULT RELATED TO:  RLE    WOUND TEAM PLAN OF CARE - Frequency of Follow-up:   Nursing to follow dressing orders written for wound care. Contact wound team if area fails to progress, deteriorates or with any questions/concerns if something comes up before next scheduled follow up (See below as to whether wound is following and frequency of wound follow up)   Weekly - RLE    WOUND HISTORY:     Admitted for ALOC. Hx of cellulitis        WOUND ASSESSMENT/LDA    Wound 02/22/25 Venous Ulcer Leg Anterior;Lateral Left (Active)   Date First Assessed/Time First Assessed: 02/22/25 2030   Present on Original Admission: Yes  Hand Hygiene Completed: Yes  Primary Wound Type: Venous Ulcer  Location: Leg  Wound Orientation: Anterior;Lateral  Laterality: Left "      Assessments 3/21/2025 10:00 AM   Wound Image     Site Assessment Red;Bleeding   Periwound Assessment Warm;Edema;Fragile   Margins Defined edges;Attached edges   Closure Secondary intention   Drainage Amount Small   Drainage Description Sanguineous   Treatments Cleansed;Site care   Wound Cleansing Approved Wound Cleanser   Periwound Protectant Skin Protectant Wipes to Periwound   Dressing Status Clean;Dry;Intact   Dressing Changed New   Dressing Cleansing/Solutions Not Applicable   Dressing Options Hydrofera Blue Ready;Offloading Dressing - Sacral;Dry Roll Gauze   Dressing Change/Treatment Frequency Every 72 hrs, and As Needed   NEXT Dressing Change/Treatment Date 03/25/25   NEXT Weekly Photo (Inpatient Only) 03/29/25   Wound Team Following Weekly   Non-staged Wound Description Full thickness   Shape irregular   Exposed Structures None   WOUND NURSE ONLY - Time Spent with Patient (mins) 45        Vascular:    TERRIE:   No results found.    Lab Values:    Lab Results   Component Value Date/Time    WBC 5.2 03/21/2025 06:11 AM    RBC 4.36 (L) 03/21/2025 06:11 AM    HEMOGLOBIN 12.0 (L) 03/21/2025 06:11 AM    HEMATOCRIT 38.0 (L) 03/21/2025 06:11 AM    CREACTPROT 20.60 (H) 02/22/2025 07:07 PM    SEDRATEWES 65 (H) 02/22/2025 05:26 PM    HBA1C 9.0 (H) 02/25/2025 04:26 AM         Culture Results show:  Recent Results (from the past 720 hours)   Culture Wound w/Gram Stain    Collection Time: 02/23/25 12:00 PM    Specimen: Left Leg; Wound   Result Value Ref Range    Significant Indicator NEG     Source WND     Site LEFT LEG     Culture Result Light growth usual skin mary anne.     Gram Stain Result No organisms seen.        Pain Level/Medicated:  None, Tolerated without pain medication       INTERVENTIONS BY WOUND TEAM:  Chart and images reviewed. Discussed with bedside RN. All areas of concern (based on picture review, LDA review and discussion with bedside RN) have been thoroughly assessed. Documentation of areas based on  significant findings. This RN in to assess patient. Performed standard wound care which includes appropriate positioning, dressing removal and non-selective debridement. Pictures and measurements obtained weekly if/when required.    Wound:  LLE  Preparation for Dressing removal: Removed without difficulty  Cleansed/Non-selectively Debrided with:  Wound cleanser and Gauze  Deana wound: Cleansed with Wound cleanser and Gauze, Prepped with No Sting  Primary Dressing:  HFB   Secondary (Outer) Dressing: sacral adhesive foam, roll gauze     Advanced Wound Care Discharge Planning  Number of Clinicians necessary to complete wound care: 1  Is patient requiring IV pain medications for dressing changes:  No   Length of time for dressing change 40 min. (This does not include chart review, pre-medication time, set up, clean up or time spent charting.)    Interdisciplinary consultation: Patient, Bedside RN, N/A.  Pressure injury and staging reviewed with N/A.    EVALUATION / RATIONALE FOR TREATMENT:     Date:  03/22/25  Wound Status:  Initial evaluation    Patient with scattered venous ulcerations along his posterior left leg. Wounds shallow, however draining with moderate sanguinous drainage. Hydrofera Blue applied for the hydrophilic polyurethane foam which contains ethylene oxide used as a bactericidal, fungicidal, and sporicidal disinfectant. Hydrofera Blue also aids in maintaining a moist wound environment. The absorption properties of this dressing are important in collecting exudates and bacteria from the injured area. These harmful fluid secretions bind to the dressing removing it from the wound without the foam sticking to the wound causing more harm. Patient would benefit from compression therapy, however there is concern for cellulitis at this time thus will hold off on compression.     Right anterior leg with scattered scabs, left open to air.     BL heels intact, recommend offloading measures     Patient declined  sacral assessment.     Ostomy appliance intact as well. Pt cares for ostomy independently.            Goals: Steady decrease in wound area and depth weekly.    NURSING PLAN OF CARE ORDERS:  Dressing changes: See Dressing Care orders    NUTRITION RECOMMENDATIONS   Wound Team Recommendations:  N/A    DIET ORDERS (From admission to next 24h)       Start     Ordered    03/20/25 1334  Diet Order Diet: Consistent CHO (Diabetic)  ALL MEALS        Question:  Diet:  Answer:  Consistent CHO (Diabetic)    03/20/25 1334                    PREVENTATIVE INTERVENTIONS:    Q shift Tyrel - performed per nursing policy  Q shift pressure point assessments - performed per nursing policy    Surface/Positioning  Standard/trauma mattress - Currently in Place  Reposition q 2 hours - Currently in Place    Offloading/Redistribution  Float Heels off Bed with Pillows - Currently in Place           Containment/Moisture Prevention    Dri-enmanuel pad - Currently in Place    Anticipated discharge plans:  TBD        Vac Discharge Needs:  Vac Discharge plan is purely a recommendation from wound team and not a requirement for discharge unless otherwise stated by physician.  Not Applicable Pt not on a wound vac

## 2025-03-22 NOTE — PROGRESS NOTES
4 Eyes Skin Assessment Completed by Angelita Irizarry, RN and Delmi Cerda , MIKE.    Head WDL  Ears Redness and Blanching  Nose WDL  Mouth WDL  Neck WDL  Breast/Chest WDL  Shoulder Blades WDL  Spine WDL  (R) Arm/Elbow/Hand Redness and Blanching  (L) Arm/Elbow/Hand Redness and Blanching  Abdomen WDL, ostomy, right panus rash     Groin Redness, Excoriation, and Swelling    Scrotum/Coccyx/Buttocks Redness and Excoriation    (R) Leg Redness, Scab, Bruising, Swelling, Abrasion, Shiny, Weeping, and Edema    (L) Leg Redness, Scab, Bruising, Swelling, Abrasion, Weeping, and Edema    (R) Heel/Foot/Toe Redness and Blanching, missing second digit   (L) Heel/Foot/Toe Redness and Blanching, missing second digit           Devices In Places Condom Cath      Interventions In Place Heel Mepilex, Heel Float Boots, TAP System, Pillows, Elbow Mepilex, Q2 Turns, Low Air Loss Mattress, Barrier Cream, and Heels Loaded W/Pillows    Possible Skin Injury Yes    Pictures Uploaded Into Epic Yes  Wound Consult Placed Yes  RN Wound Prevention Protocol Ordered Yes

## 2025-03-22 NOTE — PROGRESS NOTES
Report given to RN. Patient left unit to CNU by hospital bed. All personal belongings gathered. Patient transferring for MRSA of th wound, contact isolation.

## 2025-03-22 NOTE — PROGRESS NOTES
Unable to complete full 2RN skin check. Pt refused to allow staff to turn him. Educated patient on importance of monitoring skin. Pt continued to refused. Skin check performed on visible areas and documented. Charge RN notified.

## 2025-03-22 NOTE — PROGRESS NOTES
4 Eyes Skin Assessment Completed by MIKE Ordonez and MIKE Swann.    Head   flaky  Ears    flaky  Nose  flaky   Mouth flaky  Neck WDL  Breast/Chest WDL  Shoulder Blades WDL  Spine WDL  (R) Arm/Elbow/Hand Blanching, redness  (L) Arm/Elbow/Hand Redness and Blanching  Abdomen ostomy in place  Groin Redness, rash  Scrotum/Coccyx/Buttocks Redness, Excoriation, and Moisture Fissure  (R) Leg discoloration, scabbing, cellulitis wound to calf  (L) Leg discoloration, cellulitis wound to calf   (R) Heel/Foot/Toe 2nd toe amputation, redness and callus to bottom of foot  (L) Heel/Foot/Toe   2nd toe amputation, wound to medial ankle          Devices In Places  IV, ostomy, penis pouch      Interventions In Place Heel Mepilex, Heel Float Boots, Pillows, Q2 Turns, and Low Air Loss Mattress    Possible Skin Injury Yes    Pictures Uploaded Into Epic Yes  Wound Consult Placed Yes  RN Wound Prevention Protocol Ordered Yes

## 2025-03-22 NOTE — PROGRESS NOTES
Hospital Medicine Daily Progress Note    Date of Service  3/22/2025    Chief Complaint  Sebastian Sinha is a 74 y.o. male admitted 3/20/2025 with ALOC (Pt w ALOC for 48 hrs per staff at [facility]. Pt w hx of cystitis and cellulitis to RLE)        Hospital Course  No notes on file    Interval Problem Update  Patient seen and examine at bedside  Medically cleared: No   Pending: Wound team recs, wound culture semsitivities for ID/Abx  Estimated Discharge Day:  Disposition: Return to [facility] SNF     Managing encephalopathy, LLE cellulitis, ARMANI. He has a history of atrial fibrillation on Eliquis therapy, rectal cancer with metastasis to lung status post chemotherapy and radiation, diabetes mellitus that was most recent admitted to this hospital from February 22 through fiber 26th with UTI and had urine and blood cultures positive for E. coli. He was transferred to [facility] skilled nursing facility after being treated with IV antibiotics and was transition to ciprofloxacin. During the hospitalization he had a wound to the left leg and had wound care.     AAO x 3.5  just off month and name of this hospital. Essentially back to his baseline. Scabs on R leg bandages L leg. Cr- 2.2-1.8-1.61 bicarb 19-22 Improved. U/A possible UTI but also a dirty catch. Does show some amount of hypovolemia. No organisms, rare WBCs on Wound Gram. Culture now grwoing MRSA, E. Coli (susceptibilities PENDING), Klebsiella pneumonia (also susceptibilties PENDING). Ordered LLE XR PENDING came back NO osteomyelitis or gas. Wound team recs PENDING   Monitor for hemoglobin/bleeding, melena and renal function when on DOACs.  Monitor CBC when on linezolid.  I reviewed the chart along with vitals, labs, imaging, test (both pending and resulted) and recommendations from specialists and interdisciplinary team.  I have discussed this patient's plan of care and discharge plan at IDT rounds today with Case Management, Nursing, Nursing leadership, and  other members of the IDT team.    Consultants/Specialty      Code Status  DNAR/DNI    Disposition  Medically Cleared  I have placed the appropriate orders for post-discharge needs.    Review of Systems  Review of Systems   Unable to perform ROS: Mental acuity        Physical Exam  Temp:  [36 °C (96.8 °F)-36.6 °C (97.8 °F)] 36.6 °C (97.8 °F)  Pulse:  [] 101  Resp:  [16-22] 22  BP: (114-138)/(66-86) 138/75  SpO2:  [96 %-97 %] 96 %    Physical Exam  Vitals and nursing note reviewed.   Constitutional:       Appearance: He is obese. He is ill-appearing.   HENT:      Head: Normocephalic and atraumatic.      Right Ear: External ear normal.      Left Ear: External ear normal.      Nose: Nose normal.      Mouth/Throat:      Mouth: Mucous membranes are moist.   Eyes:      General: No scleral icterus.     Conjunctiva/sclera: Conjunctivae normal.   Cardiovascular:      Rate and Rhythm: Normal rate and regular rhythm.      Heart sounds: No murmur heard.     No friction rub. No gallop.   Pulmonary:      Effort: Pulmonary effort is normal.      Breath sounds: Normal breath sounds.   Abdominal:      General: Abdomen is flat. Bowel sounds are normal. There is distension.      Palpations: Abdomen is soft.      Tenderness: There is no abdominal tenderness. There is no guarding.   Musculoskeletal:         General: Normal range of motion.      Cervical back: Normal range of motion and neck supple.   Skin:     General: Skin is warm.   Neurological:      Mental Status: He is alert and oriented to person, place, and time. Mental status is at baseline.      Motor: Weakness present.   Psychiatric:         Mood and Affect: Mood normal.         Behavior: Behavior normal.         Thought Content: Thought content normal.         Judgment: Judgment normal.         Fluids    Intake/Output Summary (Last 24 hours) at 3/22/2025 1610  Last data filed at 3/22/2025 0800  Gross per 24 hour   Intake 240 ml   Output 300 ml   Net -60 ml         Laboratory  Recent Labs     03/20/25  0950 03/21/25  0611   WBC 7.7 5.2   RBC 4.13* 4.36*   HEMOGLOBIN 11.2* 12.0*   HEMATOCRIT 36.5* 38.0*   MCV 88.4 87.2   MCH 27.1 27.5   MCHC 30.7* 31.6*   RDW 52.1* 50.4*   PLATELETCT 290 236   MPV 10.5 10.5     Recent Labs     03/20/25  0950 03/21/25  0611 03/22/25  1121   SODIUM 138 142 139   POTASSIUM 4.0 4.1 4.2   CHLORIDE 103 108 106   CO2 23 19* 22   GLUCOSE 101* 100* 140*   BUN 33* 24* 17   CREATININE 2.23* 1.83* 1.61*   CALCIUM 9.4 9.1 8.5                   Imaging  DX-TIBIA AND FIBULA LEFT   Final Result         1. No left tibiofibular fracture.      2. Mild ankle and knee degenerative changes.                        CT-HEAD W/O   Final Result      1.  No evidence of acute territorial infarct, intracranial hemorrhage or mass lesion.   2.  Mild diffuse cerebral substance loss.   3.  Mild microangiopathic ischemic change versus demyelination or gliosis.               DX-CHEST-PORTABLE (1 VIEW)   Final Result      No acute cardiopulmonary disease evident.           Assessment/Plan  * Cellulitis of left lower extremity- (present on admission)  Assessment & Plan  Very significant left lower extremity cellulitis with extensive skin breakdown and necrosis  Antibiotics will be given  Wound care consult  He may require surgical debridement based on his response to fluids, antibiotics, wound care    Do not resuscitate- (present on admission)  Assessment & Plan  On his last admission he elected for DNR/DNI status.  He is currently not able to make his own decisions therefore we will abide by his previous wishes    Type 2 diabetes mellitus, without long-term current use of insulin (HCC)- (present on admission)  Assessment & Plan  He is on metformin and sitagliptin as an outpatient  Recent hemoglobin A1c 3 weeks ago was 9  He is currently too encephalopathic to tolerate a diet but once he is then consider starting sliding scale  Metformin will be held due to the elevated  creatinine    AF (atrial fibrillation) (HCC)- (present on admission)  Assessment & Plan  Continue anticoagulation  Vitals:    03/21/25 1527   BP: 131/60   Pulse: 76   Resp: 18   Temp: 35.8 °C (96.5 °F)   SpO2: 92%         ARMANI (acute kidney injury) (HCC)- (present on admission)  Assessment & Plan  Creatinine 3 weeks ago was 1.2 currently up to 2.23  Likely due to dehydration in the setting of infection  IV fluids will be given  Follow urine output  Basic metabolic panel ordered for the morning    Recent Labs     03/20/25  0950 03/21/25  0611 03/22/25  1121   SODIUM 138 142 139   POTASSIUM 4.0 4.1 4.2   CHLORIDE 103 108 106   CO2 23 19* 22   GLUCOSE 101* 100* 140*   BUN 33* 24* 17   CREATININE 2.23* 1.83* 1.61*         Acute metabolic encephalopathy- (present on admission)  Assessment & Plan  Secondary to infection  Antibiotics, fluids intravenously, supportive care    Primary hypertension- (present on admission)  Assessment & Plan  Hold on blood pressure medications given risk of sepsis  Vitals:    03/22/25 1552   BP: 138/75   Pulse: (!) 101   Resp: (!) 22   Temp: 36.6 °C (97.8 °F)   SpO2: 96%         Metastatic lung cancer (metastasis from lung to other site), right (HCC)- (present on admission)  Assessment & Plan  History of         VTE prophylaxis: VTE Selection  Eliquis  I have performed a physical exam and reviewed and updated ROS and Plan today (3/22/2025). In review of yesterday's note (3/21/2025), there are no changes except as documented above.    Patient is has a high medical complexity, complex decision making and is at high risk for complication, morbidity, and mortality, thus requiring the highest level of my preparedness for sudden, emergent intervention. Medical decision making is therefore complex. I provided  services, which included ordering labs and/or imaging, and discussing the case with various consultants.medication orders, frequent reevaluations of the patient's condition and response to  treatment. Time was also devoted to counseling and coordinating care including review of records, pertinent lab data and studies, as well as discussing diagnostic evaluation and work up, planned therapeutic interventions and future disposition of care. Where indicated, the assessment and plan reflect discussion of patient with consultants, other healthcare providers, family members, and additional research needed to obtain further information in formulating the plan of care for Sebastian Sinha. Total time spent was 51 minutes.

## 2025-03-22 NOTE — CARE PLAN
The patient is Stable - Low risk of patient condition declining or worsening    Shift Goals  Clinical Goals: pt will remain free from falls, maintain current level of skin intergity, and remain pain free this shift  Patient Goals: sleep  Family Goals: DIANNA    Progress made toward(s) clinical / shift goals:  pt remained free from falls and pain this shift. Pt refused complete skin check stating it is too painful to move. Pt educated and medication offered. Pt declined.    Problem: Knowledge Deficit - Standard  Goal: Patient and family/care givers will demonstrate understanding of plan of care, disease process/condition, diagnostic tests and medications  Outcome: Progressing     Problem: Respiratory  Goal: Patient will achieve/maintain optimum respiratory ventilation and gas exchange  Outcome: Progressing     Problem: Fall Risk  Goal: Patient will remain free from falls  Outcome: Progressing     Patient is not progressing towards the following goals:

## 2025-03-23 PROBLEM — E11.42 TYPE 2 DIABETES MELLITUS WITH DIABETIC POLYNEUROPATHY, WITHOUT LONG-TERM CURRENT USE OF INSULIN (HCC): Status: ACTIVE | Noted: 2025-03-20

## 2025-03-23 PROBLEM — N18.9 ACUTE KIDNEY INJURY SUPERIMPOSED ON CHRONIC KIDNEY DISEASE (HCC): Status: ACTIVE | Noted: 2025-02-22

## 2025-03-23 PROBLEM — E87.20 METABOLIC ACIDOSIS: Status: ACTIVE | Noted: 2025-03-23

## 2025-03-23 PROBLEM — I48.0 PAROXYSMAL ATRIAL FIBRILLATION (HCC): Status: ACTIVE | Noted: 2025-03-20

## 2025-03-23 LAB
ALBUMIN SERPL BCP-MCNC: 2.7 G/DL (ref 3.2–4.9)
BACTERIA WND AEROBE CULT: ABNORMAL
BUN SERPL-MCNC: 15 MG/DL (ref 8–22)
CALCIUM ALBUM COR SERPL-MCNC: 9.4 MG/DL (ref 8.5–10.5)
CALCIUM SERPL-MCNC: 8.4 MG/DL (ref 8.5–10.5)
CHLORIDE SERPL-SCNC: 107 MMOL/L (ref 96–112)
CO2 SERPL-SCNC: 21 MMOL/L (ref 20–33)
CREAT SERPL-MCNC: 1.59 MG/DL (ref 0.5–1.4)
ERYTHROCYTE [DISTWIDTH] IN BLOOD BY AUTOMATED COUNT: 50.9 FL (ref 35.9–50)
GFR SERPLBLD CREATININE-BSD FMLA CKD-EPI: 46 ML/MIN/1.73 M 2
GLUCOSE SERPL-MCNC: 108 MG/DL (ref 65–99)
GRAM STN SPEC: ABNORMAL
HCT VFR BLD AUTO: 32.2 % (ref 42–52)
HGB BLD-MCNC: 10.2 G/DL (ref 14–18)
MCH RBC QN AUTO: 27.4 PG (ref 27–33)
MCHC RBC AUTO-ENTMCNC: 31.7 G/DL (ref 32.3–36.5)
MCV RBC AUTO: 86.6 FL (ref 81.4–97.8)
PHOSPHATE SERPL-MCNC: 2.7 MG/DL (ref 2.5–4.5)
PLATELET # BLD AUTO: 256 K/UL (ref 164–446)
PMV BLD AUTO: 10.8 FL (ref 9–12.9)
POTASSIUM SERPL-SCNC: 4.3 MMOL/L (ref 3.6–5.5)
RBC # BLD AUTO: 3.72 M/UL (ref 4.7–6.1)
SIGNIFICANT IND 70042: ABNORMAL
SITE SITE: ABNORMAL
SODIUM SERPL-SCNC: 140 MMOL/L (ref 135–145)
SOURCE SOURCE: ABNORMAL
WBC # BLD AUTO: 7.4 K/UL (ref 4.8–10.8)

## 2025-03-23 PROCEDURE — 36415 COLL VENOUS BLD VENIPUNCTURE: CPT

## 2025-03-23 PROCEDURE — 80069 RENAL FUNCTION PANEL: CPT

## 2025-03-23 PROCEDURE — A9270 NON-COVERED ITEM OR SERVICE: HCPCS

## 2025-03-23 PROCEDURE — 700102 HCHG RX REV CODE 250 W/ 637 OVERRIDE(OP): Performed by: INTERNAL MEDICINE

## 2025-03-23 PROCEDURE — 85027 COMPLETE CBC AUTOMATED: CPT

## 2025-03-23 PROCEDURE — A9270 NON-COVERED ITEM OR SERVICE: HCPCS | Performed by: STUDENT IN AN ORGANIZED HEALTH CARE EDUCATION/TRAINING PROGRAM

## 2025-03-23 PROCEDURE — 700105 HCHG RX REV CODE 258: Performed by: INTERNAL MEDICINE

## 2025-03-23 PROCEDURE — 700102 HCHG RX REV CODE 250 W/ 637 OVERRIDE(OP): Performed by: HOSPITALIST

## 2025-03-23 PROCEDURE — A9270 NON-COVERED ITEM OR SERVICE: HCPCS | Performed by: INTERNAL MEDICINE

## 2025-03-23 PROCEDURE — A9270 NON-COVERED ITEM OR SERVICE: HCPCS | Performed by: HOSPITALIST

## 2025-03-23 PROCEDURE — 700102 HCHG RX REV CODE 250 W/ 637 OVERRIDE(OP): Performed by: STUDENT IN AN ORGANIZED HEALTH CARE EDUCATION/TRAINING PROGRAM

## 2025-03-23 PROCEDURE — 700102 HCHG RX REV CODE 250 W/ 637 OVERRIDE(OP)

## 2025-03-23 PROCEDURE — 99232 SBSQ HOSP IP/OBS MODERATE 35: CPT | Performed by: STUDENT IN AN ORGANIZED HEALTH CARE EDUCATION/TRAINING PROGRAM

## 2025-03-23 PROCEDURE — 770004 HCHG ROOM/CARE - ONCOLOGY PRIVATE *

## 2025-03-23 PROCEDURE — 700111 HCHG RX REV CODE 636 W/ 250 OVERRIDE (IP): Performed by: INTERNAL MEDICINE

## 2025-03-23 PROCEDURE — 700105 HCHG RX REV CODE 258: Performed by: HOSPITALIST

## 2025-03-23 RX ORDER — OXYCODONE HYDROCHLORIDE 5 MG/1
2.5 TABLET ORAL
Refills: 0 | Status: DISCONTINUED | OUTPATIENT
Start: 2025-03-23 | End: 2025-03-24 | Stop reason: HOSPADM

## 2025-03-23 RX ORDER — DULOXETIN HYDROCHLORIDE 30 MG/1
30 CAPSULE, DELAYED RELEASE ORAL DAILY
Status: DISCONTINUED | OUTPATIENT
Start: 2025-03-23 | End: 2025-03-24 | Stop reason: HOSPADM

## 2025-03-23 RX ORDER — OXYCODONE HYDROCHLORIDE 5 MG/1
5 TABLET ORAL
Refills: 0 | Status: DISCONTINUED | OUTPATIENT
Start: 2025-03-23 | End: 2025-03-24 | Stop reason: HOSPADM

## 2025-03-23 RX ORDER — NYSTATIN 100000 [USP'U]/G
POWDER TOPICAL 2 TIMES DAILY
Status: DISCONTINUED | OUTPATIENT
Start: 2025-03-23 | End: 2025-03-24 | Stop reason: HOSPADM

## 2025-03-23 RX ADMIN — OXYCODONE 5 MG: 5 TABLET ORAL at 20:30

## 2025-03-23 RX ADMIN — APIXABAN 5 MG: 5 TABLET, FILM COATED ORAL at 04:10

## 2025-03-23 RX ADMIN — Medication 100 MG: at 04:10

## 2025-03-23 RX ADMIN — THERA TABS 1 TABLET: TAB at 04:10

## 2025-03-23 RX ADMIN — SODIUM CHLORIDE, POTASSIUM CHLORIDE, SODIUM LACTATE AND CALCIUM CHLORIDE: 600; 310; 30; 20 INJECTION, SOLUTION INTRAVENOUS at 10:41

## 2025-03-23 RX ADMIN — SODIUM BICARBONATE 650 MG: 650 TABLET ORAL at 07:38

## 2025-03-23 RX ADMIN — OXYCODONE 5 MG: 5 TABLET ORAL at 16:40

## 2025-03-23 RX ADMIN — SODIUM BICARBONATE 650 MG: 650 TABLET ORAL at 16:41

## 2025-03-23 RX ADMIN — APIXABAN 5 MG: 5 TABLET, FILM COATED ORAL at 16:41

## 2025-03-23 RX ADMIN — ATORVASTATIN CALCIUM 20 MG: 20 TABLET, FILM COATED ORAL at 16:41

## 2025-03-23 RX ADMIN — LINEZOLID 600 MG: 600 TABLET, FILM COATED ORAL at 07:38

## 2025-03-23 RX ADMIN — SODIUM CHLORIDE, POTASSIUM CHLORIDE, SODIUM LACTATE AND CALCIUM CHLORIDE: 600; 310; 30; 20 INJECTION, SOLUTION INTRAVENOUS at 02:08

## 2025-03-23 RX ADMIN — CEFTRIAXONE SODIUM 2000 MG: 10 INJECTION, POWDER, FOR SOLUTION INTRAVENOUS at 16:40

## 2025-03-23 RX ADMIN — NYSTATIN: 100000 POWDER TOPICAL at 20:29

## 2025-03-23 RX ADMIN — OXYCODONE 5 MG: 5 TABLET ORAL at 04:11

## 2025-03-23 RX ADMIN — DULOXETINE 30 MG: 30 CAPSULE, DELAYED RELEASE ORAL at 14:05

## 2025-03-23 RX ADMIN — LINEZOLID 600 MG: 600 TABLET, FILM COATED ORAL at 20:29

## 2025-03-23 ASSESSMENT — ENCOUNTER SYMPTOMS
BLOOD IN STOOL: 0
SHORTNESS OF BREATH: 0
EYE PAIN: 0
VOMITING: 0
FEVER: 0
MEMORY LOSS: 1
CONSTIPATION: 0
CHILLS: 0
BACK PAIN: 0
ABDOMINAL PAIN: 0
DIARRHEA: 0
NAUSEA: 0
NECK PAIN: 0
MYALGIAS: 1

## 2025-03-23 ASSESSMENT — PAIN DESCRIPTION - PAIN TYPE
TYPE: ACUTE PAIN

## 2025-03-23 NOTE — CARE PLAN
The patient is Watcher - Medium risk of patient condition declining or worsening    Shift Goals  Clinical Goals: Safety  Patient Goals: Rest  Family Goals: none present    Progress made toward(s) clinical / shift goals: Patient has remained safe during this shift    Problem: Pain - Standard  Goal: Alleviation of pain or a reduction in pain to the patient’s comfort goal  Outcome: Progressing  Note: Reported pain using 0 - 10 pain scale, Pain management medications administered as ordered      Problem: Skin Integrity  Goal: Skin integrity is maintained or improved  Outcome: Progressing  Note: Schulte inserted today to prevent further breakdown from incontinence        Patient is not progressing towards the following goals:

## 2025-03-23 NOTE — CARE PLAN
The patient is Watcher - Medium risk of patient condition declining or worsening    Shift Goals  Clinical Goals: pain control  Patient Goals: rest  Family Goals: none present    Progress made toward(s) clinical / shift goals:     Problem: Respiratory  Goal: Patient will achieve/maintain optimum respiratory ventilation and gas exchange  Outcome: Progressing     Problem: Fall Risk  Goal: Patient will remain free from falls  Outcome: Progressing       Patient is not progressing towards the following goals:

## 2025-03-23 NOTE — PROGRESS NOTES
4 Eyes Skin Assessment Completed by Angelita Irizarry, RN and Itzel Crystal RN.     Head WDL  Ears Redness and Blanching  Nose WDL  Mouth WDL  Neck WDL  Breast/Chest WDL  Shoulder Blades WDL  Spine WDL  (R) Arm/Elbow/Hand Redness and Blanching  (L) Arm/Elbow/Hand Redness and Blanching  Abdomen WDL, ostomy, right panus rash     Groin Redness, Excoriation, and Swelling    Scrotum/Coccyx/Buttocks Redness and Excoriation    (R) Leg Redness, Scab, Bruising, Swelling, Abrasion, Shiny, Weeping, and Edema    (L) Leg Redness, Scab, Bruising, Swelling, Abrasion, Weeping, and Edema    (R) Heel/Foot/Toe Redness and Blanching, missing second digit   (L) Heel/Foot/Toe Redness and Blanching, missing second digit               Devices In Places Condom Cath, pulse Ox        Interventions In Place Heel Mepilex, Heel Float Boots, TAP System, Pillows, Elbow Mepilex, Q2 Turns, Low Air Loss Mattress, Barrier Cream, and Heels Loaded W/Pillows     Possible Skin Injury Yes     Pictures Uploaded Into Epic Yes  Wound Consult Placed Yes  RN Wound Prevention Protocol Ordered Yes

## 2025-03-23 NOTE — PROGRESS NOTES
[facility] Medicine Daily Progress Note    Date of Service  3/23/2025    Chief Complaint  Sebastian Sinha is a 74 y.o. male admitted 3/20/2025 with ALOC (Pt w ALOC for 48 hrs per staff at [facility]. Pt w hx of cystitis and cellulitis to RLE)        Hospital Course  No notes on file    Interval Problem Update    JOSE ANTONIO ON  He indicates that he got up this morning to the restroom.  He is irritated with all the alarms and difficulty sleeping.  Per primary RN he has been bedbound and was soiled of urine this morning causing irritation of his skin.  Diverting aguiar catheter placed.  He does not recall being at [facility].  He reports pain in his legs.  He reports neuropathy and is agreeable to SNRI monotherapy.  He denies bleeding, dyspnea, chest pain, F/C, N/V.    CBC reviewed, Hgb stable 10.2.  RFP reviewed, Cr stable 1.6.    I have discussed this patient's plan of care and discharge plan at IDT rounds today with Case Management, Nursing, Nursing leadership, and other members of the IDT team.    Consultants/Specialty      Code Status  DNAR/DNI    Disposition  The patient is not medically cleared for discharge to home or a post-acute facility.  Anticipate discharge to: skilled nursing facility    I have placed the appropriate orders for post-discharge needs.    Review of Systems  Review of Systems   Constitutional:  Negative for chills and fever.   Eyes:  Negative for pain.   Respiratory:  Negative for shortness of breath.    Cardiovascular:  Negative for chest pain.   Gastrointestinal:  Negative for abdominal pain, blood in stool, constipation, diarrhea, melena, nausea and vomiting.   Genitourinary:  Negative for dysuria, frequency, hematuria and urgency.   Musculoskeletal:  Positive for myalgias. Negative for back pain, joint pain and neck pain.   Psychiatric/Behavioral:  Positive for memory loss.         Physical Exam  Temp:  [36.1 °C (96.9 °F)-36.6 °C (97.8 °F)] 36.2 °C (97.2 °F)  Pulse:  [] 81  Resp:  [18-22]  20  BP: (127-157)/(72-87) 131/87  SpO2:  [96 %-97 %] 97 %    Physical Exam  Vitals and nursing note reviewed.   Constitutional:       General: He is not in acute distress.     Appearance: He is obese. He is ill-appearing (Chronically). He is not toxic-appearing or diaphoretic.   HENT:      Head: Normocephalic.      Nose: Nose normal.      Mouth/Throat:      Mouth: Mucous membranes are moist.   Eyes:      General: No scleral icterus.     Conjunctiva/sclera: Conjunctivae normal.   Cardiovascular:      Rate and Rhythm: Normal rate and regular rhythm.      Pulses: Normal pulses.      Heart sounds: Normal heart sounds. No murmur heard.     No friction rub. No gallop.   Pulmonary:      Effort: Pulmonary effort is normal. No respiratory distress.      Breath sounds: Normal breath sounds. No wheezing, rhonchi or rales.   Abdominal:      General: Abdomen is flat. Bowel sounds are normal. There is no distension.      Palpations: Abdomen is soft.      Tenderness: There is no abdominal tenderness. There is no guarding or rebound.   Genitourinary:     Comments: No aguiar  Musculoskeletal:      Cervical back: Neck supple.      Right lower leg: Edema present.      Left lower leg: Edema present.      Comments: 2+ pretibial edema   Skin:     General: Skin is warm.      Comments: BLE feet wrapped in foam dressings. LLE wrapped in gauze. Diffuse xerosis of BLE with blanching nontender erythema.   Neurological:      Mental Status: He is alert.      Motor: Weakness present.      Comments: Appropriately conversant, moves all extremities   Psychiatric:         Attention and Perception: Attention and perception normal.         Mood and Affect: Mood and affect normal.         Speech: Speech is delayed.         Behavior: Behavior is slowed. Behavior is cooperative.         Thought Content: Thought content normal.         Cognition and Memory: He exhibits impaired recent memory and impaired remote memory.         Judgment: Judgment normal.          Fluids    Intake/Output Summary (Last 24 hours) at 3/23/2025 1259  Last data filed at 3/23/2025 1000  Gross per 24 hour   Intake 480 ml   Output 2450 ml   Net -1970 ml        Laboratory  Recent Labs     03/21/25  0611 03/23/25  0117   WBC 5.2 7.4   RBC 4.36* 3.72*   HEMOGLOBIN 12.0* 10.2*   HEMATOCRIT 38.0* 32.2*   MCV 87.2 86.6   MCH 27.5 27.4   MCHC 31.6* 31.7*   RDW 50.4* 50.9*   PLATELETCT 236 256   MPV 10.5 10.8     Recent Labs     03/21/25  0611 03/22/25  1121 03/23/25  0117   SODIUM 142 139 140   POTASSIUM 4.1 4.2 4.3   CHLORIDE 108 106 107   CO2 19* 22 21   GLUCOSE 100* 140* 108*   BUN 24* 17 15   CREATININE 1.83* 1.61* 1.59*   CALCIUM 9.1 8.5 8.4*                   Imaging  DX-TIBIA AND FIBULA LEFT   Final Result         1. No left tibiofibular fracture.      2. Mild ankle and knee degenerative changes.                        CT-HEAD W/O   Final Result      1.  No evidence of acute territorial infarct, intracranial hemorrhage or mass lesion.   2.  Mild diffuse cerebral substance loss.   3.  Mild microangiopathic ischemic change versus demyelination or gliosis.               DX-CHEST-PORTABLE (1 VIEW)   Final Result      No acute cardiopulmonary disease evident.           Assessment/Plan  * Cellulitis of left lower extremity- (present on admission)  Assessment & Plan  Left lower extremity cellulitis with extensive skin breakdown and necrosis  Wound cultures positive for E coli, Klebsiella, and MRSA  Continue ceftriaxone and linezolid per susceptibilities to complete 5-day course  Wound care consulted    Metabolic acidosis- (present on admission)  Assessment & Plan  Due to CKD  Continue sodium bicarb  Repeat AM BMP    Type 2 diabetes mellitus with diabetic polyneuropathy, without long-term current use of insulin (HCC)- (present on admission)  Assessment & Plan  He is on metformin and sitagliptin as an outpatient  Recent hemoglobin A1c 3 weeks ago was 9  Metformin will be held due to renal  impairment  Continue duloxetine for neuropathy  Continue atorvastatin    Paroxysmal atrial fibrillation (HCC)- (present on admission)  Assessment & Plan  Continue apixaban    Acute kidney injury superimposed on chronic kidney disease (HCC)- (present on admission)  Assessment & Plan  Creatinine 3 weeks ago was 1.2 currently up to 2.23  UA positive for granula casts consistent with ATN from sepsis  Repeat AM BMP    Acute metabolic encephalopathy- (present on admission)  Assessment & Plan  Improving  Secondary to infection  Antibiotics, fluids intravenously, supportive care    Primary hypertension- (present on admission)  Assessment & Plan  Holding antihypertensives in setting of infection    Metastatic lung cancer (metastasis from lung to other site), right (HCC)- (present on admission)  Assessment & Plan  History of       VTE prophylaxis:    therapeutic anticoagulation with eliquis 5 mg BID    I have performed a physical exam and reviewed and updated ROS and Plan today (3/23/2025). In review of yesterday's note (3/22/2025), there are no changes except as documented above.

## 2025-03-24 VITALS
DIASTOLIC BLOOD PRESSURE: 75 MMHG | HEART RATE: 65 BPM | SYSTOLIC BLOOD PRESSURE: 123 MMHG | TEMPERATURE: 97.5 F | RESPIRATION RATE: 18 BRPM | HEIGHT: 70 IN | BODY MASS INDEX: 30.06 KG/M2 | WEIGHT: 210 LBS | OXYGEN SATURATION: 99 %

## 2025-03-24 LAB
ANION GAP SERPL CALC-SCNC: 11 MMOL/L (ref 7–16)
BUN SERPL-MCNC: 12 MG/DL (ref 8–22)
CALCIUM SERPL-MCNC: 8.2 MG/DL (ref 8.5–10.5)
CHLORIDE SERPL-SCNC: 106 MMOL/L (ref 96–112)
CO2 SERPL-SCNC: 23 MMOL/L (ref 20–33)
CREAT SERPL-MCNC: 1.55 MG/DL (ref 0.5–1.4)
ERYTHROCYTE [DISTWIDTH] IN BLOOD BY AUTOMATED COUNT: 50.6 FL (ref 35.9–50)
GFR SERPLBLD CREATININE-BSD FMLA CKD-EPI: 47 ML/MIN/1.73 M 2
GLUCOSE SERPL-MCNC: 161 MG/DL (ref 65–99)
HCT VFR BLD AUTO: 31.8 % (ref 42–52)
HGB BLD-MCNC: 10.1 G/DL (ref 14–18)
MCH RBC QN AUTO: 27.2 PG (ref 27–33)
MCHC RBC AUTO-ENTMCNC: 31.8 G/DL (ref 32.3–36.5)
MCV RBC AUTO: 85.5 FL (ref 81.4–97.8)
PLATELET # BLD AUTO: 260 K/UL (ref 164–446)
PMV BLD AUTO: 10.8 FL (ref 9–12.9)
POTASSIUM SERPL-SCNC: 4.1 MMOL/L (ref 3.6–5.5)
RBC # BLD AUTO: 3.72 M/UL (ref 4.7–6.1)
SODIUM SERPL-SCNC: 140 MMOL/L (ref 135–145)
WBC # BLD AUTO: 9.3 K/UL (ref 4.8–10.8)

## 2025-03-24 PROCEDURE — A9270 NON-COVERED ITEM OR SERVICE: HCPCS | Performed by: STUDENT IN AN ORGANIZED HEALTH CARE EDUCATION/TRAINING PROGRAM

## 2025-03-24 PROCEDURE — 700102 HCHG RX REV CODE 250 W/ 637 OVERRIDE(OP): Performed by: STUDENT IN AN ORGANIZED HEALTH CARE EDUCATION/TRAINING PROGRAM

## 2025-03-24 PROCEDURE — 700102 HCHG RX REV CODE 250 W/ 637 OVERRIDE(OP): Performed by: INTERNAL MEDICINE

## 2025-03-24 PROCEDURE — 99239 HOSP IP/OBS DSCHRG MGMT >30: CPT | Performed by: STUDENT IN AN ORGANIZED HEALTH CARE EDUCATION/TRAINING PROGRAM

## 2025-03-24 PROCEDURE — 700105 HCHG RX REV CODE 258: Performed by: HOSPITALIST

## 2025-03-24 PROCEDURE — 80048 BASIC METABOLIC PNL TOTAL CA: CPT

## 2025-03-24 PROCEDURE — A9270 NON-COVERED ITEM OR SERVICE: HCPCS | Performed by: INTERNAL MEDICINE

## 2025-03-24 PROCEDURE — A9270 NON-COVERED ITEM OR SERVICE: HCPCS

## 2025-03-24 PROCEDURE — 36415 COLL VENOUS BLD VENIPUNCTURE: CPT

## 2025-03-24 PROCEDURE — 85027 COMPLETE CBC AUTOMATED: CPT

## 2025-03-24 PROCEDURE — 700102 HCHG RX REV CODE 250 W/ 637 OVERRIDE(OP)

## 2025-03-24 RX ORDER — LINEZOLID 600 MG/1
600 TABLET, FILM COATED ORAL 2 TIMES DAILY
Status: ACTIVE | DISCHARGE
Start: 2025-03-24 | End: 2025-03-25

## 2025-03-24 RX ORDER — OXYCODONE HYDROCHLORIDE 5 MG/1
5 TABLET ORAL EVERY 6 HOURS PRN
Qty: 10 TABLET | Refills: 0
Start: 2025-03-24 | End: 2025-03-24

## 2025-03-24 RX ORDER — OXYCODONE HYDROCHLORIDE 5 MG/1
5 TABLET ORAL EVERY 6 HOURS PRN
Qty: 10 TABLET | Refills: 0 | Status: SHIPPED | OUTPATIENT
Start: 2025-03-24 | End: 2025-03-27

## 2025-03-24 RX ORDER — LANOLIN ALCOHOL/MO/W.PET/CERES
100 CREAM (GRAM) TOPICAL DAILY
Status: SHIPPED
Start: 2025-03-25

## 2025-03-24 RX ORDER — SODIUM BICARBONATE 650 MG/1
650 TABLET ORAL 2 TIMES DAILY WITH MEALS
Status: SHIPPED
Start: 2025-03-24

## 2025-03-24 RX ORDER — NYSTATIN 100000 [USP'U]/G
1 POWDER TOPICAL 2 TIMES DAILY
Status: SHIPPED
Start: 2025-03-24 | End: 2025-03-30

## 2025-03-24 RX ADMIN — APIXABAN 5 MG: 5 TABLET, FILM COATED ORAL at 04:22

## 2025-03-24 RX ADMIN — SODIUM CHLORIDE, POTASSIUM CHLORIDE, SODIUM LACTATE AND CALCIUM CHLORIDE: 600; 310; 30; 20 INJECTION, SOLUTION INTRAVENOUS at 02:54

## 2025-03-24 RX ADMIN — SODIUM BICARBONATE 650 MG: 650 TABLET ORAL at 09:03

## 2025-03-24 RX ADMIN — DULOXETINE 30 MG: 30 CAPSULE, DELAYED RELEASE ORAL at 04:22

## 2025-03-24 RX ADMIN — OXYCODONE 2.5 MG: 5 TABLET ORAL at 04:20

## 2025-03-24 RX ADMIN — OXYCODONE 5 MG: 5 TABLET ORAL at 13:50

## 2025-03-24 RX ADMIN — NYSTATIN: 100000 POWDER TOPICAL at 04:22

## 2025-03-24 RX ADMIN — OXYCODONE 2.5 MG: 5 TABLET ORAL at 09:03

## 2025-03-24 RX ADMIN — THERA TABS 1 TABLET: TAB at 04:22

## 2025-03-24 RX ADMIN — LINEZOLID 600 MG: 600 TABLET, FILM COATED ORAL at 09:03

## 2025-03-24 RX ADMIN — Medication 100 MG: at 04:22

## 2025-03-24 ASSESSMENT — PAIN DESCRIPTION - PAIN TYPE
TYPE: ACUTE PAIN

## 2025-03-24 NOTE — DISCHARGE PLANNING
DC Transport Scheduled    Transport Company Scheduled:  [employer]  Spoke with Mervat at [facility] to schedule transport.    Scheduled Date: 3/24/2025  Scheduled Time: 1400    Transport Type: [place]  [facility]   [address]     Notified care team of scheduled transport via [employer].     If there are any changes needed to the DC transportation scheduled, please contact [facility] at ext. [phone] between the hours of 2892-6128. If outside those hours, contact the ED Case Manager at ext. [phone].

## 2025-03-24 NOTE — CARE PLAN
The patient is Watcher - Medium risk of patient condition declining or worsening    Shift Goals  Clinical Goals: Safety  Patient Goals: Rest  Family Goals: none present    Progress made toward(s) clinical / shift goals:    Problem: Pain - Standard  Goal: Alleviation of pain or a reduction in pain to the patient’s comfort goal  Outcome: Progressing     Problem: Knowledge Deficit - Standard  Goal: Patient and family/care givers will demonstrate understanding of plan of care, disease process/condition, diagnostic tests and medications  Outcome: Progressing     Problem: Skin Integrity  Goal: Skin integrity is maintained or improved  Outcome: Progressing       Patient is not progressing towards the following goals:

## 2025-03-24 NOTE — PROGRESS NOTES
RN informed Dr Chris Diaz about 5mg Apixaban dose while patient left leg is actively bleeding, as well as new hematuria. Per Dr. Diaz ok to give Apixaban dose until further notice.

## 2025-03-24 NOTE — PROGRESS NOTES
4 Eyes Skin Assessment Completed by Kristal CLARKE RN and Ginette BROWN RN.    Head WDL  Ears Redness and Blanching  Nose WDL  Mouth WDL  Neck WDL  Breast/Chest WDL  Shoulder Blades WDL  Spine WDL  (R) Arm/Elbow/Hand Redness and Blanching  (L) Arm/Elbow/Hand Redness and Blanching  Abdomen Rash right pannus, Ostomy    Groin Redness, Excoriation, and Swelling    Scrotum/Coccyx/Buttocks Redness and Excoriation    (R) Leg Redness, Scab, Bruising, Swelling, Abrasion, Shiny, Weeping, and Edema    (L) Leg Redness, Scab, Bruising, Swelling, Abrasion, Weeping, and Edema    (R) Heel/Foot/Toe Redness and Blanching, Missing second digit  (L) Heel/Foot/Toe Redness and Blanching, Missing second digit          Devices In Places Pulse Ox and Schulte      Interventions In Place Heel Mepilex, Heel Float Boots, TAP System, Pillows, Elbow Mepilex, Q2 Turns, Low Air Loss Mattress, Barrier Cream, and Heels Loaded W/Pillows    Possible Skin Injury Yes    Pictures Uploaded Into Epic Yes  Wound Consult Placed Yes  RN Wound Prevention Protocol Ordered Yes

## 2025-03-24 NOTE — DISCHARGE PLANNING
0929  RACHEL spoek with Cat ([facility]) to confirm can accept pt today. Cat says yes, can transport pt at 1400. Advised NATHANAEL Goss.

## 2025-03-24 NOTE — DISCHARGE PLANNING
Case Management Discharge Planning    Admission Date: 3/20/2025  GMLOS: 4.6  ALOS: 4    6-Clicks ADL Score: 8  6-Clicks Mobility Score: 6        Anticipated Discharge Dispo: Discharge Disposition: D/T to SNF with Medicare cert in anticipation of skilled care (03)    DME Needed: No    Action(s) Taken: Pt medically cleared to [facility] time today. Requested transport for 1400 via [employer]. Pt agreeable to return to [facility]. COBRA packet complete and given to RN.    Escalations Completed: None    Medically Clear: Yes

## 2025-03-24 NOTE — DISCHARGE SUMMARY
Discharge Summary    CHIEF COMPLAINT ON ADMISSION  Chief Complaint   Patient presents with    ALOC     Pt w ALOC for 48 hrs per staff at [facility]. Pt w hx of cystitis and cellulitis to RLE       Reason for Admission  EMS    Admission Date  3/20/2025    Discharge Date  3/24/2025     CODE STATUS  DNAR/DNI    HPI & HOSPITAL COURSE  This is a 74 y.o. male with T2DM complicated by neuropathy, CKD2, Afib on apixaban, and recent E coli bacteremia from urinary source s/p aguiar here with metabolic encephalopathy and LLE cellulitis.     He returned to [facility] from [facility] due to acute encephalopathy. He did not meet SIRS criteria on presentation. He had apparent cellulitis of the LLE for which he was initiated on empiric antibiotics with  ceftriaxone and linezolid due to prior cefazolin-resistant E coli. BMP notable for Cr 2.2, which indicated ARMANI on CKD for which he was initiated on IVF. UA was positive for granular casts indicative of ATN from infection. Wound cultures were positive for ceftriaxone-intermediate E coli, ceftriaxone-susceptible Klebsiella, and MRSA. Blood cultures are no growth to date. He had improvement in his encephalopathy though course complicated by hypoactive delirium. Cr improved and stabilized at 1.6 without indication for RRT. He was initiated on sodium bicarbonate for metabolic acidosis. He will complete his antibiotic course for 5-days 3/24/25. Wound care was consulted and provided expertise.    Therefore, he is discharged in fair and stable condition to skilled nursing facility.    The patient met 2-midnight criteria for an inpatient stay at the time of discharge.    FOLLOW UP ITEMS POST DISCHARGE    Cellulitis - complete 5-day course 3/24/25, ongoing wound care    DISCHARGE DIAGNOSES  Principal Problem:    Cellulitis of left lower extremity (POA: Yes)  Active Problems:    Metastatic lung cancer (metastasis from lung to other site), right (HCC) (POA: Yes)    Primary hypertension (POA:  Yes)    Acute metabolic encephalopathy (POA: Yes)    Acute kidney injury superimposed on chronic kidney disease (HCC) (POA: Yes)    Paroxysmal atrial fibrillation (HCC) (POA: Yes)    Type 2 diabetes mellitus with diabetic polyneuropathy, without long-term current use of insulin (HCC) (POA: Yes)    Metabolic acidosis (POA: Yes)  Resolved Problems:    * No resolved hospital problems. *      FOLLOW UP  No future appointments.  [facility]  [address] [facility] [address]  [phone]          MEDICATIONS ON DISCHARGE     Medication List        START taking these medications        Instructions   cefTRIAXone 2 g/20 mL  Commonly known as: Rocephin   Infuse 20 mL into a venous catheter one time for 1 dose. 1800 3/24/25  Dose: 2,000 mg     linezolid 600 MG Tabs  Commonly known as: Zyvox   Take 1 Tablet by mouth 2 times a day for 1 day. Last dose 1800 3/24/25  Dose: 600 mg     multivitamin Tabs  Start taking on: March 25, 2025   Take 1 Tablet by mouth every day.  Dose: 1 Tablet     nystatin powder  Commonly known as: Mycostatin   Apply 1 g topically 2 times a day for 6 days.  Dose: 1 Application     sodium bicarbonate 650 MG Tabs  Commonly known as: Sodium Bicarbonate   Take 1 Tablet by mouth 2 times a day with meals.  Dose: 650 mg     thiamine 100 MG tablet  Start taking on: March 25, 2025  Commonly known as: Thiamine   Take 1 Tablet by mouth every day.  Dose: 100 mg            CHANGE how you take these medications        Instructions   oxyCODONE immediate-release 5 MG Tabs  What changed:   when to take this  reasons to take this  Commonly known as: Roxicodone   Take 1 Tablet by mouth every 6 hours as needed for Severe Pain for up to 3 days.  Dose: 5 mg            CONTINUE taking these medications        Instructions   acetaminophen 500 MG Tabs  Commonly known as: Tylenol   Take 1,000 mg by mouth every 8 hours as needed for Mild Pain. 2 tablets = 1,000 mg.  Dose: 1,000 mg      atorvastatin 20 MG Tabs  Commonly known as: Lipitor   Take 20 mg by mouth every evening.  Dose: 20 mg     baclofen 5 MG Tabs  Commonly known as: Lioresal   Take 2.5 mg by mouth 3 times a day as needed (Muscle Spasms). 1/2 tablet = 2.5 mg.  Dose: 2.5 mg     cyanocobalamin 500 MCG Tabs  Commonly known as: Vitamin B-12   Take 1,000 mcg by mouth every day.  Dose: 1,000 mcg     diclofenac sodium 1 % Gel  Commonly known as: Voltaren   Apply 2 g topically 4 times a day as needed (Joint Pain).  Dose: 2 g     DULoxetine 30 MG Cpep  Commonly known as: Cymbalta   Take 30 mg by mouth every day.  Dose: 30 mg     Eliquis 5mg Tabs  Generic drug: apixaban   Take 5 mg by mouth 2 times a day.  Dose: 5 mg     lidocaine 4 % Ptch  Commonly known as: Asperflex   Place 1 Patch on the skin every 24 hours. 12 hours on, 12 hours off.  Dose: 1 Patch     metformin 1000 MG tablet  Commonly known as: Glucophage   Take 1,000 mg by mouth 2 times a day with meals.  Dose: 1,000 mg     miconazole 2 % powder  Commonly known as: Micotin   Apply 1 Application topically 2 times a day. Apply to groin.  Dose: 1 Application     pregabalin 150 MG Caps  Commonly known as: Lyrica   Take 1 Capsule by mouth 2 times a day for 30 days.  Dose: 150 mg     PROSTAT PO   Take 1 Each by mouth 2 times a day.  Dose: 1 Each     tamsulosin 0.4 MG capsule  Commonly known as: Flomax   Take 0.4 mg by mouth every day. Take 30 minutes after the same meal each day.  Dose: 0.4 mg     triamcinolone acetonide 0.1 % Crea  Commonly known as: Kenalog   Apply 1 Application topically every day. Use as directed for wound care.  Dose: 1 Application     Zituvio 100 MG Tabs  Generic drug: SITagliptin   Take 100 mg by mouth every day.  Dose: 100 mg            STOP taking these medications      sulfamethoxazole-trimethoprim 800-160 MG tablet  Commonly known as: Bactrim DS     venlafaxine XR 37.5 MG Cp24  Commonly known as: Effexor XR              Allergies  Allergies   Allergen Reactions     "Gabapentin Unspecified     \"Dizziness\"  On MAR from pharmacy        DIET  Orders Placed This Encounter   Procedures    Diet Order Diet: Consistent CHO (Diabetic)     Standing Status:   Standing     Number of Occurrences:   1     Diet::   Consistent CHO (Diabetic) [4]       ACTIVITY  As tolerated and directed by skilled nursing.  Weight bearing as tolerated    LINES, DRAINS, AND WOUNDS  This is an automated list. Peripheral IVs will be removed prior to discharge.  Peripheral IV 03/20/25 20 G Anterior;Distal;Left Upper arm (Active)   Site Assessment Bleeding 03/23/25 2030   Dressing Type Occlusive;Transparent 03/23/25 2030   Line Status Blood return noted;Flushed;Infusing;Scrubbed the hub prior to access 03/23/25 2030   Dressing Status Clean;Dry;Intact 03/23/25 2030   Dressing Intervention N/A 03/23/25 2030   Dressing Change Due 03/22/25 03/22/25 1552   Date Primary Tubing Changed 03/22/25 03/23/25 0815   NEXT Primary Tubing Change  03/29/25 03/23/25 0815   Infiltration Grading ([facility]) 0 03/23/25 0815   Phlebitis Scale ([facility] Only) 0 03/23/25 0815     External Urinary Catheter  (Active)   Collection Container Standard drainage bag 03/23/25 2030   Output (mL) 200 mL 03/24/25 0546      Wound 02/22/25 Venous Ulcer Leg Anterior;Lateral Left (Active)   Wound Image   03/21/25 1000   Site Assessment DIANNA 03/23/25 2030   Periwound Assessment DIANNA 03/23/25 2030   Margins DIANNA 03/23/25 2030   Closure DIANNA 03/23/25 2030   Drainage Amount Small 03/21/25 1000   Drainage Description Sanguineous 03/21/25 1000   Treatments Offloading 03/23/25 2030   Offloading/DME Heel float boot 03/23/25 2030   Wound Cleansing Approved Wound Cleanser 03/21/25 1000   Periwound Protectant Skin Protectant Wipes to Periwound 03/21/25 1000   Dressing Status Clean;Dry;Intact 03/23/25 2030   Dressing Changed Observed 03/22/25 1552   Dressing Cleansing/Solutions Not Applicable 03/21/25 1000   Dressing Options Absorbent Abdominal Pad;Dry Roll Gauze " 03/21/25 2028   Dressing Change/Treatment Frequency Every 72 hrs, and As Needed 03/23/25 2030   NEXT Dressing Change/Treatment Date 03/25/25 03/23/25 2030   NEXT Weekly Photo (Inpatient Only) 03/26/25 03/23/25 2030   Wound Team Following Weekly 03/22/25 2036   Non-staged Wound Description Full thickness 03/21/25 1000   Shape irregular 03/21/25 1000   Exposed Structures None 03/21/25 1000   WOUND NURSE ONLY - Time Spent with Patient (mins) 45 03/21/25 1000       Wound 02/22/25 Pressure Injury Sacrum;Coccyx sDTI POA (Active)   Wound Image   03/20/25 1516   Site Assessment DIANNA 03/23/25 2030   Periwound Assessment DIANNA 03/23/25 2030   Margins DIANNA 03/23/25 2030   Closure DIANNA 03/23/25 2030   Drainage Amount DIANNA 03/23/25 2030   Drainage Description Serosanguineous 03/20/25 2000   Treatments Offloading 03/23/25 2030   Offloading/DME Other (comment) 03/23/25 2030   Periwound Protectant Barrier Paste 03/23/25 2030   Dressing Status Open to Air 03/23/25 2030   NEXT Weekly Photo (Inpatient Only) 03/26/25 03/23/25 2030       Wound 02/22/25 Pressure Injury Left sDTI POA (Active)       Peripheral IV 03/20/25 20 G Anterior;Distal;Left Upper arm (Active)   Site Assessment Bleeding 03/23/25 2030   Dressing Type Occlusive;Transparent 03/23/25 2030   Line Status Blood return noted;Flushed;Infusing;Scrubbed the hub prior to access 03/23/25 2030   Dressing Status Clean;Dry;Intact 03/23/25 2030   Dressing Intervention N/A 03/23/25 2030   Dressing Change Due 03/22/25 03/22/25 1552   Date Primary Tubing Changed 03/22/25 03/23/25 0815   NEXT Primary Tubing Change  03/29/25 03/23/25 0815   Infiltration Grading ([facility]) 0 03/23/25 0815   Phlebitis Scale ([facility] Only) 0 03/23/25 0815               MENTAL STATUS ON TRANSFER     Awake, oriented to self and location, maintains decisional capacity     CONSULTATIONS  None    PROCEDURES  None    LABORATORY  Lab Results   Component Value Date    SODIUM 140 03/24/2025    POTASSIUM 4.1 03/24/2025     CHLORIDE 106 03/24/2025    CO2 23 03/24/2025    GLUCOSE 161 (H) 03/24/2025    BUN 12 03/24/2025    CREATININE 1.55 (H) 03/24/2025        Lab Results   Component Value Date    WBC 9.3 03/24/2025    HEMOGLOBIN 10.1 (L) 03/24/2025    HEMATOCRIT 31.8 (L) 03/24/2025    PLATELETCT 260 03/24/2025        Total time of the discharge process exceeds 36 minutes.

## 2025-03-25 LAB
BACTERIA BLD CULT: NORMAL
SIGNIFICANT IND 70042: NORMAL
SITE SITE: NORMAL
SOURCE SOURCE: NORMAL

## 2025-06-08 ENCOUNTER — APPOINTMENT (OUTPATIENT)
Dept: RADIOLOGY | Facility: MEDICAL CENTER | Age: 75
DRG: 871 | End: 2025-06-08
Payer: COMMERCIAL

## 2025-06-08 ENCOUNTER — HOSPITAL ENCOUNTER (INPATIENT)
Facility: MEDICAL CENTER | Age: 75
LOS: 4 days | DRG: 871 | End: 2025-06-12
Attending: EMERGENCY MEDICINE | Admitting: INTERNAL MEDICINE
Payer: COMMERCIAL

## 2025-06-08 DIAGNOSIS — I48.91 ATRIAL FIBRILLATION WITH RAPID VENTRICULAR RESPONSE (HCC): Primary | ICD-10-CM

## 2025-06-08 DIAGNOSIS — L03.116 CELLULITIS OF LEFT LOWER EXTREMITY: ICD-10-CM

## 2025-06-08 DIAGNOSIS — R78.81 BACTEREMIA, ESCHERICHIA COLI: ICD-10-CM

## 2025-06-08 DIAGNOSIS — R65.20 SEVERE SEPSIS (HCC): ICD-10-CM

## 2025-06-08 DIAGNOSIS — Z87.828 H/O OPEN LEG WOUND: ICD-10-CM

## 2025-06-08 DIAGNOSIS — E87.20 LACTIC ACIDOSIS: ICD-10-CM

## 2025-06-08 DIAGNOSIS — B96.20 BACTEREMIA, ESCHERICHIA COLI: ICD-10-CM

## 2025-06-08 DIAGNOSIS — A41.9 SEVERE SEPSIS (HCC): ICD-10-CM

## 2025-06-08 DIAGNOSIS — R41.0 CONFUSION: ICD-10-CM

## 2025-06-08 PROBLEM — R79.89 ELEVATED TROPONIN: Status: ACTIVE | Noted: 2025-06-08

## 2025-06-08 PROBLEM — Z78.9 FULL CODE STATUS: Status: ACTIVE | Noted: 2025-06-08

## 2025-06-08 PROBLEM — S81.802A OPEN WOUND OF BOTH LOWER EXTREMITIES: Status: ACTIVE | Noted: 2025-06-08

## 2025-06-08 PROBLEM — S81.801A OPEN WOUND OF BOTH LOWER EXTREMITIES: Status: ACTIVE | Noted: 2025-06-08

## 2025-06-08 LAB
ALBUMIN SERPL BCP-MCNC: 2.6 G/DL (ref 3.2–4.9)
ALBUMIN/GLOB SERPL: 0.6 G/DL
ALP SERPL-CCNC: 142 U/L (ref 30–99)
ALT SERPL-CCNC: 39 U/L (ref 2–50)
ANION GAP SERPL CALC-SCNC: 18 MMOL/L (ref 7–16)
APPEARANCE UR: ABNORMAL
AST SERPL-CCNC: 49 U/L (ref 12–45)
BACTERIA #/AREA URNS HPF: ABNORMAL /HPF
BASOPHILS # BLD AUTO: 0 % (ref 0–1.8)
BASOPHILS # BLD: 0 K/UL (ref 0–0.12)
BILIRUB SERPL-MCNC: 0.6 MG/DL (ref 0.1–1.5)
BILIRUB UR QL STRIP.AUTO: NEGATIVE
BUN SERPL-MCNC: 22 MG/DL (ref 8–22)
BURR CELLS BLD QL SMEAR: NORMAL
CALCIUM ALBUM COR SERPL-MCNC: 9.9 MG/DL (ref 8.5–10.5)
CALCIUM SERPL-MCNC: 8.8 MG/DL (ref 8.5–10.5)
CASTS URNS QL MICRO: ABNORMAL /LPF (ref 0–2)
CHLORIDE SERPL-SCNC: 100 MMOL/L (ref 96–112)
CO2 SERPL-SCNC: 18 MMOL/L (ref 20–33)
COLOR UR: ABNORMAL
COMMENT NL1176: NORMAL
CREAT SERPL-MCNC: 1.4 MG/DL (ref 0.5–1.4)
EKG IMPRESSION: NORMAL
EKG IMPRESSION: NORMAL
EOSINOPHIL # BLD AUTO: 0 K/UL (ref 0–0.51)
EOSINOPHIL NFR BLD: 0 % (ref 0–6.9)
EPITHELIAL CELLS 1715: ABNORMAL /HPF (ref 0–5)
ERYTHROCYTE [DISTWIDTH] IN BLOOD BY AUTOMATED COUNT: 52.2 FL (ref 35.9–50)
FLUAV RNA SPEC QL NAA+PROBE: NEGATIVE
FLUBV RNA SPEC QL NAA+PROBE: NEGATIVE
GFR SERPLBLD CREATININE-BSD FMLA CKD-EPI: 53 ML/MIN/1.73 M 2
GLOBULIN SER CALC-MCNC: 4.7 G/DL (ref 1.9–3.5)
GLUCOSE BLD STRIP.AUTO-MCNC: 143 MG/DL (ref 65–99)
GLUCOSE SERPL-MCNC: 173 MG/DL (ref 65–99)
GLUCOSE UR STRIP.AUTO-MCNC: NEGATIVE MG/DL
HCT VFR BLD AUTO: 32.1 % (ref 42–52)
HGB BLD-MCNC: 9.9 G/DL (ref 14–18)
HYALINE CAST   1831: PRESENT /LPF
KETONES UR STRIP.AUTO-MCNC: 15 MG/DL
LACTATE SERPL-SCNC: 1.5 MMOL/L (ref 0.5–2)
LACTATE SERPL-SCNC: 1.6 MMOL/L (ref 0.5–2)
LACTATE SERPL-SCNC: 1.8 MMOL/L (ref 0.5–2)
LACTATE SERPL-SCNC: 3.3 MMOL/L (ref 0.5–2)
LEUKOCYTE ESTERASE UR QL STRIP.AUTO: ABNORMAL
LYMPHOCYTES # BLD AUTO: 2.66 K/UL (ref 1–4.8)
LYMPHOCYTES NFR BLD: 16.5 % (ref 22–41)
MAGNESIUM SERPL-MCNC: 1.8 MG/DL (ref 1.5–2.5)
MANUAL DIFF BLD: NORMAL
MCH RBC QN AUTO: 26.1 PG (ref 27–33)
MCHC RBC AUTO-ENTMCNC: 30.8 G/DL (ref 32.3–36.5)
MCV RBC AUTO: 84.7 FL (ref 81.4–97.8)
MICRO URNS: ABNORMAL
MONOCYTES # BLD AUTO: 1.1 K/UL (ref 0–0.85)
MONOCYTES NFR BLD AUTO: 7 % (ref 0–13.4)
MORPHOLOGY BLD-IMP: NORMAL
NEUTROPHILS # BLD AUTO: 12.32 K/UL (ref 1.82–7.42)
NEUTROPHILS NFR BLD: 76.5 % (ref 44–72)
NITRITE UR QL STRIP.AUTO: NEGATIVE
NRBC # BLD AUTO: 0 K/UL
NRBC BLD-RTO: 0 /100 WBC (ref 0–0.2)
NT-PROBNP SERPL IA-MCNC: 2301 PG/ML (ref 0–125)
PH UR STRIP.AUTO: 5.5 [PH] (ref 5–8)
PHOSPHATE SERPL-MCNC: 3.4 MG/DL (ref 2.5–4.5)
PLATELET # BLD AUTO: 433 K/UL (ref 164–446)
PLATELET BLD QL SMEAR: NORMAL
PMV BLD AUTO: 9.3 FL (ref 9–12.9)
POIKILOCYTOSIS BLD QL SMEAR: NORMAL
POTASSIUM SERPL-SCNC: 3.3 MMOL/L (ref 3.6–5.5)
PROCALCITONIN SERPL-MCNC: 0.62 NG/ML
PROT SERPL-MCNC: 7.3 G/DL (ref 6–8.2)
PROT UR QL STRIP: 300 MG/DL
RBC # BLD AUTO: 3.79 M/UL (ref 4.7–6.1)
RBC # URNS HPF: >100 /HPF (ref 0–2)
RBC BLD AUTO: PRESENT
RBC UR QL AUTO: ABNORMAL
RSV RNA SPEC QL NAA+PROBE: NEGATIVE
SARS-COV-2 RNA RESP QL NAA+PROBE: NOTDETECTED
SODIUM SERPL-SCNC: 136 MMOL/L (ref 135–145)
SP GR UR STRIP.AUTO: 1.02
SPECIMEN SOURCE: NORMAL
TROPONIN T SERPL-MCNC: 84 NG/L (ref 6–19)
TROPONIN T SERPL-MCNC: 85 NG/L (ref 6–19)
TROPONIN T SERPL-MCNC: 94 NG/L (ref 6–19)
UROBILINOGEN UR STRIP.AUTO-MCNC: 1 EU/DL
WBC # BLD AUTO: 16.1 K/UL (ref 4.8–10.8)
WBC #/AREA URNS HPF: >100 /HPF
YEAST BUDDING URNS QL: PRESENT /HPF
YEAST HYPHAE #/AREA URNS HPF: PRESENT /HPF

## 2025-06-08 PROCEDURE — 83735 ASSAY OF MAGNESIUM: CPT

## 2025-06-08 PROCEDURE — 700111 HCHG RX REV CODE 636 W/ 250 OVERRIDE (IP): Mod: JZ | Performed by: INTERNAL MEDICINE

## 2025-06-08 PROCEDURE — 85007 BL SMEAR W/DIFF WBC COUNT: CPT

## 2025-06-08 PROCEDURE — 85027 COMPLETE CBC AUTOMATED: CPT

## 2025-06-08 PROCEDURE — 84484 ASSAY OF TROPONIN QUANT: CPT

## 2025-06-08 PROCEDURE — 36415 COLL VENOUS BLD VENIPUNCTURE: CPT

## 2025-06-08 PROCEDURE — A9270 NON-COVERED ITEM OR SERVICE: HCPCS | Performed by: INTERNAL MEDICINE

## 2025-06-08 PROCEDURE — 770020 HCHG ROOM/CARE - TELE (206)

## 2025-06-08 PROCEDURE — 51702 INSERT TEMP BLADDER CATH: CPT

## 2025-06-08 PROCEDURE — 5A2204Z RESTORATION OF CARDIAC RHYTHM, SINGLE: ICD-10-PCS | Performed by: EMERGENCY MEDICINE

## 2025-06-08 PROCEDURE — 99285 EMERGENCY DEPT VISIT HI MDM: CPT

## 2025-06-08 PROCEDURE — 700102 HCHG RX REV CODE 250 W/ 637 OVERRIDE(OP): Performed by: INTERNAL MEDICINE

## 2025-06-08 PROCEDURE — 84145 PROCALCITONIN (PCT): CPT

## 2025-06-08 PROCEDURE — 303105 HCHG CATHETER EXTRA

## 2025-06-08 PROCEDURE — 700111 HCHG RX REV CODE 636 W/ 250 OVERRIDE (IP): Mod: JZ

## 2025-06-08 PROCEDURE — 99152 MOD SED SAME PHYS/QHP 5/>YRS: CPT

## 2025-06-08 PROCEDURE — 700105 HCHG RX REV CODE 258: Performed by: EMERGENCY MEDICINE

## 2025-06-08 PROCEDURE — 96376 TX/PRO/DX INJ SAME DRUG ADON: CPT

## 2025-06-08 PROCEDURE — 81001 URINALYSIS AUTO W/SCOPE: CPT

## 2025-06-08 PROCEDURE — 96365 THER/PROPH/DIAG IV INF INIT: CPT

## 2025-06-08 PROCEDURE — 93005 ELECTROCARDIOGRAM TRACING: CPT | Mod: TC | Performed by: EMERGENCY MEDICINE

## 2025-06-08 PROCEDURE — 80053 COMPREHEN METABOLIC PANEL: CPT

## 2025-06-08 PROCEDURE — 71045 X-RAY EXAM CHEST 1 VIEW: CPT

## 2025-06-08 PROCEDURE — 83880 ASSAY OF NATRIURETIC PEPTIDE: CPT

## 2025-06-08 PROCEDURE — 96375 TX/PRO/DX INJ NEW DRUG ADDON: CPT

## 2025-06-08 PROCEDURE — 82962 GLUCOSE BLOOD TEST: CPT | Mod: 91

## 2025-06-08 PROCEDURE — 93005 ELECTROCARDIOGRAM TRACING: CPT | Mod: TC

## 2025-06-08 PROCEDURE — 0241U HCHG SARS-COV-2 COVID-19 NFCT DS RESP RNA 4 TRGT MIC: CPT

## 2025-06-08 PROCEDURE — 83605 ASSAY OF LACTIC ACID: CPT | Mod: 91

## 2025-06-08 PROCEDURE — 92960 CARDIOVERSION ELECTRIC EXT: CPT

## 2025-06-08 PROCEDURE — 97602 WOUND(S) CARE NON-SELECTIVE: CPT

## 2025-06-08 PROCEDURE — 99291 CRITICAL CARE FIRST HOUR: CPT | Performed by: INTERNAL MEDICINE

## 2025-06-08 PROCEDURE — 87086 URINE CULTURE/COLONY COUNT: CPT

## 2025-06-08 PROCEDURE — 87040 BLOOD CULTURE FOR BACTERIA: CPT

## 2025-06-08 PROCEDURE — 84100 ASSAY OF PHOSPHORUS: CPT

## 2025-06-08 PROCEDURE — 700105 HCHG RX REV CODE 258: Performed by: INTERNAL MEDICINE

## 2025-06-08 PROCEDURE — 700111 HCHG RX REV CODE 636 W/ 250 OVERRIDE (IP): Mod: JZ | Performed by: EMERGENCY MEDICINE

## 2025-06-08 RX ORDER — VANCOMYCIN 1.75 G/350ML
1250 INJECTION, SOLUTION INTRAVENOUS EVERY 24 HOURS
Status: ON HOLD | COMMUNITY
Start: 2025-06-06 | End: 2025-06-10

## 2025-06-08 RX ORDER — AMOXICILLIN 250 MG
2 CAPSULE ORAL EVERY EVENING
Status: DISCONTINUED | OUTPATIENT
Start: 2025-06-08 | End: 2025-06-10

## 2025-06-08 RX ORDER — OXYCODONE HYDROCHLORIDE 5 MG/1
5 TABLET ORAL EVERY 6 HOURS PRN
Refills: 0 | Status: DISCONTINUED | OUTPATIENT
Start: 2025-06-08 | End: 2025-06-10

## 2025-06-08 RX ORDER — SODIUM BICARBONATE 650 MG/1
650 TABLET ORAL 2 TIMES DAILY WITH MEALS
Status: DISCONTINUED | OUTPATIENT
Start: 2025-06-08 | End: 2025-06-10

## 2025-06-08 RX ORDER — HYDROMORPHONE HYDROCHLORIDE 2 MG/1
2 TABLET ORAL 2 TIMES DAILY
Status: ON HOLD | COMMUNITY
End: 2025-06-11

## 2025-06-08 RX ORDER — METOPROLOL TARTRATE 25 MG/1
25 TABLET, FILM COATED ORAL 2 TIMES DAILY
Status: DISCONTINUED | OUTPATIENT
Start: 2025-06-08 | End: 2025-06-12 | Stop reason: HOSPADM

## 2025-06-08 RX ORDER — ACETAMINOPHEN 325 MG/1
650 TABLET ORAL EVERY 6 HOURS PRN
Status: DISCONTINUED | OUTPATIENT
Start: 2025-06-08 | End: 2025-06-10

## 2025-06-08 RX ORDER — SODIUM CHLORIDE 9 MG/ML
1000 INJECTION, SOLUTION INTRAVENOUS ONCE
Status: COMPLETED | OUTPATIENT
Start: 2025-06-08 | End: 2025-06-08

## 2025-06-08 RX ORDER — DEXTROSE MONOHYDRATE 25 G/50ML
25 INJECTION, SOLUTION INTRAVENOUS
Status: DISCONTINUED | OUTPATIENT
Start: 2025-06-08 | End: 2025-06-11

## 2025-06-08 RX ORDER — SODIUM CHLORIDE 9 MG/ML
INJECTION, SOLUTION INTRAVENOUS CONTINUOUS
Status: DISCONTINUED | OUTPATIENT
Start: 2025-06-09 | End: 2025-06-10

## 2025-06-08 RX ORDER — ONDANSETRON 2 MG/ML
4 INJECTION INTRAMUSCULAR; INTRAVENOUS EVERY 4 HOURS PRN
Status: DISCONTINUED | OUTPATIENT
Start: 2025-06-08 | End: 2025-06-12 | Stop reason: HOSPADM

## 2025-06-08 RX ORDER — SODIUM CHLORIDE, SODIUM LACTATE, POTASSIUM CHLORIDE, AND CALCIUM CHLORIDE .6; .31; .03; .02 G/100ML; G/100ML; G/100ML; G/100ML
500 INJECTION, SOLUTION INTRAVENOUS
Status: DISCONTINUED | OUTPATIENT
Start: 2025-06-08 | End: 2025-06-12 | Stop reason: HOSPADM

## 2025-06-08 RX ORDER — TAMSULOSIN HYDROCHLORIDE 0.4 MG/1
0.4 CAPSULE ORAL DAILY
Status: DISCONTINUED | OUTPATIENT
Start: 2025-06-09 | End: 2025-06-10

## 2025-06-08 RX ORDER — MIDAZOLAM HYDROCHLORIDE 1 MG/ML
INJECTION INTRAMUSCULAR; INTRAVENOUS
Status: COMPLETED
Start: 2025-06-08 | End: 2025-06-08

## 2025-06-08 RX ORDER — LABETALOL HYDROCHLORIDE 5 MG/ML
10 INJECTION, SOLUTION INTRAVENOUS EVERY 4 HOURS PRN
Status: DISCONTINUED | OUTPATIENT
Start: 2025-06-08 | End: 2025-06-12 | Stop reason: HOSPADM

## 2025-06-08 RX ORDER — ATORVASTATIN CALCIUM 20 MG/1
20 TABLET, FILM COATED ORAL EVERY EVENING
Status: DISCONTINUED | OUTPATIENT
Start: 2025-06-08 | End: 2025-06-12 | Stop reason: HOSPADM

## 2025-06-08 RX ORDER — MIDAZOLAM HYDROCHLORIDE 1 MG/ML
3 INJECTION INTRAMUSCULAR; INTRAVENOUS ONCE
Status: DISCONTINUED | OUTPATIENT
Start: 2025-06-08 | End: 2025-06-08

## 2025-06-08 RX ORDER — BACLOFEN 5 MG/1
2.5 TABLET ORAL EVERY 8 HOURS PRN
Status: DISCONTINUED | OUTPATIENT
Start: 2025-06-08 | End: 2025-06-10

## 2025-06-08 RX ORDER — POLYETHYLENE GLYCOL 3350 17 G/17G
1 POWDER, FOR SOLUTION ORAL
Status: DISCONTINUED | OUTPATIENT
Start: 2025-06-08 | End: 2025-06-10

## 2025-06-08 RX ORDER — PIPERACILLIN SODIUM, TAZOBACTAM SODIUM 4; .5 G/20ML; G/20ML
4.5 INJECTION, POWDER, LYOPHILIZED, FOR SOLUTION INTRAVENOUS EVERY 6 HOURS
Status: ON HOLD | COMMUNITY
Start: 2025-06-06 | End: 2025-06-10

## 2025-06-08 RX ORDER — MIDAZOLAM HYDROCHLORIDE 1 MG/ML
INJECTION INTRAMUSCULAR; INTRAVENOUS
Status: DISCONTINUED | OUTPATIENT
Start: 2025-06-08 | End: 2025-06-08

## 2025-06-08 RX ORDER — OXYCODONE HYDROCHLORIDE 5 MG/1
5 TABLET ORAL EVERY 6 HOURS PRN
Status: ON HOLD | COMMUNITY
Start: 2025-06-06 | End: 2025-06-11

## 2025-06-08 RX ORDER — OXYCODONE HYDROCHLORIDE 5 MG/1
10 TABLET ORAL
Status: ON HOLD | COMMUNITY
End: 2025-06-11

## 2025-06-08 RX ORDER — INSULIN LISPRO 100 [IU]/ML
1-6 INJECTION, SOLUTION INTRAVENOUS; SUBCUTANEOUS
Status: DISCONTINUED | OUTPATIENT
Start: 2025-06-08 | End: 2025-06-11

## 2025-06-08 RX ORDER — ONDANSETRON 4 MG/1
4 TABLET, ORALLY DISINTEGRATING ORAL EVERY 4 HOURS PRN
Status: DISCONTINUED | OUTPATIENT
Start: 2025-06-08 | End: 2025-06-12 | Stop reason: HOSPADM

## 2025-06-08 RX ADMIN — METOPROLOL TARTRATE 25 MG: 25 TABLET, FILM COATED ORAL at 18:07

## 2025-06-08 RX ADMIN — AMPICILLIN AND SULBACTAM 3 G: 1; 2 INJECTION, POWDER, FOR SOLUTION INTRAMUSCULAR; INTRAVENOUS at 18:19

## 2025-06-08 RX ADMIN — AMPICILLIN AND SULBACTAM 3 G: 1; 2 INJECTION, POWDER, FOR SOLUTION INTRAMUSCULAR; INTRAVENOUS at 23:40

## 2025-06-08 RX ADMIN — SODIUM BICARBONATE 650 MG: 650 TABLET ORAL at 18:05

## 2025-06-08 RX ADMIN — MIDAZOLAM HYDROCHLORIDE 3 MG: 1 INJECTION, SOLUTION INTRAMUSCULAR; INTRAVENOUS at 10:12

## 2025-06-08 RX ADMIN — APIXABAN 5 MG: 5 TABLET, FILM COATED ORAL at 18:07

## 2025-06-08 RX ADMIN — FENTANYL CITRATE 50 MCG: 50 INJECTION, SOLUTION INTRAMUSCULAR; INTRAVENOUS at 11:03

## 2025-06-08 RX ADMIN — DOCUSATE SODIUM 50 MG AND SENNOSIDES 8.6 MG 2 TABLET: 8.6; 5 TABLET, FILM COATED ORAL at 18:06

## 2025-06-08 RX ADMIN — PIPERACILLIN AND TAZOBACTAM 4.5 G: 4; .5 INJECTION, POWDER, FOR SOLUTION INTRAVENOUS at 13:10

## 2025-06-08 RX ADMIN — SODIUM CHLORIDE 1000 ML: 9 INJECTION, SOLUTION INTRAVENOUS at 10:30

## 2025-06-08 RX ADMIN — OXYCODONE 5 MG: 5 TABLET ORAL at 18:06

## 2025-06-08 RX ADMIN — FENTANYL CITRATE 50 MCG: 50 INJECTION, SOLUTION INTRAMUSCULAR; INTRAVENOUS at 16:02

## 2025-06-08 RX ADMIN — ATORVASTATIN CALCIUM 20 MG: 20 TABLET, FILM COATED ORAL at 18:07

## 2025-06-08 RX ADMIN — FENTANYL CITRATE 50 MCG: 50 INJECTION, SOLUTION INTRAMUSCULAR; INTRAVENOUS at 10:11

## 2025-06-08 ASSESSMENT — CHA2DS2 SCORE
PRIOR STROKE OR TIA OR THROMBOEMBOLISM: NO
DIABETES: YES
HYPERTENSION: NO
AGE 75 OR GREATER: NO
AGE 65 TO 74: YES
CHF OR LEFT VENTRICULAR DYSFUNCTION: NO
CHA2DS2 VASC SCORE: 2
VASCULAR DISEASE: NO
SEX: MALE

## 2025-06-08 ASSESSMENT — PAIN DESCRIPTION - PAIN TYPE
TYPE: CHRONIC PAIN
TYPE: ACUTE PAIN
TYPE: CHRONIC PAIN
TYPE: ACUTE PAIN

## 2025-06-08 ASSESSMENT — PATIENT HEALTH QUESTIONNAIRE - PHQ9
SUM OF ALL RESPONSES TO PHQ9 QUESTIONS 1 AND 2: 2
2. FEELING DOWN, DEPRESSED, IRRITABLE, OR HOPELESS: SEVERAL DAYS
1. LITTLE INTEREST OR PLEASURE IN DOING THINGS: SEVERAL DAYS

## 2025-06-08 ASSESSMENT — ENCOUNTER SYMPTOMS: MYALGIAS: 1

## 2025-06-08 ASSESSMENT — FIBROSIS 4 INDEX: FIB4 SCORE: 1.59

## 2025-06-08 NOTE — ED NOTES
Med Rec complete per MAR from [facility] (returned to chart)  Allergies reviewed  Antibiotics in the past 30 days:yes  Anticoagulant in past 14 days:yes  Anticoagulant:Eliquis 5 mg Last dose:06/08/25  Pharmacy patient utilizes:[facility]

## 2025-06-08 NOTE — ED NOTES
Pt having severe pain with movement.  Order for one time dose of fentanyl for pain given by hospitalist.  Pt will be medicated before transport to Tele 7.      Plan for pt to transfer to Tele 7 in next 20 minutes. Telemetry nurse aware that pt requires advanced transport.

## 2025-06-08 NOTE — WOUND TEAM
"[facility]  Inpatient Services  Initial Wound and Skin Care Evaluation    Admission Date: 6/8/2025     Last order of IP CONSULT TO WOUND CARE was found on 6/8/2025 from Hospital Encounter on 6/8/2025     HPI, PMH, SH: Reviewed    Past Surgical History[1]  Social History     Tobacco Use    Smoking status: Former     Types: Cigarettes    Smokeless tobacco: Never    Tobacco comments:     \"Rare use 50 years ago, total 3 weeks\"   Substance Use Topics    Alcohol use: No     Chief Complaint   Patient presents with    Tachycardia     PT arrives from [facility] with a HR =180.  Appears irregular in rhythm.    Pain     Pt reports generalized pain since yesterday.     Diagnosis: Lactic acidosis [E87.20]    Unit where seen by Wound Team: [facility] 13/13 [facility]     WOUND CONSULT RELATED TO:  bilateral LE, bilateral heels, and sacrum    WOUND TEAM PLAN OF CARE - Frequency of Follow-up:   Nursing to follow dressing orders written for wound care. Contact wound team if area fails to progress, deteriorates or with any questions/concerns if something comes up before next scheduled follow up (See below as to whether wound is following and frequency of wound follow up)   Weekly - bilateral LEs  Not following, consult as needed  - sacrum and bilateral heels    WOUND HISTORY:   Venous statis ulceration to bilateral LE, worsen since last wound team assessment       WOUND ASSESSMENT/LDA  Wound 02/22/25 Vascular Ulcer Mixed Leg Circumferential Bilateral (Active)   Date First Assessed/Time First Assessed: 02/22/25 2030   Present on Original Admission: Yes  Hand Hygiene Completed: Yes  Primary Wound Type: Vascular Ulcer  Secondary Wound Type - Vascular Ulcer: Mixed  Location: Leg  Wound Orientation: Circumferenti...      Assessments 6/8/2025  3:00 PM   Wound Image      Site Assessment Red   Periwound Assessment Clean;Dry;Intact   Margins Defined edges;Attached edges   Closure Secondary intention   Drainage Amount Moderate   Drainage " Description Serosanguineous   Treatments Cleansed;Irrigation;Nonselective debridement;Site care   Wound Cleansing Normal Saline Irrigation   Periwound Protectant Not Applicable   Moisture Containment Device None   Dressing Status Clean;Dry;Intact   Dressing Changed New   Dressing Cleansing/Solutions Not Applicable   Dressing Options Viscopaste;Absorbent Abdominal Pad;Dry Roll Gauze   Dressing Change/Treatment Frequency Daily, and As Needed   NEXT Dressing Change/Treatment Date 06/09/25   NEXT Weekly Photo (Inpatient Only) 06/15/25   Wound Team Following Weekly   Non-staged Wound Description Full thickness   Exposed Structures DIANNA   WOUND NURSE ONLY - Time Spent with Patient (mins) 30       Wound 02/22/25 Pressure Injury Sacrum;Coccyx sDTI POA (Active)   Date First Assessed/Time First Assessed: 02/22/25 2030   Present on Original Admission: Yes  Primary Wound Type: Pressure Injury  Location: Sacrum;Coccyx  Wound Description (Comments): sDTI POA      Assessments 6/8/2025  3:00 PM   Wound Image     Site Assessment Red;Purple;Bleeding   Periwound Assessment Denuded   Margins Undefined edges   Closure Adhesive bandage   Drainage Amount Scant   Drainage Description Serosanguineous   Treatments Cleansed;Irrigation;Nonselective debridement;Site care;Offloading   Offloading/DME Sacral offloading dressing   Wound Cleansing Soap and Water   Periwound Protectant Not Applicable   Moisture Containment Device Indwelling Catheter   Dressing Status Clean;Dry;Intact   Dressing Changed Reapplied   Dressing Cleansing/Solutions Not Applicable   Dressing Options Sacral Dressing - Offloading   Dressing Change/Treatment Frequency Every 72 hrs, and As Needed   NEXT Dressing Change/Treatment Date 06/11/25   Wound Team Following Not following   Pressure Injury Stage Deep Tissue   Exposed Structures DIANNA   WOUND NURSE ONLY - Time Spent with Patient (mins) 15       Wound 02/22/25 Pressure Injury Heel Left sDTI POA (Active)   Date First Assessed:  02/22/25   Present on Original Admission: Yes  Hand Hygiene Completed: Yes  Primary Wound Type: Pressure Injury  Location: Heel  Laterality: Left  Wound Description (Comments): sDTI POA      Assessments 6/8/2025  3:00 PM   Wound Image     Site Assessment Red;Purple   Periwound Assessment Clean;Dry;Intact   Margins Defined edges   Closure Adhesive bandage   Drainage Amount None   Treatments Cleansed;Irrigation;Nonselective debridement;Site care;Offloading   Offloading/DME Heel offloading dressing   Wound Cleansing Soap and Water   Periwound Protectant Not Applicable   Dressing Status Clean;Dry;Intact   Dressing Changed Reapplied   Dressing Cleansing/Solutions Not Applicable   Dressing Options Heel Dressing - Offloading   Dressing Change/Treatment Frequency Every 72 hrs, and As Needed   NEXT Dressing Change/Treatment Date 06/11/25   NEXT Weekly Photo (Inpatient Only) 06/18/25   Wound Team Following Not following   Pressure Injury Stage Deep Tissue   Shape irregular   Wound Odor None   WOUND NURSE ONLY - Time Spent with Patient (mins) 15        Vascular:    TERRIE:   No results found.    Lab Values:    Lab Results   Component Value Date/Time    WBC 16.1 (H) 06/08/2025 10:00 AM    RBC 3.79 (L) 06/08/2025 10:00 AM    HEMOGLOBIN 9.9 (L) 06/08/2025 10:00 AM    HEMATOCRIT 32.1 (L) 06/08/2025 10:00 AM    CREACTPROT 20.60 (H) 02/22/2025 07:07 PM    SEDRATEWES 65 (H) 02/22/2025 05:26 PM    HBA1C 9.0 (H) 02/25/2025 04:26 AM    PLATELETCT 433 06/08/2025 10:00 AM         Culture Results show:  No results found for this or any previous visit (from the past 720 hours).    Pain Level/Medicated:  patient declined pain meds       INTERVENTIONS BY WOUND TEAM:  Chart and images reviewed. Discussed with bedside RN. All areas of concern (based on picture review, LDA review and discussion with bedside RN) have been thoroughly assessed. Documentation of areas based on significant findings. This RN in to assess patient. Performed standard wound  care which includes appropriate positioning, dressing removal and non-selective debridement. Pictures and measurements obtained weekly if/when required.    Wound:  SACRUM  Preparation for Dressing removal: Removed without difficulty  Cleansed/Non-selectively Debrided with:  Moist warm washcloth  Deana wound: Cleansed with Moist warm washcloth, Prepped with N/A  Primary Dressing:  sacral offloading foam  Secondary (Outer) Dressing: offloading efforts, wedges     Wound:  BILATERAL HEELS  Preparation for Dressing removal: Removed without difficulty  Cleansed/Non-selectively Debrided with:  Moist warm washcloth  Deana wound: Cleansed with Moist warm washcloth, Prepped with N/A  Primary Dressing:  heel offloading foams  Secondary (Outer) Dressing: offloading with pillow floating heels     Wound:  BILATERAL LEGS  Preparation for Dressing removal: Removed without difficulty  Cleansed/Non-selectively Debrided with:  Normal Saline, Gauze, and Moist warm washcloth  Deana wound: Cleansed with Normal Saline, Gauze, and Moist warm washcloth, Prepped with N/A  Primary Dressing:  viscopaste patch  Secondary (Outer) Dressing: ABD, rolled gauze, tubigrib left at bedside as patient refused at this time.     Advanced Wound Care Discharge Planning  Number of Clinicians necessary to complete wound care: 2  Is patient requiring IV pain medications for dressing changes:  No   Length of time for dressing [id number] min. (This does not include chart review, pre-medication time, set up, clean up or time spent charting.)    Interdisciplinary consultation: Patient, Bedside RN (Kendal), Ivonne WILL (Wound RN).  Pressure injury and staging reviewed with Ivonne WILL (Wound RN).    EVALUATION / RATIONALE FOR TREATMENT:     Date:  06/08/25  Wound Status:  Initial evaluation    Sacrum and left heel with known deep tissue injury with non-blanching purple discoloration.  Nursing to continue with offloading with foams and wedges.  Bilateral LEs have worsen  since last assessment from wound team on 3/22.  Viscopaste patch with modified wraps were applied at this intervention to help aleviate some of the partial thickness wounds and provide a moist environment for wound healing.  Viscopatch zinc impregnated gauze to encourage re-epithelialization of superficial 100% viable wound bed, and to provide a non-stick wound contact layer.  ABD and rolled gauze applied for absorption.  Tubigrib size E was brought to bedside, but patient refused at this time.  Placed in wound care bag and will transfer with patient up to the floor.          Goals: Steady decrease in wound area and depth weekly.    NURSING PLAN OF CARE ORDERS:  Dressing changes: See Dressing Care orders  Skin care: See Skin Care orders  RN Prevention Protocol    NUTRITION RECOMMENDATIONS   Wound Team Recommendations:  Protein supplements    DIET ORDERS (From admission to next 24h)       Start     Ordered    06/08/25 1236  Diet Order Diet: Cardiac  ALL MEALS        Question:  Diet:  Answer:  Cardiac    06/08/25 1236                    PREVENTATIVE INTERVENTIONS:    Q shift Tyrel - performed per nursing policy  Q shift pressure point assessments - performed per nursing policy    Surface/Positioning  gurney - Currently in Place    Offloading/Redistribution  Sacral offloading dressing (Silicone dressing) - Currently in Place  Heel offloading dressing (Silicone dressing) - Currently in Place  Float Heels off Bed with Pillows - Currently in Place           Respiratory  N/A    Containment/Moisture Prevention    Dri-enmanuel pad - Currently in Place  Schulte Catheter - Currently in Place    Mobilization      Unable to assess     Anticipated discharge plans:  TBD        Vac Discharge Needs:  Vac Discharge plan is purely a recommendation from wound team and not a requirement for discharge unless otherwise stated by physician.  Not Applicable Pt not on a wound vac        [1]   Past Surgical History:  Procedure Laterality Date     CATARACT EXTRACTION WITH IOL Bilateral 2020    COLONOSCOPY  01/22/2018    biopsy colonic mucosa anastamosis @ 10cm  positive for moderately diff adenocarcinoma    LOW ANTERIOR RESECTION LAPAROSCOPIC  03/27/2015    (followed by wound infection 4/14/15)    COLONOSCOPY      Aug 2017    OTHER      oral surgery as a child    OTHER ABDOMINAL SURGERY      appendectomy    TOE AMPUTATION Bilateral     R 2nd digit, L 2nd digit

## 2025-06-08 NOTE — ED TRIAGE NOTES
Per EMS report, pt is being treated for B LE cellulitis at [facility] as well as hip pain.  Pt reports no recent surgery.      Pt receiving Vancomycin and Piperacillin/Tazobactam for cellulitis per PICC line daily.  PIC line accidentally removed by pt yesterday.,    H/O atrial fibrillation and take eliquis daily.

## 2025-06-08 NOTE — ED NOTES
Emergent synchronized cardioversion at 200 joules successfully converted HR to 101 SR.  PT tolerated procedure well.  See MAR for medication administration.

## 2025-06-08 NOTE — ED NOTES
Attempted to reposition pt, but pt refusing to turn on his L side.  Cushions rmoved from R side and pt repositioned without turning.  Risk for skinh breakdown explained to pt, but he continues to refuse repositioning.

## 2025-06-08 NOTE — ED TRIAGE NOTES
Chief Complaint   Patient presents with    Tachycardia     PT arrives from [facility] with a HR =180.  Appears irregular in rhythm.    Pain     Pt reports generalized pain since yesterday.     Pt's BP =84/60 on arrival.  PICC line removed by pt yesterday.

## 2025-06-08 NOTE — ED NOTES
Pt remains on R side, refusing repositioning at this time.  Remains oriented to situation and place.

## 2025-06-08 NOTE — ASSESSMENT & PLAN NOTE
Patient found to have lactic acidosis most likely secondary infection.  I ordered sepsis order set up  Trend lactic acid level.

## 2025-06-08 NOTE — ED PROVIDER NOTES
ED Provider Note    CHIEF COMPLAINT  Chief Complaint   Patient presents with    Tachycardia     PT arrives from [facility] with a HR =180.  Appears irregular in rhythm.    Pain     Pt reports generalized pain since yesterday.       EXTERNAL RECORDS REVIEWED  Inpatient Notes discharge summary 3/24/2025 after admission for altered level of consciousness per [facility] staff.  History of cystitis and cellulitis to right lower extremity.  History of type 2 diabetes complicated by neuropathy, CKD, A-fib on apixaban and recent E. coli bacteremia from urinary source status post Schulte here with metabolic encephalopathy and left lower extremity cellulitis.  UA was positive for granular casts indicative of ATN from infection.  Wound culture positive for ceftriaxone intermediate E. coli, ceftriaxone susceptible Klebsiella and MRSA.  Blood cultures no growth to date.  Creatinine improved and stabilized at 1.6.  Initiated on sodium bicarbonate for metabolic acidosis.  Discharged to complete 5-day course antibiotics for cellulitis 3/24/2025.  Ongoing wound care.      HPI/ROS  LIMITATION TO HISTORY   Select: Altered mental status / Confusion  OUTSIDE HISTORIAN(S):  Limited from EMS and transferring staff    Sebastian Sinha is a 74 y.o. male who presents to the emergency department by ambulance from [facility] with heart rate 180.  Patient presented by EMS from [facility] with pain.  Patient had generalized pain since yesterday, ill feeling since that time as well.  Noted on arrival to this facility and with EMS to have heart rate 180s-190s.  Patient denies chest pain but has shortness of breath.  Denies syncope.  Denies fever.  Patient admits to feeling confused and weak.    PAST MEDICAL HISTORY   has a past medical history of A-fib (HCC), Arthritis, BPH (benign prostatic hyperplasia), Cellulitis, Diabetes (HCC), Kidney disease, Rectal cancer (HCC), and Sleep apnea.    SURGICAL HISTORY   has a past surgical history that  "includes other abdominal surgery; other; low anterior resection laparoscopic (03/27/2015); colonoscopy; colonoscopy (01/22/2018); cataract extraction with iol (Bilateral, 2020); and toe amputation (Bilateral).    FAMILY HISTORY  Family History   Problem Relation Age of Onset    Cancer Mother         colon cancer    COPD Father        SOCIAL HISTORY  Social History     Tobacco Use    Smoking status: Former     Types: Cigarettes    Smokeless tobacco: Never    Tobacco comments:     \"Rare use 50 years ago, total 3 weeks\"   Vaping Use    Vaping status: Never Used   Substance and Sexual Activity    Alcohol use: No    Drug use: No    Sexual activity: Not on file       CURRENT MEDICATIONS  Home Medications    **Home medications have not yet been reviewed for this encounter**       Audit from Redirected Encounters    **Home medications have not yet been reviewed for this encounter**         ALLERGIES  Allergies[1]    PHYSICAL EXAM  VITAL SIGNS: BP (!) 84/60   Pulse (!) 192   Temp 36.8 °C (98.3 °F) (Temporal)   Resp (!) 21   Wt 90.7 kg (200 lb)   SpO2 99%   BMI 28.70 kg/m²    Pulse ox interpretation: I interpret this pulse ox as normal.  Constitutional: Alert in no apparent distress.  Disheveled.  HENT: Normocephalic, atraumatic. Bilateral external ears normal, Nose normal.  Dry mucous membranes.    Eyes: Pupils are equal and reactive, Conjunctiva normal.   Neck: Normal range of motion, Supple  Lymphatic: No lymphadenopathy noted.   Cardiovascular: Regular rate and rhythm, no murmurs. Distal pulses intact.  peripheral edema.  Thorax & Lungs: Normal breath sounds.  No wheezing/rales/ronchi. No increased work of breathing, clipped speech or retractions.   Abdomen: Soft, non-distended, non-tender to palpation. No palpable or pulsatile masses. No peritoneal signs.  Colostomy left lower quadrant, soft brown stool.  Skin: Warm, Dry  Musculoskeletal: No deformities noted.  Chronic skin changes bilateral lower extremities with " open wounds.  Left lateral lower leg with wound and visualized muscle.  No extending erythema, crepitus or fluctuance.  Feet are edematous bilaterally.  Sacrum with erythema and weeping.  Neurologic: Alert x 3.  Psychiatric: Flat affect.  Cooperative.      EKG/LABS  Results for orders placed or performed during the hospital encounter of 06/08/25   Lactic Acid    Collection Time: 06/08/25 10:00 AM   Result Value Ref Range    Lactic Acid 3.3 (H) 0.5 - 2.0 mmol/L   CBC with Differential    Collection Time: 06/08/25 10:00 AM   Result Value Ref Range    WBC 16.1 (H) 4.8 - 10.8 K/uL    RBC 3.79 (L) 4.70 - 6.10 M/uL    Hemoglobin 9.9 (L) 14.0 - 18.0 g/dL    Hematocrit 32.1 (L) 42.0 - 52.0 %    MCV 84.7 81.4 - 97.8 fL    MCH 26.1 (L) 27.0 - 33.0 pg    MCHC 30.8 (L) 32.3 - 36.5 g/dL    RDW 52.2 (H) 35.9 - 50.0 fL    Platelet Count 433 164 - 446 K/uL    MPV 9.3 9.0 - 12.9 fL    Neutrophils-Polys 76.50 (H) 44.00 - 72.00 %    Lymphocytes 16.50 (L) 22.00 - 41.00 %    Monocytes 7.00 0.00 - 13.40 %    Eosinophils 0.00 0.00 - 6.90 %    Basophils 0.00 0.00 - 1.80 %    Nucleated RBC 0.00 0.00 - 0.20 /100 WBC    Neutrophils (Absolute) 12.32 (H) 1.82 - 7.42 K/uL    Lymphs (Absolute) 2.66 1.00 - 4.80 K/uL    Monos (Absolute) 1.10 (H) 0.00 - 0.85 K/uL    Eos (Absolute) 0.00 0.00 - 0.51 K/uL    Baso (Absolute) 0.00 0.00 - 0.12 K/uL    NRBC (Absolute) 0.00 K/uL   Complete Metabolic Panel    Collection Time: 06/08/25 10:00 AM   Result Value Ref Range    Sodium 136 135 - 145 mmol/L    Potassium 3.3 (L) 3.6 - 5.5 mmol/L    Chloride 100 96 - 112 mmol/L    Co2 18 (L) 20 - 33 mmol/L    Anion Gap 18.0 (H) 7.0 - 16.0    Glucose 173 (H) 65 - 99 mg/dL    Bun 22 8 - 22 mg/dL    Creatinine 1.40 0.50 - 1.40 mg/dL    Calcium 8.8 8.5 - 10.5 mg/dL    Correct Calcium 9.9 8.5 - 10.5 mg/dL    AST(SGOT) 49 (H) 12 - 45 U/L    ALT(SGPT) 39 2 - 50 U/L    Alkaline Phosphatase 142 (H) 30 - 99 U/L    Total Bilirubin 0.6 0.1 - 1.5 mg/dL    Albumin 2.6 (L) 3.2 - 4.9  g/dL    Total Protein 7.3 6.0 - 8.2 g/dL    Globulin 4.7 (H) 1.9 - 3.5 g/dL    A-G Ratio 0.6 g/dL   MAGNESIUM    Collection Time: 25 10:00 AM   Result Value Ref Range    Magnesium 1.8 1.5 - 2.5 mg/dL   PHOSPHORUS    Collection Time: 25 10:00 AM   Result Value Ref Range    Phosphorus 3.4 2.5 - 4.5 mg/dL   proBrain Natriuretic Peptide, NT    Collection Time: 25 10:00 AM   Result Value Ref Range    NT-proBNP 2301 (H) 0 - 125 pg/mL   PROCALCITONIN    Collection Time: 25 10:00 AM   Result Value Ref Range    Procalcitonin 0.62 (H) <0.25 ng/mL   TROPONIN    Collection Time: 25 10:00 AM   Result Value Ref Range    Troponin T 85 (H) 6 - 19 ng/L   ESTIMATED GFR    Collection Time: 25 10:00 AM   Result Value Ref Range    GFR (CKD-EPI) 53 (A) >60 mL/min/1.73 m 2   DIFFERENTIAL MANUAL    Collection Time: 25 10:00 AM   Result Value Ref Range    Manual Diff Status PERFORMED     Comment See Comment    PERIPHERAL SMEAR REVIEW    Collection Time: 25 10:00 AM   Result Value Ref Range    Peripheral Smear Review see below    PLATELET ESTIMATE    Collection Time: 25 10:00 AM   Result Value Ref Range    Plt Estimation Normal    MORPHOLOGY    Collection Time: 25 10:00 AM   Result Value Ref Range    RBC Morphology Present     Poikilocytosis 2+     Echinocytes 1+    EKG (NOW)    Collection Time: 25 10:07 AM   Result Value Ref Range    Report       [facility] Emergency Dept.    Test Date:  2025  Pt Name:    CYRUS SAAB                 Department: ER  MRN:        [id number]                      Room:        13  Gender:     Male                         Technician: [id number]  :        1950                   Requested By:ER TRIAGE PROTOCOL  Order #:    [id number]                    Laura MD: JIAN GODINEZ, DO    Measurements  Intervals                                Axis  Rate:       175                          P:          222  NH:         112                           QRS:        -46  QRSD:       102                          T:          114  QT:         259  QTc:        442    Interpretive Statements  Supraventricular tachycardia  Left anterior fascicular block  Abnormal R-wave progression, late transition  Repolarization abnormality, prob rate related  Compared to ECG 2025 08:03:37  Left anterior fascicular block now present  Early repolarization now present  Sinus bradycardia no longer pres ent  Atrial premature complex(es) no longer present  Intraventricular conduction delay no longer present  Electronically Signed On 2025 10:07:10 PDT by JIAN GODINEZ DO     EKG    Collection Time: 25 11:31 AM   Result Value Ref Range    Report       [facility] Emergency Dept.    Test Date:  2025  Pt Name:    CYRUS SAAB                 Department: ER  MRN:        [id number]                      Room:        13  Gender:     Male                         Technician: [id number]  :        1950                   Requested By:JIAN GODINEZ  Order #:    [id number]                    Reading MD: JIAN GODINEZ DO    Measurements  Intervals                                Axis  Rate:       113                          P:          54  CA:         138                          QRS:        -19  QRSD:       110                          T:          108  QT:         336  QTc:        461    Interpretive Statements  atrial fibrillation  Borderline left axis deviation  Abnormal R-wave progression, early transition  Abnormal T, consider ischemia, lateral leads  Baseline wander in lead(s) V2,V4  Compared to ECG 2025 09:44:30  T-wave abnormality now present  Possible ischemia now present  Supraventricular tachycar palomo no longer present  Left anterior fascicular block no longer present  Early repolarization no longer present  Electronically Signed On 2025 11:31:21 PDT by JIAN GODINEZ DO         I have independently  interpreted this EKG    RADIOLOGY/PROCEDURES   I have independently interpreted the diagnostic imaging associated with this visit and am waiting the final reading from the radiologist.   My preliminary interpretation is as follows:   Chest x-ray: Normal lung feels, no consolidation or effusion    Radiologist interpretation:  DX-CHEST-PORTABLE (1 VIEW)   Final Result         1. No acute abnormalities evident.      2. Very severe left shoulder arthritis unchanged.                       PROCEDURE  CONSCIOUS SEDATION PROCEDURE NOTE:  Patient identification was confirmed and patient consent was obtain verbally and implied given urgency  The procedure was conducted by Dr. Lee  Anesthetic used: Fentanyl 50 mcg, Versed 3 mg  Length of Time: See nurses notes  Other medications administered: \Fentanyl, Versed  Pre-procedure neuro examination revealed no deficits. Patient's airway remained patent, O2SAT adequate through procedure. Vitals remained stable. Patient tolerated procedure well without complications. Post-procedure exam showed patient w/o cranial deficits. Sensory and motor intact. Patient returned to baseline prior to disposition.  Instructions for care discussed verbally and pt provided with additional written instructions for homecare and f/u.    ELECTRICAL CARDIOVERSION  Indication: Atrial fibrillation with rapid ventricular rate, confusion, hypotension  Synchronized cardioversion at 200 J after sedation above with 1 shock.  Patient remains atrial fibrillation with rapid ventricular rate, however rate has improved 110-130s.  Patient tolerated procedure well.    COURSE & MEDICAL DECISION MAKING    ASSESSMENT, COURSE AND PLAN  Care Narrative:   Seen evaluated at bedside.  Patient is generally weak but answers most questions appropriately.  Tachycardic 180s-190s, appears atrial fibrillation.  Hypotensive systolic in 90s.  Generally ill-appearing, multiple comorbidities.  History UTI (indwelling Schulte catheter) and  lower extremity cellulitis (ongoing lower extremity wounds, weeping).  Procedural sedation and electrocardioversion as described above for unstable tachycardia.  Patient is anticoagulated on Eliquis and  compliance was confirmed via records from [facility].  There is no rate control medication on his list.  No prior echocardiogram available, IV fluid bolus initiated although given initial unstable presentation, we will proceed with electrical cardioversion.    Electrical cardioversion without difficulty.  Atrial fibrillation with much improved rate 120s.  Blood pressure trending.  Patient tolerated the sedation and procedure without difficulties.  Labs pending for sepsis.  Cultures complete.  Will exchange Schulte and send new UA.  Lower extremity wounds are chronic, left lateral lower leg appears to be deep to muscle, but no gross purulence only weeping.  No extended erythema, crepitus or fluctuance.  Sacral wound is superficial.     nonspecific leukocytosis, WBC 16.1, leftward shift without bandemia.  Lactate 3.3.  Procalcitonin is 0.62.  Patient is on vancomycin and Zosyn via PICC (which he pulled out yesterday) doses were given this morning, pharmacy to resume as indicated.  Awaiting urinalysis and cultures.  Normocytic anemia with hemoglobin 9.9 (previously 10.1 3/24/2025).  Mild hypokalemia without other electrolyte derangement.  Renal function preserved.  Troponin is 85 (previously 29), BNP is 2/3/2001, no prior for comparison.  Suspect this may be strain and rate dependent from prolonged rapid A-fib.  He will be hospitalized for further evaluation, treatment and ongoing wound care.    ADDITIONAL PROBLEMS MANAGED      DISPOSITION AND DISCUSSIONS  I have discussed management of the patient with the following physicians and JONATHAN's:    11:45 AM Dr. Greene is aware of the patient and agreeable to consultation.    CRITICAL CARE  The very real possibilty of a deterioration of this patient's condition required the  "highest level of my preparedness for sudden, emergent intervention.  I provided critical care services, which included medication orders, frequent reevaluations of the patient's condition and response to treatment, ordering and reviewing test results, and discussing the case with various consultants.  The critical care time associated with the care of the patient was 35 minutes. Review chart for interventions. This time is exclusive of any other billable procedures.       FINAL DIAGNOSIS  1. Atrial fibrillation with rapid ventricular response (HCC)    2. Confusion    3. H/O open leg wound         Electronically signed by: Ju Lee D.O., 6/8/2025 11:25 AM           [1]   Allergies  Allergen Reactions    Gabapentin Unspecified     \"Dizziness\"  On MAR from pharmacy      "

## 2025-06-08 NOTE — ED NOTES
PT on R side with boosters and position change completed.  Covid swab sent to lab, as well clean catch from newly placed aguiar catheter.

## 2025-06-08 NOTE — ASSESSMENT & PLAN NOTE
Patient found to have chronic wounds on bilateral lower extremities.  Ordered wound culture and Gram stain.  Requested wound care consultation.  Based on previous Cx, abx changed to zosyn and linezolid.   I have consulted and discussed case with ID appreciate further recommendations  Order ultrasound unremarkable    Patient need aggressive wound care

## 2025-06-08 NOTE — H&P
Hospital Medicine History & Physical Note    Date of Service  6/8/2025    Primary Care Physician  Pcp Not In Computer    Consultants  None    Specialist Names: N/A    Code Status  Full Code    Chief Complaint  Chief Complaint   Patient presents with    Tachycardia     PT arrives from [facility] with a HR =180.  Appears irregular in rhythm.    Pain     Pt reports generalized pain since yesterday.       History of Presenting Illness    Sebastian Sinha is a 74 y.o. male with past medical history of type 2 diabetes mellitus, chronic kidney disease atrial fibrillation on apixaban who presented to the hospital on 6/8/2025 from [facility] after he found to have heart rate in 180s.  As per report patient was started on antibiotic vancomycin and Zosyn for his lower extremities cellulitis and he received PICC line at the skilled nursing facility.  On my evaluation patient was unable to provide medical information.  I asking why he is here in the hospital and he told me that he comes here so frequently that he forgot why he came this time.  He reported pain in his bilateral lower extremities.    HPI is very limited as patient is a poor historian    ER course: Patient found to have atrial fibrillation with RVR and he underwent synchronized cardioversion 200 J after sedation.  Patient remains in A-fib post cardioversion but rate improved.  He received a dose of vancomycin in the ER and I checked with pharmacist patient was started on antibiotic most likely last night for his wound on his lower extremities.    I discussed about this admission with ER physician Dr. Lee.    I discussed the plan of care with patient and bedside RN.    Review of Systems  Review of Systems   Constitutional:  Positive for malaise/fatigue.   Musculoskeletal:  Positive for myalgias.       Past Medical History   has a past medical history of A-fib ([facility]), Arthritis, BPH (benign prostatic hyperplasia), Cellulitis, Diabetes (HCC), Kidney disease, Rectal  cancer (HCC), and Sleep apnea.    Surgical History   has a past surgical history that includes other abdominal surgery; other; low anterior resection laparoscopic (03/27/2015); colonoscopy; colonoscopy (01/22/2018); cataract extraction with iol (Bilateral, 2020); and toe amputation (Bilateral).     Family History  family history includes COPD in his father; Cancer in his mother.   Family history reviewed with patient. There is no family history that is pertinent to the chief complaint.     Social History   reports that he has quit smoking. His smoking use included cigarettes. He has never used smokeless tobacco. He reports that he does not drink alcohol and does not use drugs.    Allergies  Allergies[1]    Medications  Prior to Admission Medications   Prescriptions Last Dose Informant Patient Reported? Taking?   HYDROmorphone (DILAUDID) 2 MG Tab 6/8/2025 at  7:00 AM MAR from Other Facility Yes Yes   Sig: Take 2 mg by mouth 2 times a day. Hold for sedation   Pollen Extracts (PROSTAT PO) 6/8/2025 at  8:00 AM MAR from Other Facility Yes Yes   Sig: Take 1 Each by mouth 2 times a day.   SITagliptin (ZITUVIO) 100 MG Tab 6/8/2025 at  8:00 AM MAR from Other Facility Yes Yes   Sig: Take 100 mg by mouth every day.   Vancomycin HCl 1250 MG/250ML Solution 6/7/2025 at  9:30 AM MAR from Other Facility Yes Yes   Sig: Infuse 1,250 mg into a venous catheter every 24 hours.   acetaminophen (TYLENOL) 500 MG Tab Unknown MAR from Other Facility Yes No   Sig: Take 1,000 mg by mouth every 8 hours as needed for Mild Pain. 2 tablets = 1,000 mg   apixaban (ELIQUIS) 5mg Tab 6/8/2025 at  7:00 AM MAR from Other Facility Yes Yes   Sig: Take 5 mg by mouth 2 times a day.   atorvastatin (LIPITOR) 20 MG Tab 6/7/2025 at  8:00 PM MAR from Other Facility Yes Yes   Sig: Take 20 mg by mouth every evening.   baclofen (LIORESAL) 5 MG Tab 6/3/2025 MAR from Other Facility Yes No   Sig: Take 2.5 mg by mouth every 8 hours as needed (Muscle Spasms). 1/2 tablet  = 2.5 mg.   cyanocobalamin (VITAMIN B-12) 500 MCG Tab 6/8/2025 at  8:00 AM MAR from Other Facility Yes Yes   Sig: Take 500 mcg by mouth every day.   metformin (GLUCOPHAGE) 1000 MG tablet 6/8/2025 at  9:00 AM MAR from Other Facility Yes Yes   Sig: Take 1,000 mg by mouth 2 times a day with meals.   multivitamin Tab 6/8/2025 at  8:00 AM MAR from Other Facility No Yes   Sig: Take 1 Tablet by mouth every day.   oxyCODONE immediate-release (ROXICODONE) 5 MG Tab 6/5/2025 MAR from Other Facility Yes Yes   Sig: Take 10 mg by mouth one time as needed for Severe Pain. 10 mg = 2 tabs  DISCONTINUED   oxyCODONE immediate-release (ROXICODONE) 5 MG Tab New Rx MAR from Other Facility Yes Yes   Sig: Take 5 mg by mouth every 6 hours as needed for Severe Pain. Hold for sedation   piperacillin-tazobactam (ZOSYN) 4.5 (4-0.5) g Recon Soln 6/8/2025 at  6:00 AM MAR from Other Facility Yes Yes   Sig: Infuse 4.5 g into a venous catheter every 6 hours. X 14 days   sodium bicarbonate (SODIUM BICARBONATE) 650 MG Tab 6/8/2025 at  7:00 AM MAR from Other Facility No Yes   Sig: Take 1 Tablet by mouth 2 times a day with meals.   tamsulosin (FLOMAX) 0.4 MG capsule 6/8/2025 at  8:00 AM MAR from Other Facility Yes Yes   Sig: Take 0.4 mg by mouth every day. Take 30 minutes after the same meal each day.   thiamine (THIAMINE) 100 MG tablet 6/8/2025 at  8:00 AM MAR from Other Facility No Yes   Sig: Take 1 Tablet by mouth every day.      Facility-Administered Medications: None       Physical Exam  Temp:  [36.8 °C (98.3 °F)] 36.8 °C (98.3 °F)  Pulse:  [107-192] 108  Resp:  [7-74] 24  BP: ()/(56-97) 131/76  SpO2:  [93 %-100 %] 98 %  Blood Pressure : 131/76   Temperature: 36.8 °C (98.3 °F)   Pulse: (!) 108   Respiration: (!) 24   Pulse Oximetry: 98 %       Physical Exam  Vitals reviewed.   Constitutional:       General: He is not in acute distress.     Appearance: He is ill-appearing.      Comments: Somnolent   HENT:      Head: Normocephalic and  atraumatic. No contusion.      Right Ear: External ear normal.      Left Ear: External ear normal.      Nose: Nose normal.      Mouth/Throat:      Pharynx: No oropharyngeal exudate.   Eyes:      General:         Right eye: No discharge.         Left eye: No discharge.      Pupils: Pupils are equal, round, and reactive to light.   Cardiovascular:      Rate and Rhythm: Normal rate and regular rhythm.      Heart sounds: No murmur heard.     No friction rub. No gallop.   Pulmonary:      Breath sounds: No wheezing or rhonchi.   Abdominal:      General: Bowel sounds are normal. There is no distension.      Palpations: Abdomen is soft.      Tenderness: There is no abdominal tenderness. There is no rebound.   Musculoskeletal:         General: No swelling or tenderness.      Cervical back: No rigidity. No muscular tenderness.      Comments: Dressing on lower extremities   Skin:     General: Skin is warm and dry.      Coloration: Skin is not jaundiced.      Findings: Erythema and lesion present.   Neurological:      General: No focal deficit present.      Cranial Nerves: No cranial nerve deficit.      Sensory: No sensory deficit.      Comments: Following commands  Decreased range of motion on lower extremities     Psychiatric:         Mood and Affect: Mood normal.         Laboratory:  Recent Labs     06/08/25  1000   WBC 16.1*   RBC 3.79*   HEMOGLOBIN 9.9*   HEMATOCRIT 32.1*   MCV 84.7   MCH 26.1*   MCHC 30.8*   RDW 52.2*   PLATELETCT 433   MPV 9.3     Recent Labs     06/08/25  1000   SODIUM 136   POTASSIUM 3.3*   CHLORIDE 100   CO2 18*   GLUCOSE 173*   BUN 22   CREATININE 1.40   CALCIUM 8.8     Recent Labs     06/08/25  1000   ALTSGPT 39   ASTSGOT 49*   ALKPHOSPHAT 142*   TBILIRUBIN 0.6   GLUCOSE 173*         Recent Labs     06/08/25  1000   NTPROBNP 2301*         Recent Labs     06/08/25  1000   TROPONINT 85*       Imaging:  DX-CHEST-PORTABLE (1 VIEW)   Final Result         1. No acute abnormalities evident.      2. Very  severe left shoulder arthritis unchanged.                         X-Ray:  My impression is: Chest x-ray my interpretation did not show any acute normalities.    Assessment/Plan:  Justification for Admission Status  I anticipate this patient will require at least two midnights for appropriate medical management, necessitating inpatient admission because for patient atrial fibrillation with rapid ventricular response that required electrocardioversion.  He also found to have infection on his lower extremities I started him on antibiotic and follow-up wound culture results.    Patient will need a Telemetry bed on MEDICAL service .  The need is secondary to atrial fibrillation with rapid ventricular response.    * Lactic acidosis- (present on admission)  Assessment & Plan  Patient found to have lactic acidosis most likely secondary infection.  I ordered sepsis order set up  Trend lactic acid level.      Full code status  Assessment & Plan  I was unable to discuss code results with patient as patient was somnolent during my evaluation.  I saw her last 2 time patient was DNR prior to that he was full code.  I placed full code orders as I do not have a POLST.  Plan is to discuss with him CODE STATUS once he is less lethargic.    Elevated troponin  Assessment & Plan  Patient found to have atrial fibrillation with rapid events response that can lead to elevated troponin.  I ordered repeat troponin level.  Ordered echocardiogram.  Optimize electrolytes.      Open wound of both lower extremities  Assessment & Plan  Patient found to have wounds on bilateral lower extremities.  Ordered wound culture and Gram stain.  Requested wound care consultation.  He received a dose of vancomycin.  He was started on vancomycin at the skilled nursing facility         I started him on Unasyn pain  He may get benefit from infectious disease consultation.      Atrial fibrillation with rapid ventricular response (HCC)  Assessment & Plan  Patient  found to have atrial fibrillation with rapid ventricular response  Patient underwent electrocardioversion 200 J.  Patient remains atrial fibrillation.  He is not on any rate control medication I started him on metoprolol  Optimize electrolytes   Admit to telemetry.    Severe sepsis (HCC)- (present on admission)  Assessment & Plan  This is Sepsis Present on admission  SIRS criteria identified on my evaluation include: Tachycardia, with heart rate greater than 90 BPM, Tachypnea, with respirations greater than 20 per minute, and Leukocytosis, with WBC greater than 12,000  Clinical indicators of end organ dysfunction include Lactic Acid greater than 2  Source is cellulitis  Sepsis protocol initiated  Crystalloid Fluid Administration: Resuscitation volume of 1 L bolus ordered. Reason that resuscitation volume of less than 30ml/kg was ordered concern for fluid overload  IV antibiotics as appropriate for source of sepsis  Reassessment: I have reassessed the patient's hemodynamic status    Patient received dose of vancomycin in the ER and that is good for till tomorrow.  He also received a dose of Zosyn in the ER.  I discussed with pharmacist and I started him on Unasyn.  Change antibiotic as per culture results and patient clinical condition.  I discontinued vancomycin to avoid adverse effect associated with vancomycin.    Metastatic lung cancer (metastasis from lung to other site), right (HCC)  Assessment & Plan  Outpatient follow-up.    Rectal cancer (HCC)- (present on admission)  Assessment & Plan  Outpatient follow-up  Ostomy      I discussed about this admission with ER physician Dr. Lee    Patient is critically ill.   The patient continues to have: Severe sepsis, atrial fibrillation with rapid ventricular response  The vital organ system that is affected is the: Cardiovascular system  If untreated there is a high chance of deterioration into: Cardiovascular collapse  And eventually death.   The critical care that I  "am providing today is: I am continuing antibiotic and monitoring hemodynamically stability after electrical cardioversion.  Trend lactic acid every 4 hourly.  Severe sepsis.  The critical that has been undertaken is medically complex.   There has been no overlap in critical care time.   Critical Care Time not including procedures: 39 minutes      VTE prophylaxis: therapeutic anticoagulation with apixaban       [1]   Allergies  Allergen Reactions    Gabapentin Unspecified     \"Dizziness\"  On MAR from pharmacy      "

## 2025-06-08 NOTE — ASSESSMENT & PLAN NOTE
Pt with poor understanding of code status discussion on my evaluation. Given previous documentation of his CODE status being DNR/I we will revert to his previous wishes. Can continue to readdress and attempt to get a POLST completed

## 2025-06-08 NOTE — ASSESSMENT & PLAN NOTE
Patient found to have atrial fibrillation with rapid ventricular response  Patient underwent electrocardioversion 200 J.  Patient remains atrial fibrillation.  Will optimize electrolytes, continue beta-blocker and Eliquis

## 2025-06-08 NOTE — ASSESSMENT & PLAN NOTE
This is Sepsis Present on admission  SIRS criteria identified on my evaluation include: Tachycardia, with heart rate greater than 90 BPM, Tachypnea, with respirations greater than 20 per minute, and Leukocytosis, with WBC greater than 12,000  Clinical indicators of end organ dysfunction include Lactic Acid greater than 2  Source is cellulitis  Sepsis protocol initiated  Crystalloid Fluid Administration: Resuscitation volume of 1 L bolus ordered. Reason that resuscitation volume of less than 30ml/kg was ordered concern for fluid overload  IV antibiotics as appropriate for source of sepsis  Reassessment: I have reassessed the patient's hemodynamic status    Patient received dose of vancomycin in the ER and that is good for till tomorrow.  He also received a dose of Zosyn in the ER.  Resume zosyn and start linezolid secondary to previous Cx results, ID following

## 2025-06-08 NOTE — ASSESSMENT & PLAN NOTE
Patient found to have atrial fibrillation with rapid events response that can lead to elevated troponin.  I ordered repeat troponin level.  Patient denied any chest pain

## 2025-06-09 ENCOUNTER — APPOINTMENT (OUTPATIENT)
Dept: RADIOLOGY | Facility: MEDICAL CENTER | Age: 75
DRG: 871 | End: 2025-06-09
Attending: STUDENT IN AN ORGANIZED HEALTH CARE EDUCATION/TRAINING PROGRAM
Payer: COMMERCIAL

## 2025-06-09 PROBLEM — Z71.89 ADVANCED CARE PLANNING/COUNSELING DISCUSSION: Status: ACTIVE | Noted: 2025-06-08

## 2025-06-09 LAB
ALBUMIN SERPL BCP-MCNC: 2.4 G/DL (ref 3.2–4.9)
ALBUMIN/GLOB SERPL: 0.6 G/DL
ALP SERPL-CCNC: 125 U/L (ref 30–99)
ALT SERPL-CCNC: 33 U/L (ref 2–50)
ANION GAP SERPL CALC-SCNC: 13 MMOL/L (ref 7–16)
AST SERPL-CCNC: 38 U/L (ref 12–45)
BILIRUB SERPL-MCNC: 0.5 MG/DL (ref 0.1–1.5)
BUN SERPL-MCNC: 26 MG/DL (ref 8–22)
CALCIUM ALBUM COR SERPL-MCNC: 9.7 MG/DL (ref 8.5–10.5)
CALCIUM SERPL-MCNC: 8.4 MG/DL (ref 8.5–10.5)
CHLORIDE SERPL-SCNC: 103 MMOL/L (ref 96–112)
CO2 SERPL-SCNC: 21 MMOL/L (ref 20–33)
CREAT SERPL-MCNC: 1.53 MG/DL (ref 0.5–1.4)
ERYTHROCYTE [DISTWIDTH] IN BLOOD BY AUTOMATED COUNT: 51.8 FL (ref 35.9–50)
GFR SERPLBLD CREATININE-BSD FMLA CKD-EPI: 48 ML/MIN/1.73 M 2
GLOBULIN SER CALC-MCNC: 3.9 G/DL (ref 1.9–3.5)
GLUCOSE BLD STRIP.AUTO-MCNC: 123 MG/DL (ref 65–99)
GLUCOSE BLD STRIP.AUTO-MCNC: 128 MG/DL (ref 65–99)
GLUCOSE BLD STRIP.AUTO-MCNC: 215 MG/DL (ref 65–99)
GLUCOSE BLD STRIP.AUTO-MCNC: 220 MG/DL (ref 65–99)
GLUCOSE SERPL-MCNC: 137 MG/DL (ref 65–99)
HCT VFR BLD AUTO: 26.6 % (ref 42–52)
HGB BLD-MCNC: 8.4 G/DL (ref 14–18)
LACTATE SERPL-SCNC: 1.4 MMOL/L (ref 0.5–2)
LACTATE SERPL-SCNC: 1.5 MMOL/L (ref 0.5–2)
MAGNESIUM SERPL-MCNC: 1.7 MG/DL (ref 1.5–2.5)
MCH RBC QN AUTO: 26.8 PG (ref 27–33)
MCHC RBC AUTO-ENTMCNC: 31.6 G/DL (ref 32.3–36.5)
MCV RBC AUTO: 84.7 FL (ref 81.4–97.8)
PLATELET # BLD AUTO: 387 K/UL (ref 164–446)
PMV BLD AUTO: 9.2 FL (ref 9–12.9)
POTASSIUM SERPL-SCNC: 3.7 MMOL/L (ref 3.6–5.5)
PROT SERPL-MCNC: 6.3 G/DL (ref 6–8.2)
RBC # BLD AUTO: 3.14 M/UL (ref 4.7–6.1)
SODIUM SERPL-SCNC: 137 MMOL/L (ref 135–145)
WBC # BLD AUTO: 14.1 K/UL (ref 4.8–10.8)

## 2025-06-09 PROCEDURE — 80053 COMPREHEN METABOLIC PANEL: CPT

## 2025-06-09 PROCEDURE — A9270 NON-COVERED ITEM OR SERVICE: HCPCS | Performed by: STUDENT IN AN ORGANIZED HEALTH CARE EDUCATION/TRAINING PROGRAM

## 2025-06-09 PROCEDURE — 82962 GLUCOSE BLOOD TEST: CPT | Mod: 91

## 2025-06-09 PROCEDURE — 700111 HCHG RX REV CODE 636 W/ 250 OVERRIDE (IP): Mod: JZ | Performed by: INTERNAL MEDICINE

## 2025-06-09 PROCEDURE — 700105 HCHG RX REV CODE 258: Performed by: INTERNAL MEDICINE

## 2025-06-09 PROCEDURE — 700111 HCHG RX REV CODE 636 W/ 250 OVERRIDE (IP): Mod: JZ | Performed by: STUDENT IN AN ORGANIZED HEALTH CARE EDUCATION/TRAINING PROGRAM

## 2025-06-09 PROCEDURE — 700102 HCHG RX REV CODE 250 W/ 637 OVERRIDE(OP): Performed by: INTERNAL MEDICINE

## 2025-06-09 PROCEDURE — A9270 NON-COVERED ITEM OR SERVICE: HCPCS | Performed by: INTERNAL MEDICINE

## 2025-06-09 PROCEDURE — 770020 HCHG ROOM/CARE - TELE (206)

## 2025-06-09 PROCEDURE — 99223 1ST HOSP IP/OBS HIGH 75: CPT | Performed by: INTERNAL MEDICINE

## 2025-06-09 PROCEDURE — 99233 SBSQ HOSP IP/OBS HIGH 50: CPT | Performed by: STUDENT IN AN ORGANIZED HEALTH CARE EDUCATION/TRAINING PROGRAM

## 2025-06-09 PROCEDURE — 83605 ASSAY OF LACTIC ACID: CPT | Mod: 91

## 2025-06-09 PROCEDURE — 83735 ASSAY OF MAGNESIUM: CPT

## 2025-06-09 PROCEDURE — 93925 LOWER EXTREMITY STUDY: CPT

## 2025-06-09 PROCEDURE — 97535 SELF CARE MNGMENT TRAINING: CPT

## 2025-06-09 PROCEDURE — 36415 COLL VENOUS BLD VENIPUNCTURE: CPT

## 2025-06-09 PROCEDURE — 700102 HCHG RX REV CODE 250 W/ 637 OVERRIDE(OP): Performed by: STUDENT IN AN ORGANIZED HEALTH CARE EDUCATION/TRAINING PROGRAM

## 2025-06-09 PROCEDURE — 85027 COMPLETE CBC AUTOMATED: CPT

## 2025-06-09 PROCEDURE — 700105 HCHG RX REV CODE 258: Performed by: STUDENT IN AN ORGANIZED HEALTH CARE EDUCATION/TRAINING PROGRAM

## 2025-06-09 RX ORDER — LINEZOLID 600 MG/1
600 TABLET, FILM COATED ORAL EVERY 12 HOURS
Status: DISCONTINUED | OUTPATIENT
Start: 2025-06-09 | End: 2025-06-10

## 2025-06-09 RX ADMIN — ACETAMINOPHEN 650 MG: 325 TABLET ORAL at 09:35

## 2025-06-09 RX ADMIN — INSULIN LISPRO 1 UNITS: 100 INJECTION, SOLUTION INTRAVENOUS; SUBCUTANEOUS at 16:41

## 2025-06-09 RX ADMIN — OXYCODONE 5 MG: 5 TABLET ORAL at 06:33

## 2025-06-09 RX ADMIN — LINEZOLID 600 MG: 600 TABLET, FILM COATED ORAL at 16:36

## 2025-06-09 RX ADMIN — PIPERACILLIN AND TAZOBACTAM 3.38 G: 3; .375 INJECTION, POWDER, FOR SOLUTION INTRAVENOUS at 14:00

## 2025-06-09 RX ADMIN — APIXABAN 5 MG: 5 TABLET, FILM COATED ORAL at 16:36

## 2025-06-09 RX ADMIN — APIXABAN 5 MG: 5 TABLET, FILM COATED ORAL at 05:16

## 2025-06-09 RX ADMIN — AMPICILLIN AND SULBACTAM 3 G: 1; 2 INJECTION, POWDER, FOR SOLUTION INTRAMUSCULAR; INTRAVENOUS at 05:16

## 2025-06-09 RX ADMIN — OXYCODONE 5 MG: 5 TABLET ORAL at 23:04

## 2025-06-09 RX ADMIN — TAMSULOSIN HYDROCHLORIDE 0.4 MG: 0.4 CAPSULE ORAL at 05:16

## 2025-06-09 RX ADMIN — OXYCODONE 5 MG: 5 TABLET ORAL at 16:36

## 2025-06-09 RX ADMIN — INSULIN LISPRO 2 UNITS: 100 INJECTION, SOLUTION INTRAVENOUS; SUBCUTANEOUS at 11:29

## 2025-06-09 RX ADMIN — ATORVASTATIN CALCIUM 20 MG: 20 TABLET, FILM COATED ORAL at 16:37

## 2025-06-09 RX ADMIN — AMPICILLIN AND SULBACTAM 3 G: 1; 2 INJECTION, POWDER, FOR SOLUTION INTRAMUSCULAR; INTRAVENOUS at 11:21

## 2025-06-09 RX ADMIN — LINEZOLID 600 MG: 600 TABLET, FILM COATED ORAL at 08:22

## 2025-06-09 RX ADMIN — INSULIN LISPRO 2 UNITS: 100 INJECTION, SOLUTION INTRAVENOUS; SUBCUTANEOUS at 21:40

## 2025-06-09 RX ADMIN — SODIUM BICARBONATE 650 MG: 650 TABLET ORAL at 06:33

## 2025-06-09 RX ADMIN — SODIUM BICARBONATE 650 MG: 650 TABLET ORAL at 16:36

## 2025-06-09 RX ADMIN — SODIUM CHLORIDE: 9 INJECTION, SOLUTION INTRAVENOUS at 00:00

## 2025-06-09 RX ADMIN — METOPROLOL TARTRATE 25 MG: 25 TABLET, FILM COATED ORAL at 16:37

## 2025-06-09 RX ADMIN — METOPROLOL TARTRATE 25 MG: 25 TABLET, FILM COATED ORAL at 05:16

## 2025-06-09 RX ADMIN — PIPERACILLIN AND TAZOBACTAM 3.38 G: 3; .375 INJECTION, POWDER, FOR SOLUTION INTRAVENOUS at 17:37

## 2025-06-09 ASSESSMENT — COGNITIVE AND FUNCTIONAL STATUS - GENERAL
SUGGESTED CMS G CODE MODIFIER MOBILITY: CL
WALKING IN HOSPITAL ROOM: A LOT
PERSONAL GROOMING: A LITTLE
TURNING FROM BACK TO SIDE WHILE IN FLAT BAD: TOTAL
MOBILITY SCORE: 6
TOILETING: A LOT
MOVING TO AND FROM BED TO CHAIR: TOTAL
TURNING FROM BACK TO SIDE WHILE IN FLAT BAD: A LITTLE
STANDING UP FROM CHAIR USING ARMS: A LOT
MOVING FROM LYING ON BACK TO SITTING ON SIDE OF FLAT BED: A LITTLE
HELP NEEDED FOR BATHING: A LITTLE
SUGGESTED CMS G CODE MODIFIER DAILY ACTIVITY: CK
SUGGESTED CMS G CODE MODIFIER MOBILITY: CN
WALKING IN HOSPITAL ROOM: TOTAL
CLIMB 3 TO 5 STEPS WITH RAILING: TOTAL
DRESSING REGULAR LOWER BODY CLOTHING: A LITTLE
DAILY ACTIVITIY SCORE: 16
STANDING UP FROM CHAIR USING ARMS: TOTAL
CLIMB 3 TO 5 STEPS WITH RAILING: TOTAL
MOVING FROM LYING ON BACK TO SITTING ON SIDE OF FLAT BED: TOTAL
DRESSING REGULAR UPPER BODY CLOTHING: A LOT
EATING MEALS: A LITTLE
MOBILITY SCORE: 13
MOVING TO AND FROM BED TO CHAIR: A LOT

## 2025-06-09 ASSESSMENT — PAIN DESCRIPTION - PAIN TYPE
TYPE: ACUTE PAIN

## 2025-06-09 ASSESSMENT — ENCOUNTER SYMPTOMS
NAUSEA: 0
COUGH: 0
ABDOMINAL PAIN: 0
MEMORY LOSS: 1
MYALGIAS: 1
NERVOUS/ANXIOUS: 0
CONSTIPATION: 0
CHILLS: 1
DIARRHEA: 0
SHORTNESS OF BREATH: 0
FEVER: 0
WEAKNESS: 1
CHILLS: 0
VOMITING: 0

## 2025-06-09 ASSESSMENT — FIBROSIS 4 INDEX: FIB4 SCORE: 1.23

## 2025-06-09 NOTE — CARE PLAN
Problem: Pain - Standard  Goal: Alleviation of pain or a reduction in pain to the patient’s comfort goal  Outcome: Progressing     Problem: Knowledge Deficit - Standard  Goal: Patient and family/care givers will demonstrate understanding of plan of care, disease process/condition, diagnostic tests and medications  Outcome: Progressing     Problem: Respiratory  Goal: Patient will achieve/maintain optimum respiratory ventilation and gas exchange  Outcome: Progressing   The patient is Stable - Low risk of patient condition declining or worsening    Shift Goals  Clinical Goals: Pain control, Cardiac monitoring, VSS, Safety, Education  Patient Goals: Rest  Family Goals: DIANNA    Progress made toward(s) clinical / shift goals:      Patient is not progressing towards the following goals:

## 2025-06-09 NOTE — PROGRESS NOTES
4 Eyes Skin Assessment Completed by MIKE Virgen and MIKE Aparicio.    Skin assessment is primarily focused on high risk bony prominences. Pay special attention to skin beneath and around medical devices, high risk bony prominences, skin to skin areas and areas where the patient lacks sensation to feel pain and areas where the patient previously had breakdown.     Head (Occipital):  WDL   Ears (Under Medical Devices): WDL   Nose (Under Medical Devices): WDL   Mouth:  WDL   Neck: WDL   Breast/Chest:  WDL   Shoulder Blades:  WDL   Spine:   WDL   (R) Arm/Elbow/Hand: Purple/maroon and Bruising   (L) Arm/Elbow/Hand: Bruising   Abdomen: WDL   Pannus/Groin:  WDL   Sacrum/Coccyx:   Open wound, Red, and Bleeding   (R) Ischial Tuberosity (Sit Bones):  WDL   (L) Ischial Tuberosity (Sit Bones):  WDL   (R) Leg:  Open wound, Ulcer of lower extremity or foot, Red, and Bleeding   (L) Leg:  Open wound, Ulcer of lower extremity or foot, Red, and Bleeding   (R) Heel:  **Wound/discoloration of bony prominence (Suspected Pressure injury)**, Red, and Purple/maroon   (R) Foot/Toe: Red and Purple/maroon   (L) Heel: **Wound/discoloration of bony prominence (Suspected Pressure injury)**, Red, and Purple/maroon   (L) Foot/Toe:  Red and Purple/maroon       DEVICES IN USE:   Respiratory Devices:  NA, patient on room air  Feeding Devices:  N/A   Lines & BP Monitoring Devices:  Peripheral IV, BP cuff, and Pulse ox    Orthopedic Devices:  N/A  Miscellaneous Devices:  Drains and Schulte    PROTOCOL INTERVENTIONS:   Low Airloss Bed:  Already in place  Elbows Padded with Offloading Dressing:  Already in place  Offloading Dressing - Sacrum:  Already in place  Offloading Dressing - Heel:  Already in place  Heel Float Boots:  Already in place  Q2 Turns with Wedges:  Already in place  Schulte:  Already in place    WOUND PHOTOS:   Completed and in EPIC     WOUND CONSULT:   Wound team already following area(s) of concern

## 2025-06-09 NOTE — PROGRESS NOTES
Patient needed to be turned.  Patient refused.  Pt requested to have bed turned towards window to be able to see the [place].  Patient educated about the importance of turning him due to his wounds but patient refused.  RN and CNA tried to turn patient but patient refused.

## 2025-06-09 NOTE — PROGRESS NOTES
Hospital Medicine Daily Progress Note    Date of Service  6/9/2025    Chief Complaint  Sebastian Sinha is a 74 y.o. male admitted 6/8/2025 with tachycardia    Hospital Course  Sebastian Sinha is a 74 y.o. male with past medical history of type 2 diabetes mellitus, chronic kidney disease atrial fibrillation on apixaban who presented to the hospital on 6/8/2025 from [facility] after he found to have heart rate in 180s.  As per report patient was started on antibiotic vancomycin and Zosyn for his lower extremities cellulitis and he received PICC line at the [facility] nursing [facility].  On initial evaluation patient was unable to provide medical information.  I asking why he is here in the hospital and he told me that he comes here so frequently that he forgot why he came this time.  He reported pain in his bilateral lower extremities.     HPI is very limited as patient is a poor historian     ER course: Patient found to have atrial fibrillation with RVR and he underwent synchronized cardioversion 200 J after sedation.  Patient remains in A-fib post cardioversion but rate improved.  He received a dose of vancomycin in the ER and I checked with pharmacist patient was started on antibiotic most likely last night for his wound on his lower extremities. He was started on unasyn and admitted for further evaluation.     Interval Problem Update  Pt seen and examined, he feels poorly, notes leg discomfort. States he has chronic wound and chronic UTI. Unsure of what happened over the last 24 hours. Denies chest pain or SOB.   - tele reviewed, rate controlled A.fib. will hold on echo given known hx of A.fib. RVR likely result of infection   - Cr 1.53m basaline 1.2-1.8, monitor   - blood Cx thus far negative. Previous leg Cx with MRSA, E.coli and Klebseilla with intermediate and resistant patterns. I have consulted with ID for help with antibiotic choice and duration. I have changed abx to zosyn and linezolid in the mean time to cover    - follow up Cx results   - wound care       I have discussed this patient's plan of care and discharge plan at IDT rounds today with Case Management, Nursing, Nursing leadership, and other members of the IDT team.    Consultants/Specialty  infectious disease    Code Status  DNAR/DNI    Disposition  The patient is not medically cleared for discharge to home or a post-acute facility.  Anticipate discharge to: skilled nursing facility    I have placed the appropriate orders for post-discharge needs.    Review of Systems  Review of Systems   Constitutional:  Positive for chills and malaise/fatigue. Negative for fever.   Respiratory:  Negative for shortness of breath.    Cardiovascular:  Positive for leg swelling. Negative for chest pain.   Gastrointestinal:  Negative for nausea and vomiting.   Genitourinary:  Negative for dysuria.   Musculoskeletal:  Positive for joint pain.   Neurological:  Positive for weakness.   Psychiatric/Behavioral:  Positive for memory loss.         Physical Exam  Temp:  [36.3 °C (97.3 °F)-36.4 °C (97.5 °F)] 36.4 °C (97.5 °F)  Pulse:  [] 74  Resp:  [9-32] 16  BP: ()/(39-79) 91/39  SpO2:  [95 %-100 %] 98 %    Physical Exam  Vitals and nursing note reviewed.   Constitutional:       General: He is not in acute distress.     Appearance: He is ill-appearing.      Comments: Frail elderly male, chronically ill appearing    HENT:      Head: Normocephalic and atraumatic.      Mouth/Throat:      Pharynx: Oropharynx is clear.   Eyes:      Conjunctiva/sclera: Conjunctivae normal.   Cardiovascular:      Rate and Rhythm: Normal rate. Rhythm irregular.   Pulmonary:      Effort: Pulmonary effort is normal.   Abdominal:      General: Bowel sounds are normal.      Palpations: Abdomen is soft.   Musculoskeletal:         General: Swelling, tenderness and signs of injury present.      Right lower leg: Edema present.      Left lower leg: Edema present.   Skin:     General: Skin is warm.      Coloration:  Skin is pale.   Neurological:      Mental Status: He is alert. He is disoriented.   Psychiatric:         Mood and Affect: Mood normal.         Behavior: Behavior normal.           Fluids    Intake/Output Summary (Last 24 hours) at 6/9/2025 1349  Last data filed at 6/9/2025 1152  Gross per 24 hour   Intake 777.57 ml   Output --   Net 777.57 ml        Laboratory  Recent Labs     06/08/25  1000 06/09/25  0042   WBC 16.1* 14.1*   RBC 3.79* 3.14*   HEMOGLOBIN 9.9* 8.4*   HEMATOCRIT 32.1* 26.6*   MCV 84.7 84.7   MCH 26.1* 26.8*   MCHC 30.8* 31.6*   RDW 52.2* 51.8*   PLATELETCT 433 387   MPV 9.3 9.2     Recent Labs     06/08/25  1000 06/09/25  0042   SODIUM 136 137   POTASSIUM 3.3* 3.7   CHLORIDE 100 103   CO2 18* 21   GLUCOSE 173* 137*   BUN 22 26*   CREATININE 1.40 1.53*   CALCIUM 8.8 8.4*                   Imaging  DX-CHEST-PORTABLE (1 VIEW)   Final Result         1. No acute abnormalities evident.      2. Very severe left shoulder arthritis unchanged.                     US-EXTREMITY ARTERY LOWER BILAT W/TERRIE (COMBO)    (Results Pending)        Assessment/Plan  * Lactic acidosis- (present on admission)  Assessment & Plan  Patient found to have lactic acidosis most likely secondary infection.  I ordered sepsis order set up  Trend lactic acid level.      Advanced care planning/counseling discussion  Assessment & Plan  Pt with poor understanding of code status discussion on my evaluation. Given previous documentation of his CODE status being DNR/I we will revert to his previous wishes. Can continue to readdress and attempt to get a POLST completed    Elevated troponin  Assessment & Plan  Patient found to have atrial fibrillation with rapid events response that can lead to elevated troponin.  I ordered repeat troponin level.  Ordered echocardiogram.  Optimize electrolytes.      Open wound of both lower extremities  Assessment & Plan  Patient found to have chronic wounds on bilateral lower extremities.  Ordered wound culture  and Gram stain.  Requested wound care consultation.  Based on previous Cx, abx changed to zosyn and linezolid.   I have consulted and discussed case with ID appreciate further recommendations  Of ordered arterial ultrasound with TERRIE      Atrial fibrillation with rapid ventricular response (HCC)  Assessment & Plan  Patient found to have atrial fibrillation with rapid ventricular response  Patient underwent electrocardioversion 200 J.  Patient remains atrial fibrillation.  He is not on any rate control medication, he was started on on metoprolol  Optimize electrolytes   Continue tele   Hold on echo given this is not new dx unless he has clinical decline and echo is needed    Type 2 diabetes mellitus with diabetic polyneuropathy, without long-term current use of insulin (HCC)- (present on admission)  Assessment & Plan  Type II diaebtes   Continue ISS, hypoglycemic protocol  DM diet      Severe sepsis (HCC)- (present on admission)  Assessment & Plan  This is Sepsis Present on admission  SIRS criteria identified on my evaluation include: Tachycardia, with heart rate greater than 90 BPM, Tachypnea, with respirations greater than 20 per minute, and Leukocytosis, with WBC greater than 12,000  Clinical indicators of end organ dysfunction include Lactic Acid greater than 2  Source is cellulitis  Sepsis protocol initiated  Crystalloid Fluid Administration: Resuscitation volume of 1 L bolus ordered. Reason that resuscitation volume of less than 30ml/kg was ordered concern for fluid overload  IV antibiotics as appropriate for source of sepsis  Reassessment: I have reassessed the patient's hemodynamic status    Patient received dose of vancomycin in the ER and that is good for till tomorrow.  He also received a dose of Zosyn in the ER.  Resume zosyn and start linezolid secondary to previous Cx results, I have consulted and discussed with ID, appreciate further recs    Metastatic lung cancer (metastasis from lung to other site),  right (HCC)  Assessment & Plan  Outpatient follow-up.    Rectal cancer (HCC)- (present on admission)  Assessment & Plan  Outpatient follow-up  Ostomy         VTE prophylaxis:    therapeutic anticoagulation with eliquis 5 mg BID      I have performed a physical exam and reviewed and updated ROS and Plan today (6/9/2025). In review of yesterday's note (6/8/2025), there are no changes except as documented above.    Patient is has a high medical complexity, complex decision making and is at high risk for complication, morbidity, and mortality.    Greater than 51 minutes spent prepping to see patient (e.g. review of tests) obtaining and/or reviewing separately obtained history. Performing a medically appropriate examination and/ evaluation.  Counseling and educating the patient/family/caregiver.  Ordering medications, tests, or procedures.  Referring and communicating with other health care professionals.  Documenting clinical information in EPIC.  Independently interpreting results and communicating results to patient/family/caregiver.  Care coordination.

## 2025-06-09 NOTE — CARE PLAN
The patient is Stable - Low risk of patient condition declining or worsening    Shift Goals  Clinical Goals: Pain control, Cardiac monitoring, VSS, Safety, Education  Patient Goals: Rest  Family Goals: DIANNA    Progress made toward(s) clinical / shift goals:    Problem: Hemodynamics  Goal: Patient's hemodynamics, fluid balance and neurologic status will be stable or improve  Outcome: Progressing     Problem: Fluid Volume  Goal: Fluid volume balance will be maintained  Outcome: Progressing     Problem: Respiratory  Goal: Patient will achieve/maintain optimum respiratory ventilation and gas exchange  Outcome: Progressing       Patient is not progressing towards the following goals:      Problem: Pain - Standard  Goal: Alleviation of pain or a reduction in pain to the patient’s comfort goal  Outcome: Not Progressing     Problem: Knowledge Deficit - Standard  Goal: Patient and family/care givers will demonstrate understanding of plan of care, disease process/condition, diagnostic tests and medications  Outcome: Not Progressing     Problem: Skin Integrity  Goal: Skin integrity is maintained or improved  Outcome: Not Progressing

## 2025-06-09 NOTE — HOSPITAL COURSE
This is a 74 y.o. male with past medical history of type 2 diabetes mellitus with glyco of 9.0, chronic kidney disease atrial fibrillation on apixaban who presented to the hospital on 6/8/2025 from [facility] [place] after he found to have heart rate in 180s.      As per report patient was started on antibiotic vancomycin and Zosyn for his lower extremities cellulitis and he received PICC line at the skilled nursing facility. In ER, Patient found to have atrial fibrillation with RVR and he underwent synchronized cardioversion 200 J after sedation.  Patient remains in A-fib post cardioversion but rate improved.      Patient stated that he does have chronic wound with infection, wound care evaluated, appreciated  recommendation, blood culture x 2 no growth to date, previous culture growing MRSA, E. coli, Klebsiella.  ID has been consulted, currently patient is on IV Zosyn and Zyvox.  ID continue to follow during the stay in the hospital.    Later in the day, Palliative consulted and after an extensive discussion with the patient in regards to the CODE STATUS and  goals of care discussion, patient opted for comfort measures and hospice.    Hospice referral is place and patient patient will be discharged to skilled nursing facility on hospice/comfort measures. Hospice agency will follow the patient as soon as he is in skilled nursing facility.    Interval events:  -- Patient is alert, awake, answering questions appropriately, vital sign has been reviewed, patient noted to remain hypotensive,, will monitor, continue IV antibiotics including Zyvox and Zosyn.  Patient remains on Zyvox and Zosyn, discharge, CMP on a daily basis.  Patient noted to be in atrial fibrillation, rate is well-controlled after initial cardioversion, currently on metoprolol 25 mg p.o. twice daily along with Eliquis 5 mg p.o. twice daily.  --Labs reviewed, potassium at 3.4, creatinine at 1.5  --WBC elevated at 12.9, hemoglobin 8.2, monitor    Artery  ultrasound was obtained, noted to have normal bilateral lower extremity arterial ultrasound

## 2025-06-09 NOTE — THERAPY
"Physical Therapy Contact Note    Patient Name: Sebastian Sinha  Age:  74 y.o., Sex:  male  Medical Record #: [id number]  Today's Date: 6/9/2025    Pt is a 74 year old male admitted with afib with RVR now s/p cardioversion. He was also found to be septic 2/2 LE cellulitis. PMH includes metastatic lung cancer, rectal cancer, DM2, CKD, afib. Therapist spoke with pt at bedside. Pt reported he has been at [facility] for 6 months. He reports he is able to get out of bed when he wants and walks. After conversation with pt, therapist spent time discussing with  as well as a RN from [facility]. Pt was admitted to [facility] in February 2025. He was initially a rehab patient at [facility] but was discharged in April from PT/OT after \"the therapists tried everything.\" Since, Mr. Sinha has been refusing mobility from [facility] staff despite offering timmy. Upon further chart review, during pts admission in February of this year, he had been living in a ground home and \"had not been out of bed for a while.\" Unfortunately, pt has been in bed for several months and bed bound is likely his new baseline. For this reason, the patient is not a candidate for acute PT services. PT will sign off.    Radha Rollins PT, DPT  "

## 2025-06-09 NOTE — CONSULTS
"Consults  INFECTIOUS DISEASES INPATIENT CONSULT NOTE     Date of Service: 6/9/2025    Consult Requested By: Tania Nugent M.D.    Reason for Consultation: Cellulitis, infected lower extremity wounds    History of Present Illness:   Sebastian Sinha is a 74 y.o.  admitted 6/8/2025. Pt has a past medical history of DMT2, CKD, A-fib on apixaban who presented from [facility] with tachycardia.  Per notes the patient had been diagnosed with cellulitis in his lower extremities and had been started on vancomycin and Zosyn.  In the ER he was found to have A-fib with RVR and underwent cardioversion.      Review Of Systems:  Review of Systems   Constitutional:  Negative for chills and fever.   HENT:  Negative for hearing loss.    Respiratory:  Negative for cough.    Cardiovascular:  Negative for chest pain.   Gastrointestinal:  Negative for abdominal pain, constipation, diarrhea, nausea and vomiting.   Genitourinary:  Negative for dysuria.   Musculoskeletal:  Positive for myalgias. Negative for joint pain.   Skin:  Negative for rash.   Neurological:  Positive for weakness.   Psychiatric/Behavioral:  The patient is not nervous/anxious.        PMH:   Past Medical History[1]    PSH:  Past Surgical History[2]    FAMILY HX:  Family History   Problem Relation Age of Onset    Cancer Mother         colon cancer    COPD Father      Reviewed family history. No pertinent family history.     SOCIAL HX:  Social History     Socioeconomic History    Marital status: Single     Spouse name: Not on file    Number of children: Not on file    Years of education: Not on file    Highest education level: Not on file   Occupational History    Occupation: operating    Tobacco Use    Smoking status: Former     Types: Cigarettes    Smokeless tobacco: Never    Tobacco comments:     \"Rare use 50 years ago, total 3 weeks\"   Vaping Use    Vaping status: Never Used   Substance and Sexual Activity    Alcohol use: No    Drug use: No    Sexual " "activity: Not on file   Other Topics Concern    Not on file   Social History Narrative    Not on file     Social Drivers of Health     Financial Resource Strain: Not on file   Food Insecurity: No Food Insecurity (2025)    Hunger Vital Sign     Worried About Running Out of Food in the Last Year: Never true     Ran Out of Food in the Last Year: Never true   Transportation Needs: No Transportation Needs (2025)    PRAPARE - Transportation     Lack of Transportation (Medical): No     Lack of Transportation (Non-Medical): No   Physical Activity: Not on file   Stress: Not on file   Social Connections: Not on file   Intimate Partner Violence: Not At Risk (2025)    Humiliation, Afraid, Rape, and Kick questionnaire     Fear of Current or Ex-Partner: No     Emotionally Abused: No     Physically Abused: No     Sexually Abused: No   Housing Stability: Low Risk  (2025)    Housing Stability Vital Sign     Unable to Pay for Housing in the Last Year: No     Number of Times Moved in the Last Year: 0     Homeless in the Last Year: No     Tobacco Use History[3]  Social History     Substance and Sexual Activity   Alcohol Use No       Allergies/Intolerances:  Allergies[4]    History reviewed with the patient and /or family member, chart & primary care team    Other Current Medications:  Current Medications[5]  [unfilled]    Most Recent Vital Signs:  BP 91/39   Pulse 74   Temp 36.4 °C (97.5 °F) (Temporal)   Resp 16   Ht 1.778 m (5' 10\")   Wt 90.7 kg (200 lb)   SpO2 98%   BMI 28.70 kg/m²   Temp  Av.4 °C (97.5 °F)  Min: 36.3 °C (97.3 °F)  Max: 36.8 °C (98.3 °F)    Physical Exam:  Physical Exam  Constitutional:       Appearance: Normal appearance.   HENT:      Head: Normocephalic and atraumatic.      Right Ear: External ear normal.      Left Ear: External ear normal.      Nose: Nose normal.      Mouth/Throat:      Mouth: Mucous membranes are moist.      Pharynx: Oropharynx is clear.   Eyes:      " Extraocular Movements: Extraocular movements intact.      Conjunctiva/sclera: Conjunctivae normal.      Pupils: Pupils are equal, round, and reactive to light.   Cardiovascular:      Rate and Rhythm: Normal rate and regular rhythm.      Heart sounds: Normal heart sounds.   Pulmonary:      Effort: Pulmonary effort is normal.      Breath sounds: Normal breath sounds.   Abdominal:      General: Abdomen is flat. Bowel sounds are normal.      Palpations: Abdomen is soft.   Musculoskeletal:      Cervical back: Normal range of motion and neck supple.      Comments: Lower extremity wounds, extensive, erythema, drainage and tenderness.     Skin:     General: Skin is warm.   Neurological:      General: No focal deficit present.      Mental Status: He is alert and oriented to person, place, and time.   Psychiatric:         Mood and Affect: Mood normal.         Behavior: Behavior normal.           Pertinent Lab Results:  Recent Labs     06/08/25  1000 06/09/25  0042   WBC 16.1* 14.1*      Recent Labs     06/08/25  1000 06/09/25  0042   HEMOGLOBIN 9.9* 8.4*   HEMATOCRIT 32.1* 26.6*   MCV 84.7 84.7   MCH 26.1* 26.8*   PLATELETCT 433 387         Recent Labs     06/08/25  1000 06/09/25  0042   SODIUM 136 137   POTASSIUM 3.3* 3.7   CHLORIDE 100 103   CO2 18* 21   CREATININE 1.40 1.53*        Recent Labs     06/08/25  1000 06/09/25  0042   ALBUMIN 2.6* 2.4*        Pertinent Micro:  Results       Procedure Component Value Units Date/Time    Urine Culture (New) [[id number]] Collected: 06/08/25 1215    Order Status: Completed Specimen: Urine, Clean Catch Updated: 06/09/25 1049     Significant Indicator NEG     Source UR     Site URINE, CLEAN CATCH     Culture Result No growth at 24 hours.    CULTURE WOUND W/ GRAM STAIN [[id number]]     Order Status: No result Specimen: Wound from Left Leg     Blood Culture - Draw one from central line and one from peripheral site [[id number]] Collected: 06/08/25 1058    Order Status: Completed Specimen:  "Blood from Line Updated: 06/08/25 1408     Significant Indicator NEG     Source BLD     Site Peripheral     Culture Result No Growth  Note: Blood cultures are incubated for 5 days and  are monitored continuously.Positive blood cultures  are called to the RN and reported as soon as  they are identified.      CoV-2, Flu A/B, And RSV by PCR (Cepheid) [[id number]] Collected: 06/08/25 1215    Order Status: Completed Specimen: Respirate from Nasopharyngeal Updated: 06/08/25 1324     Influenza virus A RNA Negative     Influenza virus B, PCR Negative     RSV, PCR Negative     SARS-CoV-2 by PCR NotDetected     Comment: [facility] providers: PLEASE REFER TO DE-ESCALATION AND RETESTING PROTOCOL  on [email or web address]    **The [employer] GeneXpert Xpress SARS-CoV-2 RT-PCR Test has been made  available for use under the Emergency Use Authorization (EUA) only.          SARS-CoV-2 Source NP Swab    Urinalysis [[id number]]  (Abnormal) Collected: 06/08/25 1215    Order Status: Completed Specimen: Urine, Clean Catch Updated: 06/08/25 1244     Color Dark Yellow     Character Turbid     Specific Gravity 1.022     Ph 5.5     Glucose Negative mg/dL      Ketones 15 mg/dL      Protein 300 mg/dL      Bilirubin Negative     Urobilinogen, Urine 1.0 EU/dL      Nitrite Negative     Leukocyte Esterase Large     Occult Blood Large     Micro Urine Req Microscopic    Blood Culture - Draw one from central line and one from peripheral site [[id number]] Collected: 06/08/25 1000    Order Status: Completed Specimen: Blood from Peripheral Updated: 06/08/25 1208     Significant Indicator NEG     Source BLD     Site PERIPHERAL     Culture Result No Growth  Note: Blood cultures are incubated for 5 days and  are monitored continuously.Positive blood cultures  are called to the RN and reported as soon as  they are identified.      MRSA By PCR (Amp) [[id number]]     Order Status: Sent Specimen: Respirate from Nares           No results found for: \"BLOODCULTU\", \"BLDCULT\", " "\"BCHOLD\"     Studies:  DX-CHEST-PORTABLE (1 VIEW)  Result Date: 6/8/2025 6/8/2025 10:26 AM HISTORY/REASON FOR EXAM:  Sepsis COMPARISON: 3/20/2025. TECHNIQUE/EXAM DESCRIPTION AND NUMBER OF VIEWS: Single portable view of the chest. FINDINGS: Shallow breath. Wires overlie the patient. No suspicious lung abnormality. No pleural effusion or pneumothorax. Severe left shoulder arthritis.     1. No acute abnormalities evident. 2. Very severe left shoulder arthritis unchanged.       ASSESSMENT/PLAN:     74 y.o.  admitted 6/8/2025. Pt has a past medical history of DMT2, CKD, A-fib on apixaban who presented from [facility] with tachycardia.  Per notes the patient had been diagnosed with cellulitis in his lower extremities and had been started on vancomycin and Zosyn.  In the ER he was found to have A-fib with RVR and underwent cardioversion.    Problem List    Leukocytosis on arrival, ongoing  Left lower leg wound, worse, possible gangrene gangrene and concern for superinfection   Right leg wound, not obviously infected, wounds worse   History of infected leg wounds, last wound cultures from 3 of 25 positive for MRSA, E. coli and Klebsiella pneumoniae  CKD   A-fib with RVR, now status post cardioversion, still in A-fib but rate reduced  Anemia    Assessment:      -Notes were extensively reviewed from primary team, radiology, ED, surgery, specialists, etc.   -Reviewed labs to date, microbiology for current admit and prior  -Imaging was independently reviewed and interpreted    Plan:    --- Continue with Zosyn 3.375 g every 8 hours and p.o. linezolid 600 mg bid as based on prior wound cultures.  Recommend a 10-day antibiotic course, end 6/19/25   --- Follow-up blood cultures, no growth to date  --- Agree with additional workup to see if the patient has PAD or because of his ongoing wounds.  Lower extremity ultrasounds are pending.  --- Wound care, patient will likely need long-term consistent wound care which will be critical " "to cover  --- Monitor labs, monitor for any sign of pulmonary suppression on linezolid.  10-day course only so low risk.       Dispo: Awaiting culture results and work-up as above.  Patient is at risk for infectious complications including death.  Recommend eventual discharge back to skilled facility.    PICC: TBD, if patient is discharged to skilled facility okay to place midline      Plan of care discussed with IM Tania Nugent M.D..  ID will follow peripherally and then if no new concerning findings will sign off.     Bonnie Sterling M.D.         [1]   Past Medical History:  Diagnosis Date    A-fib (HCC)     Arthritis     BPH (benign prostatic hyperplasia)     Cellulitis     Diabetes (HCC)     pt does not take his metformin    Kidney disease     stage 3    Rectal cancer (HCC)     Sleep apnea     no CPAP   [2]   Past Surgical History:  Procedure Laterality Date    CATARACT EXTRACTION WITH IOL Bilateral 2020    COLONOSCOPY  01/22/2018    biopsy colonic mucosa anastamosis @ 10cm  positive for moderately diff adenocarcinoma    LOW ANTERIOR RESECTION LAPAROSCOPIC  03/27/2015    (followed by wound infection 4/14/15)    COLONOSCOPY      Aug 2017    OTHER      oral surgery as a child    OTHER ABDOMINAL SURGERY      appendectomy    TOE AMPUTATION Bilateral     R 2nd digit, L 2nd digit   [3]   Social History  Tobacco Use   Smoking Status Former    Types: Cigarettes   Smokeless Tobacco Never   Tobacco Comments    \"Rare use 50 years ago, total 3 weeks\"   [4]   Allergies  Allergen Reactions    Gabapentin Unspecified     \"Dizziness\"  On MAR from pharmacy    [5]   Current Facility-Administered Medications:     linezolid (Zyvox) tablet 600 mg, 600 mg, Oral, Q12HRS, Tania Nugent M.D., 600 mg at 06/09/25 0822    piperacillin-tazobactam (Zosyn) 3.375 g in  mL IVPB, 3.375 g, Intravenous, Once **AND** piperacillin-tazobactam (Zosyn) 3.375 g in  mL IVPB, 3.375 g, Intravenous, Q8HRS, Tania Nugent M.D.    " acetaminophen (Tylenol) tablet 650 mg, 650 mg, Oral, Q6HRS PRN, Lisa Greene M.D., 650 mg at 06/09/25 0935    senna-docusate (Pericolace Or Senokot S) 8.6-50 MG per tablet 2 Tablet, 2 Tablet, Oral, Q EVENING, 2 Tablet at 06/08/25 1806 **AND** polyethylene glycol/lytes (Miralax) Packet 1 Packet, 1 Packet, Oral, QDAY PRN, Lisa Greene M.D.    labetalol (Normodyne/Trandate) injection 10 mg, 10 mg, Intravenous, Q4HRS PRN, Lisa Greene M.D.    ondansetron (Zofran) syringe/vial injection 4 mg, 4 mg, Intravenous, Q4HRS PRN **OR** ondansetron (Zofran ODT) dispertab 4 mg, 4 mg, Oral, Q4HRS PRN, Lisa Greene M.D.    insulin lispro (HumaLOG,AdmeLOG) subcutaneous injection, 1-6 Units, Subcutaneous, 4X/DAY ACHS, 2 Units at 06/09/25 1129 **AND** POC blood glucose manual result, , , Q AC AND BEDTIME(S) **AND** [COMPLETED] NOTIFY MD and PharmD, , , Once **AND** Administer 20 grams of glucose (approximately 8 ounces of fruit juice) every 15 minutes PRN FSBG less than 70 mg/dL, , , PRN **AND** dextrose 50 % (D50W) injection 25 g, 25 g, Intravenous, Q15 MIN PRN, Lisa Greene M.D.    apixaban (Eliquis) tablet 5 mg, 5 mg, Oral, BID, Lisa Greene M.D., 5 mg at 06/09/25 0516    atorvastatin (Lipitor) tablet 20 mg, 20 mg, Oral, Q EVENING, Lisa Greene M.D., 20 mg at 06/08/25 1807    sodium bicarbonate tablet 650 mg, 650 mg, Oral, BID WITH MEALS, Lisa Greene M.D., 650 mg at 06/09/25 0633    tamsulosin (Flomax) capsule 0.4 mg, 0.4 mg, Oral, DAILY, Lisa Greene M.D., 0.4 mg at 06/09/25 0516    baclofen (Lioresal) tablet 2.5 mg, 2.5 mg, Oral, Q8HRS PRN, Lisa Greene M.D.    oxyCODONE immediate-release (Roxicodone) tablet 5 mg, 5 mg, Oral, Q6HRS PRN, Lisa Greene M.D., 5 mg at 06/09/25 0633    metoprolol tartrate (Lopressor) tablet 25 mg, 25 mg, Oral, BID, Lisa Greene M.D., 25 mg at 06/09/25 0516    LR (Bolus) infusion 500  mL, 500 mL, Intravenous, Once PRN, Lisa Greene M.D.    NS infusion, , Intravenous, Continuous, Lisa Greene M.D., Last Rate: 30 mL/hr at 06/09/25 0000, New Bag at 06/09/25 0000

## 2025-06-09 NOTE — PROGRESS NOTES
Bedside report received and patient care assumed. Pt is resting in bed, A&O 2, with complaints of leg pain, and is on room air. Tele box on. All fall precautions are in place, belongings at bedside table.  Pt was updated on POC, no questions or concerns. Pt educated on use of call light for assistance.

## 2025-06-10 LAB
ANION GAP SERPL CALC-SCNC: 13 MMOL/L (ref 7–16)
BACTERIA UR CULT: ABNORMAL
BACTERIA UR CULT: ABNORMAL
BUN SERPL-MCNC: 30 MG/DL (ref 8–22)
CALCIUM SERPL-MCNC: 8.2 MG/DL (ref 8.5–10.5)
CHLORIDE SERPL-SCNC: 102 MMOL/L (ref 96–112)
CO2 SERPL-SCNC: 19 MMOL/L (ref 20–33)
CREAT SERPL-MCNC: 1.51 MG/DL (ref 0.5–1.4)
ERYTHROCYTE [DISTWIDTH] IN BLOOD BY AUTOMATED COUNT: 51.9 FL (ref 35.9–50)
EST. AVERAGE GLUCOSE BLD GHB EST-MCNC: 146 MG/DL
GFR SERPLBLD CREATININE-BSD FMLA CKD-EPI: 48 ML/MIN/1.73 M 2
GLUCOSE BLD STRIP.AUTO-MCNC: 143 MG/DL (ref 65–99)
GLUCOSE BLD STRIP.AUTO-MCNC: 147 MG/DL (ref 65–99)
GLUCOSE BLD STRIP.AUTO-MCNC: 153 MG/DL (ref 65–99)
GLUCOSE BLD STRIP.AUTO-MCNC: 166 MG/DL (ref 65–99)
GLUCOSE SERPL-MCNC: 147 MG/DL (ref 65–99)
HBA1C MFR BLD: 6.7 % (ref 4–5.6)
HCT VFR BLD AUTO: 26.3 % (ref 42–52)
HGB BLD-MCNC: 8.2 G/DL (ref 14–18)
MCH RBC QN AUTO: 26.1 PG (ref 27–33)
MCHC RBC AUTO-ENTMCNC: 31.2 G/DL (ref 32.3–36.5)
MCV RBC AUTO: 83.8 FL (ref 81.4–97.8)
PLATELET # BLD AUTO: 401 K/UL (ref 164–446)
PMV BLD AUTO: 10.1 FL (ref 9–12.9)
POTASSIUM SERPL-SCNC: 3.4 MMOL/L (ref 3.6–5.5)
RBC # BLD AUTO: 3.14 M/UL (ref 4.7–6.1)
SIGNIFICANT IND 70042: ABNORMAL
SITE SITE: ABNORMAL
SODIUM SERPL-SCNC: 134 MMOL/L (ref 135–145)
SOURCE SOURCE: ABNORMAL
WBC # BLD AUTO: 12.9 K/UL (ref 4.8–10.8)

## 2025-06-10 PROCEDURE — 700102 HCHG RX REV CODE 250 W/ 637 OVERRIDE(OP): Performed by: INTERNAL MEDICINE

## 2025-06-10 PROCEDURE — A9270 NON-COVERED ITEM OR SERVICE: HCPCS | Performed by: INTERNAL MEDICINE

## 2025-06-10 PROCEDURE — A9270 NON-COVERED ITEM OR SERVICE: HCPCS | Performed by: STUDENT IN AN ORGANIZED HEALTH CARE EDUCATION/TRAINING PROGRAM

## 2025-06-10 PROCEDURE — 36415 COLL VENOUS BLD VENIPUNCTURE: CPT

## 2025-06-10 PROCEDURE — 700105 HCHG RX REV CODE 258: Performed by: STUDENT IN AN ORGANIZED HEALTH CARE EDUCATION/TRAINING PROGRAM

## 2025-06-10 PROCEDURE — 700111 HCHG RX REV CODE 636 W/ 250 OVERRIDE (IP): Mod: JZ | Performed by: HOSPITALIST

## 2025-06-10 PROCEDURE — 82962 GLUCOSE BLOOD TEST: CPT | Performed by: HOSPITALIST

## 2025-06-10 PROCEDURE — A9270 NON-COVERED ITEM OR SERVICE: HCPCS | Performed by: HOSPITALIST

## 2025-06-10 PROCEDURE — 99233 SBSQ HOSP IP/OBS HIGH 50: CPT | Performed by: HOSPITALIST

## 2025-06-10 PROCEDURE — 770001 HCHG ROOM/CARE - MED/SURG/GYN PRIV*

## 2025-06-10 PROCEDURE — 85027 COMPLETE CBC AUTOMATED: CPT

## 2025-06-10 PROCEDURE — 99223 1ST HOSP IP/OBS HIGH 75: CPT | Mod: GC | Performed by: FAMILY MEDICINE

## 2025-06-10 PROCEDURE — 700102 HCHG RX REV CODE 250 W/ 637 OVERRIDE(OP): Performed by: STUDENT IN AN ORGANIZED HEALTH CARE EDUCATION/TRAINING PROGRAM

## 2025-06-10 PROCEDURE — 82962 GLUCOSE BLOOD TEST: CPT

## 2025-06-10 PROCEDURE — 700102 HCHG RX REV CODE 250 W/ 637 OVERRIDE(OP): Performed by: HOSPITALIST

## 2025-06-10 PROCEDURE — 80048 BASIC METABOLIC PNL TOTAL CA: CPT

## 2025-06-10 PROCEDURE — 83036 HEMOGLOBIN GLYCOSYLATED A1C: CPT

## 2025-06-10 PROCEDURE — 700111 HCHG RX REV CODE 636 W/ 250 OVERRIDE (IP): Mod: JZ | Performed by: STUDENT IN AN ORGANIZED HEALTH CARE EDUCATION/TRAINING PROGRAM

## 2025-06-10 RX ORDER — OXYCODONE HYDROCHLORIDE 5 MG/1
5-10 TABLET ORAL EVERY 4 HOURS PRN
Refills: 0 | Status: DISCONTINUED | OUTPATIENT
Start: 2025-06-10 | End: 2025-06-12

## 2025-06-10 RX ORDER — ATROPINE SULFATE 10 MG/ML
2 SOLUTION/ DROPS OPHTHALMIC EVERY 4 HOURS PRN
Status: DISCONTINUED | OUTPATIENT
Start: 2025-06-10 | End: 2025-06-12 | Stop reason: HOSPADM

## 2025-06-10 RX ORDER — POTASSIUM CHLORIDE 1500 MG/1
40 TABLET, EXTENDED RELEASE ORAL ONCE
Status: COMPLETED | OUTPATIENT
Start: 2025-06-10 | End: 2025-06-10

## 2025-06-10 RX ORDER — LORAZEPAM 2 MG/ML
1 INJECTION INTRAMUSCULAR
Status: DISCONTINUED | OUTPATIENT
Start: 2025-06-10 | End: 2025-06-12 | Stop reason: HOSPADM

## 2025-06-10 RX ORDER — PREGABALIN 25 MG/1
50 CAPSULE ORAL 3 TIMES DAILY
Status: DISCONTINUED | OUTPATIENT
Start: 2025-06-10 | End: 2025-06-12 | Stop reason: HOSPADM

## 2025-06-10 RX ORDER — SCOPOLAMINE 1 MG/3D
1 PATCH, EXTENDED RELEASE TRANSDERMAL
Status: DISCONTINUED | OUTPATIENT
Start: 2025-06-10 | End: 2025-06-12 | Stop reason: HOSPADM

## 2025-06-10 RX ORDER — LORAZEPAM 2 MG/ML
1 CONCENTRATE ORAL EVERY 6 HOURS PRN
Status: DISCONTINUED | OUTPATIENT
Start: 2025-06-10 | End: 2025-06-12 | Stop reason: HOSPADM

## 2025-06-10 RX ORDER — MAGNESIUM SULFATE HEPTAHYDRATE 40 MG/ML
2 INJECTION, SOLUTION INTRAVENOUS ONCE
Status: DISCONTINUED | OUTPATIENT
Start: 2025-06-10 | End: 2025-06-10

## 2025-06-10 RX ORDER — HYDROMORPHONE HYDROCHLORIDE 1 MG/ML
0.5 INJECTION, SOLUTION INTRAMUSCULAR; INTRAVENOUS; SUBCUTANEOUS EVERY 4 HOURS PRN
Status: DISCONTINUED | OUTPATIENT
Start: 2025-06-10 | End: 2025-06-10

## 2025-06-10 RX ORDER — MORPHINE SULFATE 100 MG/5ML
5 SOLUTION ORAL EVERY 4 HOURS PRN
Refills: 0 | Status: DISCONTINUED | OUTPATIENT
Start: 2025-06-10 | End: 2025-06-12

## 2025-06-10 RX ORDER — METHOCARBAMOL 500 MG/1
500 TABLET, FILM COATED ORAL 3 TIMES DAILY PRN
Status: DISCONTINUED | OUTPATIENT
Start: 2025-06-10 | End: 2025-06-12 | Stop reason: HOSPADM

## 2025-06-10 RX ADMIN — POTASSIUM CHLORIDE 40 MEQ: 1500 TABLET, EXTENDED RELEASE ORAL at 13:41

## 2025-06-10 RX ADMIN — MAGNESIUM SULFATE HEPTAHYDRATE 2 G: 2 INJECTION, SOLUTION INTRAVENOUS at 13:51

## 2025-06-10 RX ADMIN — OXYCODONE 10 MG: 5 TABLET ORAL at 14:30

## 2025-06-10 RX ADMIN — ATORVASTATIN CALCIUM 20 MG: 20 TABLET, FILM COATED ORAL at 17:41

## 2025-06-10 RX ADMIN — PREGABALIN 50 MG: 25 CAPSULE ORAL at 10:48

## 2025-06-10 RX ADMIN — METHOCARBAMOL 500 MG: 500 TABLET ORAL at 13:41

## 2025-06-10 RX ADMIN — PIPERACILLIN AND TAZOBACTAM 3.38 G: 3; .375 INJECTION, POWDER, FOR SOLUTION INTRAVENOUS at 14:30

## 2025-06-10 RX ADMIN — OXYCODONE 10 MG: 5 TABLET ORAL at 10:48

## 2025-06-10 RX ADMIN — APIXABAN 5 MG: 5 TABLET, FILM COATED ORAL at 04:57

## 2025-06-10 RX ADMIN — METOPROLOL TARTRATE 25 MG: 25 TABLET, FILM COATED ORAL at 17:41

## 2025-06-10 RX ADMIN — SCOPOLAMINE 1 PATCH: 1.5 PATCH, EXTENDED RELEASE TRANSDERMAL at 17:41

## 2025-06-10 RX ADMIN — PREGABALIN 50 MG: 25 CAPSULE ORAL at 17:40

## 2025-06-10 RX ADMIN — TAMSULOSIN HYDROCHLORIDE 0.4 MG: 0.4 CAPSULE ORAL at 04:57

## 2025-06-10 RX ADMIN — LINEZOLID 600 MG: 600 TABLET, FILM COATED ORAL at 04:57

## 2025-06-10 RX ADMIN — HYDROMORPHONE HYDROCHLORIDE 0.5 MG: 1 INJECTION, SOLUTION INTRAMUSCULAR; INTRAVENOUS; SUBCUTANEOUS at 14:58

## 2025-06-10 RX ADMIN — SODIUM BICARBONATE 650 MG: 650 TABLET ORAL at 08:12

## 2025-06-10 RX ADMIN — INSULIN LISPRO 1 UNITS: 100 INJECTION, SOLUTION INTRAVENOUS; SUBCUTANEOUS at 21:16

## 2025-06-10 RX ADMIN — METOPROLOL TARTRATE 25 MG: 25 TABLET, FILM COATED ORAL at 04:57

## 2025-06-10 RX ADMIN — PIPERACILLIN AND TAZOBACTAM 3.38 G: 3; .375 INJECTION, POWDER, FOR SOLUTION INTRAVENOUS at 05:01

## 2025-06-10 ASSESSMENT — ENCOUNTER SYMPTOMS
NAUSEA: 0
WEAKNESS: 1
VOMITING: 0
FEVER: 0
SHORTNESS OF BREATH: 0
MEMORY LOSS: 1
CHILLS: 1

## 2025-06-10 ASSESSMENT — FIBROSIS 4 INDEX: FIB4 SCORE: 1.23

## 2025-06-10 ASSESSMENT — PAIN DESCRIPTION - PAIN TYPE
TYPE: ACUTE PAIN
TYPE: CHRONIC PAIN;NEUROPATHIC PAIN
TYPE: ACUTE PAIN;CHRONIC PAIN

## 2025-06-10 NOTE — CARE PLAN
The patient is Stable - Low risk of patient condition declining or worsening    Shift Goals  Clinical Goals: VSS, pain control  Patient Goals: comfort  Family Goals: DIANNA    Progress made toward(s) clinical / shift goals:    Problem: Pain - Standard  Goal: Alleviation of pain or a reduction in pain to the patient’s comfort goal  Outcome: Progressing     Problem: Fluid Volume  Goal: Fluid volume balance will be maintained  Outcome: Progressing     Problem: Urinary - Renal Perfusion  Goal: Ability to achieve and maintain adequate renal perfusion and functioning will improve  Outcome: Progressing     Problem: Skin Integrity  Goal: Skin integrity is maintained or improved  Outcome: Progressing     Problem: Fall Risk  Goal: Patient will remain free from falls  Outcome: Progressing       Patient is not progressing towards the following goals:

## 2025-06-10 NOTE — DISCHARGE PLANNING
Care Transition Team Assessment    Patient is a 74 year-old male admitted for lactic acidosis. Please see patient's H&P for prior medical history. LMSW met with patient at bedside to complete assessment. Patient is A&Ox1 and able to verify the information on the face sheet. Monserrat Gastelum (p) [phone]; emergency contact. Advance Directive on file. Patient receives monthly SSI deposits. Patient's preferred pharmacy is [facility]. Patient denies any SA or MH concerns. Patient's confirmed medical coverage via Medicare and [facility]. LMSW will arrange transport back to [facility] when cleared to discharge.     Patient was not very open to answering questions when LMSW went in for an assessment. Patient stated that he was not open to SNF, however patient has long-term care at [facility]. When reviewing the notes, patient is bed bound.    Updated in IDT rounds, is on IV abx, wound care. Palliative consult was ordered. LMSW will contact [facility] to check and see if they are able to provide comfort care or hospice for him if he is on long-term care.    Addendum @1539: patient is going to be transferred to comfort care and will receive hospice at [facility]. LMSW was unable to obtain choice for hospice from the patient this afternoon but will in the morning. MD is aware. Patient should discharge tomorrow.    Information Source  Orientation Level: Oriented to person, Disoriented to place, Disoriented to time  Information Given By: Patient  Who is responsible for making decisions for patient? : Patient    Readmission Evaluation  Is this a readmission?: No    Elopement Risk  Legal Hold: No  Ambulatory or Self Mobile in Wheelchair: No-Not an Elopement Risk  Elopement Risk: Not at Risk for Elopement    Interdisciplinary Discharge Planning  Patient or legal guardian wants to designate a caregiver: No    Discharge Preparedness  What is your plan after discharge?: Home with help  What are your discharge  supports?: Other (comment) (patient reported that his friend Monserrat would be moderate support for him)  Prior Functional Level: Ambulatory  Difficulity with ADLs: None  Difficulity with IADLs: None    Functional Assesment  Prior Functional Level: Ambulatory    Finances  Financial Barriers to Discharge: No  Prescription Coverage: Yes    Vision / Hearing Impairment  Right Eye Vision: Impaired, Wears Glasses  Left Eye Vision: Impaired, Wears Glasses         Advance Directive  Advance Directive?: None  Advance Directive offered?: AD Booklet refused    Domestic Abuse  Have you ever been the victim of abuse or violence?: No    Psychological Assessment  History of Substance Abuse: None  History of Psychiatric Problems: No  Non-compliant with Treatment: No  Newly Diagnosed Illness: No    Discharge Risks or Barriers  Patient risk factors: Complex medical needs    Anticipated Discharge Information  Discharge Disposition: Discharged to home/self care (01)

## 2025-06-10 NOTE — DISCHARGE SUMMARY
Discharge Summary    CHIEF COMPLAINT ON ADMISSION  Chief Complaint   Patient presents with    Tachycardia     PT arrives from [facility] with a HR =180.  Appears irregular in rhythm.    Pain     Pt reports generalized pain since yesterday.       Reason for Admission  ems     Admission Date  6/8/2025    CODE STATUS  Comfort Care/DNR    HPI & HOSPITAL COURSE  This is a 74 y.o. male with past medical history of type 2 diabetes mellitus with glyco of 9.0, chronic kidney disease atrial fibrillation on apixaban who presented to the hospital on 6/8/2025 from [facility] after he found to have heart rate in 180s.      As per report patient was started on antibiotic vancomycin and Zosyn for his lower extremities cellulitis and he received PICC line at the [facility]. In ER, Patient found to have atrial fibrillation with RVR and he underwent synchronized cardioversion 200 J after sedation.  Patient remains in A-fib post cardioversion but rate improved.      Patient stated that he does have chronic wound with infection, wound care evaluated, appreciated  recommendation, blood culture x 2 no growth to date, previous culture growing MRSA, E. coli, Klebsiella.  ID has been consulted, currently patient is on IV Zosyn and Zyvox.  ID continue to follow during the stay in the hospital.    Later in the day, Palliative consulted and after an extensive discussion with the patient in regards to the CODE STATUS and  goals of care discussion, patient opted for comfort measures and hospice.    Hospice referral is place and patient patient will be discharged to skilled nursing facility on hospice/comfort measures. Hospice agency will follow the patient as soon as he is in skilled nursing facility.      6/12: Patient is new to me today, patient has transitioned to comfort care yesterday, patient in bed today, he is comfortable, does not appear in pain or in distress, very soft talking.   Continue comfort care protocol.     has arranged facility and hospice has accepted patient.     Therefore, he is discharged in fair and stable condition to hospice.    The patient met 2-midnight criteria for an inpatient stay at the time of discharge.    Discharge Date  6/12/2025      FOLLOW UP ITEMS POST DISCHARGE  Hospice team will follow up    DISCHARGE DIAGNOSES  Principal Problem:    Lactic acidosis (POA: Yes)  Active Problems:    Rectal cancer (HCC) (POA: Yes)      Overview: IMO load March 2020    Severe sepsis (HCC) (POA: Yes)    Type 2 diabetes mellitus with diabetic polyneuropathy, without long-term current use of insulin (HCC) (POA: Yes)    Atrial fibrillation with rapid ventricular response (HCC) (POA: Unknown)    Open wound of both lower extremities (POA: Unknown)    Elevated troponin (POA: Unknown)    Advanced care planning/counseling discussion (POA: Unknown)  Resolved Problems:    * No resolved hospital problems. *      FOLLOW UP  No future appointments.  No follow-up provider specified.    MEDICATIONS ON DISCHARGE     Medication List        CONTINUE taking these medications        Instructions   acetaminophen 500 MG Tabs  Commonly known as: Tylenol   Take 1,000 mg by mouth every 8 hours as needed for Mild Pain. 2 tablets = 1,000 mg  Dose: 1,000 mg     multivitamin Tabs   Take 1 Tablet by mouth every day.  Dose: 1 Tablet     tamsulosin 0.4 MG capsule  Commonly known as: Flomax   Take 0.4 mg by mouth every day. Take 30 minutes after the same meal each day.  Dose: 0.4 mg     Zituvio 100 MG Tabs  Generic drug: SITagliptin   Take 100 mg by mouth every day.  Dose: 100 mg            STOP taking these medications      atorvastatin 20 MG Tabs  Commonly known as: Lipitor     baclofen 5 MG Tabs  Commonly known as: Lioresal     cyanocobalamin 500 MCG Tabs  Commonly known as: Vitamin B-12     Eliquis 5mg Tabs  Generic drug: apixaban     HYDROmorphone 2 MG Tabs  Commonly known as: Dilaudid     metformin 1000 MG tablet  Commonly known  "as: Glucophage     oxyCODONE immediate-release 5 MG Tabs  Commonly known as: Roxicodone     piperacillin-tazobactam 4.5 (4-0.5) g Solr  Commonly known as: Zosyn     PROSTAT PO     sodium bicarbonate 650 MG Tabs  Commonly known as: Sodium Bicarbonate     thiamine 100 MG tablet  Commonly known as: Thiamine     Vancomycin HCl 1250 MG/250ML Soln              Allergies  Allergies[1]    DIET  Orders Placed This Encounter   Procedures    Diet Order Diet: Cardiac     Standing Status:   Standing     Number of Occurrences:   1     Diet::   Cardiac [6]       ACTIVITY  As tolerated.  Weight bearing as tolerated    CONSULTATIONS  ID  Wound care    PROCEDURES  Noen     LABORATORY  Lab Results   Component Value Date    SODIUM 134 (L) 06/10/2025    POTASSIUM 3.4 (L) 06/10/2025    CHLORIDE 102 06/10/2025    CO2 19 (L) 06/10/2025    GLUCOSE 147 (H) 06/10/2025    BUN 30 (H) 06/10/2025    CREATININE 1.51 (H) 06/10/2025        Lab Results   Component Value Date    WBC 12.9 (H) 06/10/2025    HEMOGLOBIN 8.2 (L) 06/10/2025    HEMATOCRIT 26.3 (L) 06/10/2025    PLATELETCT 401 06/10/2025        Total time of the discharge process exceeds 32 minutes.         [1]   Allergies  Allergen Reactions    Gabapentin Unspecified     \"Dizziness\"  On MAR from pharmacy      "

## 2025-06-10 NOTE — THERAPY
Occupational Therapy Contact Note    Patient Name: Sebastian iSnha  Age:  74 y.o., Sex:  male  Medical Record #: [id number]  Today's Date: 6/10/2025         06/10/25 0832   Interdisciplinary Plan of Care Collaboration   Collaboration Comments OT referral received. Per chart notes and PT, pt is bed-bound and requires assist for all BADL. He resides at SNF. OT eval deferred due to no new needs.

## 2025-06-10 NOTE — CARE PLAN
The patient is Stable - Low risk of patient condition declining or worsening    Shift Goals  Clinical Goals: VSS, Education, Cardiac monitoring  Patient Goals: Rest  Family Goals: DIANNA    Progress made toward(s) clinical / shift goals:      Problem: Pain - Standard  Goal: Alleviation of pain or a reduction in pain to the patient’s comfort goal  Outcome: Progressing     Problem: Hemodynamics  Goal: Patient's hemodynamics, fluid balance and neurologic status will be stable or improve  Outcome: Progressing     Problem: Fluid Volume  Goal: Fluid volume balance will be maintained  Outcome: Progressing     Problem: Urinary - Renal Perfusion  Goal: Ability to achieve and maintain adequate renal perfusion and functioning will improve  Outcome: Progressing     Problem: Respiratory  Goal: Patient will achieve/maintain optimum respiratory ventilation and gas exchange  Outcome: Progressing     Problem: Physical Regulation  Goal: Diagnostic test results will improve  Outcome: Progressing  Goal: Signs and symptoms of infection will decrease  Outcome: Progressing     Problem: Skin Integrity  Goal: Skin integrity is maintained or improved  Outcome: Progressing     Problem: Fall Risk  Goal: Patient will remain free from falls  Outcome: Progressing       Patient is not progressing towards the following goals:      Problem: Knowledge Deficit - Standard  Goal: Patient and family/care givers will demonstrate understanding of plan of care, disease process/condition, diagnostic tests and medications  Outcome: Not Progressing

## 2025-06-10 NOTE — DISCHARGE PLANNING
0846  Agency/Facility Name: [facility]  Spoke To: Susan  Outcome: DPA called to ask if pt is LTC at [facility], per Jade pt is a LTC and can be accepted back. NIKKI Velasquez notified.    0679  Agency/Facility Name: [facility]  Spoke To: Cat  Outcome: DPA called to ask if pt can be accepted back on hospice/comfort care? Cat verified pt can be accepted on Hospice today and requested that the DPOA gives hospice choice for an agency and transport to be arranged at [phone]. Cat asked if once the accepting agency accepts will they sign paper work here signing documents here, or at [facility]? NIKKI Velasquez notified.     151  Agency/Facility Name: [facility]  Spoke To: Cat  Outcome: DPA called to ask if they an updated contact phone number for DPOA. Cat provided phone # [phone]. NIKKI Velasquez notified.     4571  Agency/Facility Name: [facility]   Spoke To: Susan  Outcome: DPA called to notify that LSW has yet to get a hospice agency choice with pt and MD will discharge pt tomorrow morning.

## 2025-06-10 NOTE — PROGRESS NOTES
Bedside reported received from night shift. Pt on tele running A-Fib in the 90s. Pt reports during hand off that he is very sensitive with turns and will oftem refuse at it hurts to much. He states we can make a plan as the day goes on ho we can turn him but he still may refuse.  Call light within reach and all needs met.      1000 Spoke with pt about pain management asking if his pain was under better control would he be willing to reposition more. Talked a little about goals of life and his goal is to just be comfortable. He reports no family and a few friends that he doesn't want to bother. He feels like he doesn't have much time left. Spoke with MD about pain control and palliative.

## 2025-06-10 NOTE — WOUND TEAM
"Wound consult received regarding pts left elbow. Per consult \"please see l elbow pressure wound (blanching redness).\" Per consult the elbow is blanching. Chart and images reviewed. No pressure injury noted. Pts legs, heels and sacrum were evaluated on 6/8/25 and pt is on weekly follow up with wound team. Wound consult completed.   "

## 2025-06-10 NOTE — CONSULTS
MRN: [id number]  Date of palliative consult: 6/10  Reason for consult: Goals of care   Referring provider: Dr Lai Hospitalist   Location of consult: T707-2  Additional consulting services: ID    HPI:   Sebastian Sinha is a 74 y.o. male with medical history significant for DM2, CKD, and atrial fibrillation admitted on 6/8 from [facility] after he was found to be in afib with RVR. He was found to be in severe sepsis from chronic lower extremity wounds resulting in acute cellulitis despite being on IV vancomycin and zosyn from previous infections. He required cardioversion in the ED for afib RVR. We were consulted to discuss goals of care.   Upon arrival to room patient is sitting up in bed awake and alert.  I introduced myself and the role of palliative care and he was agreeable to conversation.  Patient has good understanding of his medical problems.  He can tell me in detail about the history of his lower extremity wounds, repeat infections, and his antibiotic regimen.  Patient tells me upfront that he is tired of coming to the hospital and does not feel that the treatments are benefiting him. He does not want any further treatment or interventions. He states that he is constantly in pain and does not believe he has any quality of life. Patient is very straight forward and says he has been thinking about this decision for several weeks. His primary goal is to be comfortable and spend what ever time he has remaining in peace out of the hospital.  We discussed what a transition to a comfort measures only care plan would entail.  He feels this most aligns with his goals and would like to stop antibiotics.  I discussed that this would likely shorten his life.  He expresses understanding of this and agrees with the hospice ideology of making today as good a day as it can and not prolonging this uncomfortable period of his life.       Additional Pertinent Medical History: as above    ROS:    ROS  +pain per hpi, otherwise  negative     PE:   Recent vital signs  BMI: Body mass index is 29.39 kg/m².    Temp (24hrs), Av.3 °C (97.3 °F), Min:36.1 °C (97 °F), Max:36.4 °C (97.5 °F)  Temperature: 36.1 °C (97 °F)  Pulse  Av  Min: 68  Max: 192   Blood Pressure : 98/57 (RN notified)       Physical Exam  General: Awake, alert, and easily conversational   Resp: Nml work breathing   Psych: AAOx3 with good understanding of his medical conditions and insight into what different treatment plans entail. Appears to have decision making capacity.     ASSESSMENT/PLAN WITH SHARED DECISION MAKING:   PHYSICAL ASPECTS OF CARE  Palliative Performance Scale: 30-40%      GOALS OF CARE/SERIOUS ILLNESS CONVERSATION  Introduced myself to Mr Blackman. Discussed role of palliative care and reason for consult. Patient agreeable to discuss goals of care. Per HPI, patient would like to transition to a comfort measures only care plan. Provided palliative care contact information and encouraged patient and primary team to reach out with any questions/needs.     Recommendations:  -Transition to comfort measures only care plan  -Recommend discontinuing antibiotics as this no long aligns with patients goals of care   -Would consider continuing patient's Metoprolol for comfort given history of afib RVR to prevent uncomfortable palpitations     Prognosis:   Patient is a 74-year-old male with multiple comorbidities including uncontrolled DM2, CKD, and atrial fibrillation with history of chronic infection to the lower extremities on IV antibiotics through PICC line prior to admission. He has an acute on chronic infection despite antibiotic use resulting in severe sepsis and lactic acidosis.  Patient has a PPS score of 30 to 40% and very poor functional status.  He wishes to transition to a comfort measures only care plan and stop antibiotics.  Given this and patient's uncontrolled infection I would suspect a prognosis of 6 months or less and he would be appropriate for  hospice referral.    Code Status: Comfort measures only, DNR/DNI    ACP Documents: on file, friend Monserrat TRUONG    Updated: primary team     45 minutes spent discussing advance care planning, this time excludes any other billed services.    Interval diagnostic studies and medical documentation entries pertinent to this case were reviewed independently by me. This patient has at least one acute or chronic illness or injury that poses a threat to life or bodily function. This patient suffers from a high risk of morbidity from additional invasive diagnostic testing or intensive treatment. Discussion of recommendations and coordination of care undertaken with primary provider/treatment team.      Mery Brian MD  HPM Fellow PGY4

## 2025-06-10 NOTE — PROGRESS NOTES
Hospital Medicine Daily Progress Note    Date of Service  6/9/2025    Chief Complaint  Sebastian Sinha is a 74 y.o. male admitted 6/8/2025 with tachycardia    Hospital Course  This is a 74 y.o. male with past medical history of type 2 diabetes mellitus with glyco of 9.0, chronic kidney disease atrial fibrillation on apixaban who presented to the hospital on 6/8/2025 from [facility] after he found to have heart rate in 180s.      As per report patient was started on antibiotic vancomycin and Zosyn for his lower extremities cellulitis and he received PICC line at the [facility].      ER course: Patient found to have atrial fibrillation with RVR and he underwent synchronized cardioversion 200 J after sedation.  Patient remains in A-fib post cardioversion but rate improved.    Patient stated that he does have chronic wound with infection, wound care evaluated, procedure recommendation, blood culture x 2 no growth to date, previous culture growing MRSA, E. coli, Klebsiella.  ID has been consulted, currently patient is on IV Zosyn and Zyvox.    Interval events:  -- Patient is alert, awake, answering questions appropriately, vital sign has been reviewed, patient noted to remain hypotensive,, will monitor, continue IV antibiotics including Zyvox and Zosyn.  Patient remains on Zyvox and Zosyn, discharge, CMP on a daily basis.  Patient noted to be in atrial fibrillation, rate is well-controlled after initial cardioversion, currently on metoprolol 25 mg p.o. twice daily along with Eliquis 5 mg p.o. twice daily.  --Labs reviewed, potassium at 3.4, creatinine at 1.5  --WBC elevated at 12.9, hemoglobin 8.2, monitor    Artery ultrasound was obtained, noted to have normal bilateral lower extremity arterial ultrasound- wound care       3:13 PM  During the day, palliative has evaluated, had an extensive discussion of goals of care, patient will comfort measures/hospice.  Hospice referral has been placed.  Case management  updated.  Appreciate palliative input  I have discussed this patient's plan of care and discharge plan at IDT rounds today with Case Management, Nursing, Nursing leadership, and other members of the IDT team.    Consultants/Specialty  infectious disease    Code Status  DNAR/DNI    Disposition  The patient is not medically cleared for discharge to home or a post-acute facility.  Anticipate discharge to: skilled nursing facility    I have placed the appropriate orders for post-discharge needs.    Review of Systems  Review of Systems   Constitutional:  Positive for chills and malaise/fatigue. Negative for fever.   Respiratory:  Negative for shortness of breath.    Cardiovascular:  Positive for leg swelling. Negative for chest pain.   Gastrointestinal:  Negative for nausea and vomiting.   Genitourinary:  Negative for dysuria.   Musculoskeletal:  Positive for joint pain.   Neurological:  Positive for weakness.   Psychiatric/Behavioral:  Positive for memory loss.         Physical Exam  Temp:  [36.3 °C (97.3 °F)-36.4 °C (97.5 °F)] 36.4 °C (97.5 °F)  Pulse:  [] 74  Resp:  [9-32] 16  BP: ()/(39-79) 91/39  SpO2:  [95 %-100 %] 98 %    Physical Exam  Vitals and nursing note reviewed.   Constitutional:       General: He is not in acute distress.     Appearance: He is ill-appearing.      Comments: Frail elderly male, chronically ill appearing    HENT:      Head: Normocephalic and atraumatic.      Mouth/Throat:      Pharynx: Oropharynx is clear.   Eyes:      Conjunctiva/sclera: Conjunctivae normal.   Cardiovascular:      Rate and Rhythm: Normal rate. Rhythm irregular.   Pulmonary:      Effort: Pulmonary effort is normal.   Abdominal:      General: Bowel sounds are normal.      Palpations: Abdomen is soft.   Musculoskeletal:         General: Swelling, tenderness and signs of injury present.      Right lower leg: Edema present.      Left lower leg: Edema present.   Skin:     General: Skin is warm.      Coloration: Skin  is pale.   Neurological:      Mental Status: He is alert. He is disoriented.   Psychiatric:         Mood and Affect: Mood normal.         Behavior: Behavior normal.           Fluids    Intake/Output Summary (Last 24 hours) at 6/9/2025 1349  Last data filed at 6/9/2025 1152  Gross per 24 hour   Intake 777.57 ml   Output --   Net 777.57 ml        Laboratory  Recent Labs     06/08/25  1000 06/09/25  0042   WBC 16.1* 14.1*   RBC 3.79* 3.14*   HEMOGLOBIN 9.9* 8.4*   HEMATOCRIT 32.1* 26.6*   MCV 84.7 84.7   MCH 26.1* 26.8*   MCHC 30.8* 31.6*   RDW 52.2* 51.8*   PLATELETCT 433 387   MPV 9.3 9.2     Recent Labs     06/08/25  1000 06/09/25  0042   SODIUM 136 137   POTASSIUM 3.3* 3.7   CHLORIDE 100 103   CO2 18* 21   GLUCOSE 173* 137*   BUN 22 26*   CREATININE 1.40 1.53*   CALCIUM 8.8 8.4*                   Imaging  DX-CHEST-PORTABLE (1 VIEW)   Final Result         1. No acute abnormalities evident.      2. Very severe left shoulder arthritis unchanged.                     US-EXTREMITY ARTERY LOWER BILAT W/TERRIE (COMBO)    (Results Pending)        Assessment/Plan  * Lactic acidosis- (present on admission)  Assessment & Plan  Patient found to have lactic acidosis most likely secondary infection.  I ordered sepsis order set up  Trend lactic acid level.      Advanced care planning/counseling discussion  Assessment & Plan  Pt with poor understanding of code status discussion on my evaluation. Given previous documentation of his CODE status being DNR/I we will revert to his previous wishes. Can continue to readdress and attempt to get a POLST completed    Elevated troponin  Assessment & Plan  Patient found to have atrial fibrillation with rapid events response that can lead to elevated troponin.  I ordered repeat troponin level.  Patient denied any chest pain    Open wound of both lower extremities  Assessment & Plan  Patient found to have chronic wounds on bilateral lower extremities.  Ordered wound culture and Gram  stain.  Requested wound care consultation.  Based on previous Cx, abx changed to zosyn and linezolid.   I have consulted and discussed case with ID appreciate further recommendations  Order ultrasound unremarkable    Patient need aggressive wound care      Atrial fibrillation with rapid ventricular response (HCC)  Assessment & Plan  Patient found to have atrial fibrillation with rapid ventricular response  Patient underwent electrocardioversion 200 J.  Patient remains atrial fibrillation.  Will optimize electrolytes, continue beta-blocker and Eliquis       Type 2 diabetes mellitus with diabetic polyneuropathy, without long-term current use of insulin (HCC)- (present on admission)  Assessment & Plan  Type II diaebtes   Continue ISS, hypoglycemic protocol  DM diet      Severe sepsis (HCC)- (present on admission)  Assessment & Plan  This is Sepsis Present on admission  SIRS criteria identified on my evaluation include: Tachycardia, with heart rate greater than 90 BPM, Tachypnea, with respirations greater than 20 per minute, and Leukocytosis, with WBC greater than 12,000  Clinical indicators of end organ dysfunction include Lactic Acid greater than 2  Source is cellulitis  Sepsis protocol initiated  Crystalloid Fluid Administration: Resuscitation volume of 1 L bolus ordered. Reason that resuscitation volume of less than 30ml/kg was ordered concern for fluid overload  IV antibiotics as appropriate for source of sepsis  Reassessment: I have reassessed the patient's hemodynamic status    Patient received dose of vancomycin in the ER and that is good for till tomorrow.  He also received a dose of Zosyn in the ER.  Resume zosyn and start linezolid secondary to previous Cx results, ID following     Metastatic lung cancer (metastasis from lung to other site), right (HCC)  Assessment & Plan  Outpatient follow-up.    Rectal cancer (HCC)- (present on admission)  Assessment & Plan  Outpatient follow-up  Ostomy         VTE  prophylaxis: eliqus        Patient is has a high medical complexity, complex decision making and is at high risk for complication, morbidity, and mortality.    Greater than 55 minutes spent prepping to see patient (e.g. review of tests) obtaining and/or reviewing separately obtained history. Performing a medically appropriate examination and/ evaluation.  Counseling and educating the patient/family/caregiver.  Ordering medications, tests, or procedures.  Referring and communicating with other health care professionals.  Documenting clinical information in EPIC.  Independently interpreting results and communicating results to patient/family/caregiver.  Care coordination.

## 2025-06-10 NOTE — PROGRESS NOTES
Bedside report received and patient care assumed. Pt is resting in bed, A&O 3, with no complaints of pain, and is on room air. Tele box on. All fall precautions are in place, belongings at bedside table.  Pt was updated on POC, no questions or concerns. Pt educated on use of call light for assistance.

## 2025-06-10 NOTE — PROGRESS NOTES
Monitor Summary  Rhythm: A-fib  Rate:   Ectopy: (F) PVC couplets  Measurements: N/A/.07/N/A    --NO IMAGE AVAILABLE--

## 2025-06-11 LAB
ALBUMIN SERPL BCP-MCNC: 2.4 G/DL (ref 3.2–4.9)
ANION GAP SERPL CALC-SCNC: 12 MMOL/L (ref 7–16)
BUN SERPL-MCNC: 28 MG/DL (ref 8–22)
CALCIUM ALBUM COR SERPL-MCNC: 9.8 MG/DL (ref 8.5–10.5)
CALCIUM SERPL-MCNC: 8.5 MG/DL (ref 8.5–10.5)
CHLORIDE SERPL-SCNC: 106 MMOL/L (ref 96–112)
CO2 SERPL-SCNC: 20 MMOL/L (ref 20–33)
CREAT SERPL-MCNC: 1.55 MG/DL (ref 0.5–1.4)
ERYTHROCYTE [DISTWIDTH] IN BLOOD BY AUTOMATED COUNT: 51.8 FL (ref 35.9–50)
GFR SERPLBLD CREATININE-BSD FMLA CKD-EPI: 47 ML/MIN/1.73 M 2
GLUCOSE BLD STRIP.AUTO-MCNC: 131 MG/DL (ref 65–99)
GLUCOSE SERPL-MCNC: 163 MG/DL (ref 65–99)
HCT VFR BLD AUTO: 24.3 % (ref 42–52)
HGB BLD-MCNC: 7.6 G/DL (ref 14–18)
MAGNESIUM SERPL-MCNC: 2.1 MG/DL (ref 1.5–2.5)
MCH RBC QN AUTO: 26.3 PG (ref 27–33)
MCHC RBC AUTO-ENTMCNC: 31.3 G/DL (ref 32.3–36.5)
MCV RBC AUTO: 84.1 FL (ref 81.4–97.8)
PHOSPHATE SERPL-MCNC: 2.6 MG/DL (ref 2.5–4.5)
PLATELET # BLD AUTO: 405 K/UL (ref 164–446)
PMV BLD AUTO: 9.2 FL (ref 9–12.9)
POTASSIUM SERPL-SCNC: 3.5 MMOL/L (ref 3.6–5.5)
RBC # BLD AUTO: 2.89 M/UL (ref 4.7–6.1)
SODIUM SERPL-SCNC: 138 MMOL/L (ref 135–145)
WBC # BLD AUTO: 10.8 K/UL (ref 4.8–10.8)

## 2025-06-11 PROCEDURE — A9270 NON-COVERED ITEM OR SERVICE: HCPCS | Performed by: INTERNAL MEDICINE

## 2025-06-11 PROCEDURE — 700102 HCHG RX REV CODE 250 W/ 637 OVERRIDE(OP): Performed by: HOSPITALIST

## 2025-06-11 PROCEDURE — A9270 NON-COVERED ITEM OR SERVICE: HCPCS | Performed by: HOSPITALIST

## 2025-06-11 PROCEDURE — 80069 RENAL FUNCTION PANEL: CPT

## 2025-06-11 PROCEDURE — 700102 HCHG RX REV CODE 250 W/ 637 OVERRIDE(OP): Performed by: INTERNAL MEDICINE

## 2025-06-11 PROCEDURE — 86480 TB TEST CELL IMMUN MEASURE: CPT

## 2025-06-11 PROCEDURE — 770001 HCHG ROOM/CARE - MED/SURG/GYN PRIV*

## 2025-06-11 PROCEDURE — 82962 GLUCOSE BLOOD TEST: CPT | Performed by: HOSPITALIST

## 2025-06-11 PROCEDURE — 85027 COMPLETE CBC AUTOMATED: CPT

## 2025-06-11 PROCEDURE — 99233 SBSQ HOSP IP/OBS HIGH 50: CPT | Performed by: HOSPITALIST

## 2025-06-11 PROCEDURE — 83735 ASSAY OF MAGNESIUM: CPT

## 2025-06-11 PROCEDURE — 36415 COLL VENOUS BLD VENIPUNCTURE: CPT

## 2025-06-11 RX ADMIN — PREGABALIN 50 MG: 25 CAPSULE ORAL at 17:20

## 2025-06-11 RX ADMIN — PREGABALIN 50 MG: 25 CAPSULE ORAL at 12:14

## 2025-06-11 RX ADMIN — METOPROLOL TARTRATE 25 MG: 25 TABLET, FILM COATED ORAL at 17:19

## 2025-06-11 RX ADMIN — PREGABALIN 50 MG: 25 CAPSULE ORAL at 05:14

## 2025-06-11 RX ADMIN — ATORVASTATIN CALCIUM 20 MG: 20 TABLET, FILM COATED ORAL at 17:20

## 2025-06-11 ASSESSMENT — PAIN DESCRIPTION - PAIN TYPE: TYPE: ACUTE PAIN

## 2025-06-11 NOTE — DOCUMENTATION QUERY
"                                                                         [facility]                                                                       Query Response Note      PATIENT:               CYRUS SAAB  ACCT #:                  [id number]  MRN:                     [id number]  :                      1950  ADMIT DATE:       2025 9:43 AM  DISCH DATE:          RESPONDING  PROVIDER #:        [id number]           QUERY TEXT:    Altered mental status is documented in the Medical Record. Can a more specific diagnosis be provided?    Note: If a query response diagnosis is provided, please include it in your daily notes & DC Summary    Shelia Banerjee  RN-CDIS  [address] [place] [address]  [id number]  [email or web address]  Connect via Voalte Messenger    The patient's Clinical Indicators include:  ED: Altered mental status / Confusion  HP: \" Plan is to discuss with him CODE STATUS once he is less lethargic.\"    HP: Open wound bilat lower exts    CONS : He is alert and oriented to person, place, and time.     RR 24 WBC 16.4  lactic acidosis 3.3    Risk Factors: Sepsis, Cellulitis, Atrial Fib with RVR    Treatment: unasyn, IV fluid, zosyn  Options provided:   -- Acute metabolic encephalopathy   -- Acute encephalopathy due to other medical condition, (please specify other medical condition)   -- Other explanation, (please specify other explanation)   -- Unable to determine      Query created by: Shelia Banerjee on 6/10/2025 9:17 AM    RESPONSE TEXT:    Acute metabolic encephalopathy          Electronically signed by:  ARVIN MCPHERSON 2025 4:44 PM              "

## 2025-06-11 NOTE — CARE PLAN
The patient is Stable - Low risk of patient condition declining or worsening    Shift Goals  Clinical Goals: comfort  Patient Goals: rest, comfort  Family Goals: DIANNA    Progress made toward(s) clinical / shift goals:      Problem: Knowledge Deficit - Standard  Goal: Patient and family/care givers will demonstrate understanding of plan of care, disease process/condition, diagnostic tests and medications  Outcome: Progressing     Problem: Skin Integrity  Goal: Skin integrity is maintained or improved  Outcome: Progressing       Patient is not progressing towards the following goals:

## 2025-06-11 NOTE — PROGRESS NOTES
Report received from day shift RN, assumed care of pt. Pt A&Ox 3. Plan of care discussed with pt, labs and chart reviewed. All needs met at this time. Tele box on. On room air. Call light within reach, bed locked and in lowest position. All fall precautions and hourly rounding in place.

## 2025-06-11 NOTE — CARE PLAN
The patient is Stable - Low risk of patient condition declining or worsening    Shift Goals  Clinical Goals: VSS, pain management, q2 turns  Patient Goals: comfort  Family Goals: DIANNA    Progress made toward(s) clinical / shift goals:    Problem: Pain - Standard  Goal: Alleviation of pain or a reduction in pain to the patient’s comfort goal  Outcome: Progressing     Problem: Knowledge Deficit - Standard  Goal: Patient and family/care givers will demonstrate understanding of plan of care, disease process/condition, diagnostic tests and medications  Outcome: Progressing     Problem: Skin Integrity  Goal: Skin integrity is maintained or improved  Outcome: Progressing     Problem: Fall Risk  Goal: Patient will remain free from falls  Outcome: Progressing       Patient is not progressing towards the following goals:

## 2025-06-11 NOTE — DISCHARGE PLANNING
0830  Received Choice form at 0806  Agency/Facility Name: [facility]  Referral sent per Choice form @ 0830    0835  Agency/Facility Name: [facility]  Outcome: Susan left  to ask if there has been a hospice choice? Susan requested call back.     0900  Agency/Facility Name: [facility]   Spoke To: Cat  Outcome: DPA called to notify that the pt's hospice preference is [facility] and have yet to make decision.     1140  Ag[facility]/Facility [facility]: [facility]   Outcome: Cat LVM to ask if pt will still be discharging to [facility] as she received a call from [facility] that the [facility] will [facility] for room and boarding to facility that is [facility] contracted.    1148  Agency/Facility Name: [facility]  Spoke To: Cat  Outcome: DPA called back to inform SNF referral to [facility] was sent.     1153  Agency/Facility Name: [facility]  Spoke To: Eliezer  Outcome: DPA called to notify that pt will be discharging on hospice with [facility]. Eliezer asked if the [facility] has given auth?    1155  Agency/Facility Name: [facility]  Spoke To: Jackie  Outcome: DPA called to ask about the VA Jackie see to contact the [facility] and will call Eliezer with updates and arrange transport.

## 2025-06-12 VITALS
OXYGEN SATURATION: 100 % | BODY MASS INDEX: 29.32 KG/M2 | HEART RATE: 81 BPM | DIASTOLIC BLOOD PRESSURE: 62 MMHG | SYSTOLIC BLOOD PRESSURE: 93 MMHG | WEIGHT: 204.81 LBS | HEIGHT: 70 IN | TEMPERATURE: 97.2 F | RESPIRATION RATE: 16 BRPM

## 2025-06-12 LAB
GAMMA INTERFERON BACKGROUND BLD IA-ACNC: 0.01 IU/ML
M TB IFN-G BLD-IMP: NEGATIVE
M TB IFN-G CD4+ BCKGRND COR BLD-ACNC: 0 IU/ML
MITOGEN IGNF BCKGRD COR BLD-ACNC: >10 IU/ML
QFT TB2 - NIL TBQ2: 0.01 IU/ML

## 2025-06-12 PROCEDURE — 302102 BAG OST N IMG 2.25IN 2PC (FECAL): Performed by: HOSPITALIST

## 2025-06-12 PROCEDURE — 99239 HOSP IP/OBS DSCHRG MGMT >30: CPT | Performed by: HOSPITALIST

## 2025-06-12 PROCEDURE — 700102 HCHG RX REV CODE 250 W/ 637 OVERRIDE(OP): Performed by: HOSPITALIST

## 2025-06-12 PROCEDURE — A9270 NON-COVERED ITEM OR SERVICE: HCPCS | Performed by: HOSPITALIST

## 2025-06-12 PROCEDURE — 302106 OSTOMY POWDER: Performed by: HOSPITALIST

## 2025-06-12 PROCEDURE — 700111 HCHG RX REV CODE 636 W/ 250 OVERRIDE (IP): Performed by: HOSPITALIST

## 2025-06-12 RX ORDER — MORPHINE SULFATE 100 MG/5ML
5-10 SOLUTION ORAL EVERY 4 HOURS PRN
Refills: 0 | Status: DISCONTINUED | OUTPATIENT
Start: 2025-06-12 | End: 2025-06-12 | Stop reason: HOSPADM

## 2025-06-12 RX ADMIN — MORPHINE SULFATE 10 MG: 100 SOLUTION ORAL at 13:49

## 2025-06-12 RX ADMIN — MORPHINE SULFATE 5 MG: 100 SOLUTION ORAL at 10:33

## 2025-06-12 RX ADMIN — MORPHINE SULFATE 5 MG: 100 SOLUTION ORAL at 02:12

## 2025-06-12 RX ADMIN — PREGABALIN 50 MG: 25 CAPSULE ORAL at 05:03

## 2025-06-12 RX ADMIN — LORAZEPAM 1 MG: 2 INJECTION INTRAMUSCULAR; INTRAVENOUS at 01:41

## 2025-06-12 SDOH — ECONOMIC STABILITY: TRANSPORTATION INSECURITY
IN THE PAST 12 MONTHS, HAS THE LACK OF TRANSPORTATION KEPT YOU FROM MEDICAL APPOINTMENTS OR FROM GETTING MEDICATIONS?: PATIENT DECLINED

## 2025-06-12 SDOH — ECONOMIC STABILITY: TRANSPORTATION INSECURITY
IN THE PAST 12 MONTHS, HAS LACK OF RELIABLE TRANSPORTATION KEPT YOU FROM MEDICAL APPOINTMENTS, MEETINGS, WORK OR FROM GETTING THINGS NEEDED FOR DAILY LIVING?: PATIENT DECLINED

## 2025-06-12 ASSESSMENT — ENCOUNTER SYMPTOMS
VOMITING: 0
SHORTNESS OF BREATH: 0
CHILLS: 1
NAUSEA: 0
WEAKNESS: 1
MEMORY LOSS: 1
FEVER: 0

## 2025-06-12 ASSESSMENT — PAIN DESCRIPTION - PAIN TYPE
TYPE: ACUTE PAIN;CHRONIC PAIN
TYPE: ACUTE PAIN;CHRONIC PAIN
TYPE: ACUTE PAIN
TYPE: ACUTE PAIN;CHRONIC PAIN
TYPE: ACUTE PAIN;CHRONIC PAIN

## 2025-06-12 ASSESSMENT — SOCIAL DETERMINANTS OF HEALTH (SDOH)
WITHIN THE LAST YEAR, HAVE TO BEEN RAPED OR FORCED TO HAVE ANY KIND OF SEXUAL ACTIVITY BY YOUR PARTNER OR EX-PARTNER?: PATIENT DECLINED
IN THE PAST 12 MONTHS, HAS THE ELECTRIC, GAS, OIL, OR WATER COMPANY THREATENED TO SHUT OFF SERVICE IN YOUR HOME?: PATIENT DECLINED
WITHIN THE LAST YEAR, HAVE YOU BEEN AFRAID OF YOUR PARTNER OR EX-PARTNER?: PATIENT DECLINED
WITHIN THE PAST 12 MONTHS, THE FOOD YOU BOUGHT JUST DIDN'T LAST AND YOU DIDN'T HAVE MONEY TO GET MORE: PATIENT DECLINED
WITHIN THE LAST YEAR, HAVE YOU BEEN HUMILIATED OR EMOTIONALLY ABUSED IN OTHER WAYS BY YOUR PARTNER OR EX-PARTNER?: PATIENT DECLINED
WITHIN THE LAST YEAR, HAVE YOU BEEN KICKED, HIT, SLAPPED, OR OTHERWISE PHYSICALLY HURT BY YOUR PARTNER OR EX-PARTNER?: PATIENT DECLINED
WITHIN THE PAST 12 MONTHS, YOU WORRIED THAT YOUR FOOD WOULD RUN OUT BEFORE YOU GOT THE MONEY TO BUY MORE: PATIENT DECLINED

## 2025-06-12 NOTE — CARE PLAN
The patient is Watcher - Medium risk of patient condition declining or worsening    Shift Goals  Clinical Goals: comfort, pain management  Patient Goals: sleep  Family Goals: DIANNA    Progress made toward(s) clinical / shift goals:    Problem: Pain - Standard  Goal: Alleviation of pain or a reduction in pain to the patient’s comfort goal  Outcome: Progressing     Problem: Knowledge Deficit - Standard  Goal: Patient and family/care givers will demonstrate understanding of plan of care, disease process/condition, diagnostic tests and medications  Outcome: Progressing       Patient is not progressing towards the following goals:

## 2025-06-12 NOTE — DISCHARGE PLANNING
Case Management Discharge Planning    Admission Date: 6/8/2025  GMLOS: 3.5  ALOS: 4    6-Clicks ADL Score: 16  6-Clicks Mobility Score: 6  PT and/or OT Eval ordered: NA  Post-acute Referrals Ordered: NA  Post-acute Choice Obtained: NA  Has referral(s) been sent to post-acute provider:  SALAS      Anticipated Discharge Dispo: Discharge Disposition: D/T to [facility] medical facility ()    DME Needed: No    Action(s) Taken: RNCM placed call to [facility] [place] [facility] who stated patient signed hospice consents yesterday and she can arrange transport to [facility] whenever patient is ready. RNCM placed call to Ariela at the [phone] who stated she needs a nursing note faxed to her regarding patient's ADL's. RNCM faxed note to Ariela [phone]. Per Ariela, she needs approval from her team that they will pay for room and board at [facility] then will give this RNCM a call back.   1151: Per MD, patient, patient may not have capacity. [phone]; no CECY momin requesting a call back.   1230: Per Dr. Diaz, if hospice was comfortable with hospice signing consent and capacity, there won't be a problem today.   1248: [facility] who stated [facility] are still [facility] if they will [facility] for the room and board at [facility].   1308: RNCM received call from [facility] at the [facility] who stated they will [facility] for [facility] and gave auth for [facility]. DPA placed call to [facility] at [facility] who stated they can take patient at 1430 today. RNCM placed call to [facility] [place] with [facility] who stated they will request transport for 1430 and will let this RNCM know when it is confirmed.   1407: Transport confirmed for 1530 via [facility].     Escalations Completed: None    Medically Clear: Yes    Next Steps: pending approval from the [facility]    Barriers to Discharge: Pending Placement    Is the patient up for discharge tomorrow: No - today

## 2025-06-12 NOTE — PROGRESS NOTES
4 Eyes Skin Assessment Completed by Kristal CLARKE RN and Anabela MORRISSEY RN.    Skin assessment is primarily focused on high risk bony prominences. Pay special attention to skin beneath and around medical devices, high risk bony prominences, skin to skin areas and areas where the patient lacks sensation to feel pain and areas where the patient previously had breakdown.     Head (Occipital):  WDL   Ears (Under Medical Devices): WDL   Nose (Under Medical Devices): WDL   Mouth:  WDL   Neck: WDL   Breast/Chest:  WDL   Shoulder Blades:  Dryness   Spine:   Dryness   (R) Arm/Elbow/Hand: Red, Blanching, and Bruising   (L) Arm/Elbow/Hand: Red, Blanching, and Bruising   Abdomen: WDL   Pannus/Groin:  Red and Blanching   Sacrum/Coccyx:   **Wound/discoloration of bony prominence (Suspected Pressure injury)**, Skin Tear, Red, Blanching, Bleeding, and Excoriation/scratched   (R) Ischial Tuberosity (Sit Bones):  Red and Blanching   (L) Ischial Tuberosity (Sit Bones):  Red and Blanching   (R) Leg:  Open wound   (L) Leg:  Open wound   (R) Heel:  Red and Blanching   (R) Foot/Toe: WDL   (L) Heel: Red and Blanching   (L) Foot/Toe:  WDL       DEVICES IN USE:   Respiratory Devices:  NA, patient on room air  Feeding Devices:  N/A   Lines & BP Monitoring Devices:  Peripheral IV    Orthopedic Devices:  N/A  Miscellaneous Devices:  Laya Schulte    PROTOCOL INTERVENTIONS:   Low Airloss Bed:  Already in place  Elbows Padded with Offloading Dressing:  Reinforced/reapplied  Offloading Dressing - Sacrum:  Changed  Heel Float Boots:  Already in place  Q2 Turns with Wedges:  Already in place  Glide Sheet:  Already in place  Barrier Paste:  Applied this assessment  Schulte:  Already in place    WOUND PHOTOS:   Completed and in EPIC     WOUND CONSULT:   Wound team already following area(s) of concern

## 2025-06-12 NOTE — PROGRESS NOTES
1333: Called LAKEISHA for report, pending call back    1537: Called LAKEISHA again for report, pending call back. Pt discharged.

## 2025-06-12 NOTE — PROGRESS NOTES
Report received from day shift RN, assumed care of pt. Pt A&Ox 2. Plan of care discussed with pt, labs and chart reviewed. All needs met at this time. Tele box on. On room air. Call light within reach, bed locked and in lowest position. All fall precautions and hourly rounding in place.

## 2025-06-12 NOTE — DISCHARGE PLANNING
4038  RACHEL spoke with Eliezer (Paty) to advise [facility] auth was received and what time can accept pt. Eliezer says [phone]. Advised RN RITU Fontana.

## 2025-06-12 NOTE — PROGRESS NOTES
Hospital Medicine Daily Progress Note    Date of Service:  6/11/2025  Chief Complaint  Sebastian Sinha is a 74 y.o. male admitted 6/8/2025 with tachycardia    Hospital Course  This is a 74 y.o. male with past medical history of type 2 diabetes mellitus with glyco of 9.0, chronic kidney disease atrial fibrillation on apixaban who presented to the hospital on 6/8/2025 from [facility] after he found to have heart rate in 180s.      As per report patient was started on antibiotic vancomycin and Zosyn for his lower extremities cellulitis and he received PICC line at the [facility].      ER course: Patient found to have atrial fibrillation with RVR and he underwent synchronized cardioversion 200 J after sedation.  Patient remains in A-fib post cardioversion but rate improved.    Patient stated that he does have chronic wound with infection, wound care evaluated, procedure recommendation, blood culture x 2 no growth to date, previous culture growing MRSA, E. coli, Klebsiella.  ID has been consulted, currently patient is on IV Zosyn and Zyvox.    Interval events:  -- Patient is alert, awake, answering questions appropriately, vital sign has been reviewed, patient noted to remain hypotensive,, will monitor, continue IV antibiotics including Zyvox and Zosyn.  Patient remains on Zyvox and Zosyn, discharge, CMP on a daily basis.  Patient noted to be in atrial fibrillation, rate is well-controlled after initial cardioversion, currently on metoprolol 25 mg p.o. twice daily along with Eliquis 5 mg p.o. twice daily.  --Labs reviewed, potassium at 3.4, creatinine at 1.5  --WBC elevated at 12.9, hemoglobin 8.2, monitor    Artery ultrasound was obtained, noted to have normal bilateral lower extremity arterial ultrasound- wound care       3:13 PM  During the day, palliative has evaluated, had an extensive discussion of goals of care, patient will comfort measures/hospice.  Hospice referral has been placed.  Case management  updated.  Appreciate palliative input  I have discussed this patient's plan of care and discharge plan at IDT rounds today with Case Management, Nursing, Nursing leadership, and other members of the IDT team.    Consultants/Specialty  infectious disease    Code Status  Comfort Care/DNR    Disposition  Medically Cleared  I have placed the appropriate orders for post-discharge needs.    Review of Systems  Review of Systems   Constitutional:  Positive for chills and malaise/fatigue. Negative for fever.   Respiratory:  Negative for shortness of breath.    Cardiovascular:  Positive for leg swelling. Negative for chest pain.   Gastrointestinal:  Negative for nausea and vomiting.   Genitourinary:  Negative for dysuria.   Musculoskeletal:  Positive for joint pain.   Neurological:  Positive for weakness.   Psychiatric/Behavioral:  Positive for memory loss.         Physical Exam  Temp:  [36.2 °C (97.2 °F)] 36.2 °C (97.2 °F)  Pulse:  [74-81] 81  Resp:  [18] 18  BP: ()/(62) 93/62  SpO2:  [100 %] 100 %    Physical Exam  Vitals and nursing note reviewed.   Constitutional:       General: He is not in acute distress.     Appearance: He is ill-appearing.      Comments: Frail elderly male, chronically ill appearing    HENT:      Head: Normocephalic and atraumatic.      Mouth/Throat:      Pharynx: Oropharynx is clear.   Eyes:      Conjunctiva/sclera: Conjunctivae normal.   Cardiovascular:      Rate and Rhythm: Normal rate. Rhythm irregular.   Pulmonary:      Effort: Pulmonary effort is normal.   Abdominal:      General: Bowel sounds are normal.      Palpations: Abdomen is soft.   Musculoskeletal:         General: Swelling, tenderness and signs of injury present.      Right lower leg: Edema present.      Left lower leg: Edema present.   Skin:     General: Skin is warm.      Coloration: Skin is pale.   Neurological:      Mental Status: He is alert. He is disoriented.   Psychiatric:         Mood and Affect: Mood normal.          Behavior: Behavior normal.           Fluids    Intake/Output Summary (Last 24 hours) at 6/12/2025 0721  Last data filed at 6/11/2025 1945  Gross per 24 hour   Intake 240 ml   Output 1000 ml   Net -760 ml        Laboratory  Recent Labs     06/10/25  0452 06/11/25  0208   WBC 12.9* 10.8   RBC 3.14* 2.89*   HEMOGLOBIN 8.2* 7.6*   HEMATOCRIT 26.3* 24.3*   MCV 83.8 84.1   MCH 26.1* 26.3*   MCHC 31.2* 31.3*   RDW 51.9* 51.8*   PLATELETCT 401 405   MPV 10.1 9.2     Recent Labs     06/10/25  0658 06/11/25  0208   SODIUM 134* 138   POTASSIUM 3.4* 3.5*   CHLORIDE 102 106   CO2 19* 20   GLUCOSE 147* 163*   BUN 30* 28*   CREATININE 1.51* 1.55*   CALCIUM 8.2* 8.5                   Imaging  US-EXTREMITY ARTERY LOWER BILAT   Final Result      DX-CHEST-PORTABLE (1 VIEW)   Final Result         1. No acute abnormalities evident.      2. Very severe left shoulder arthritis unchanged.                          Assessment/Plan  * Lactic acidosis- (present on admission)  Assessment & Plan  Patient found to have lactic acidosis most likely secondary infection.  I ordered sepsis order set up  Trend lactic acid level.      Advanced care planning/counseling discussion  Assessment & Plan  Pt with poor understanding of code status discussion on my evaluation. Given previous documentation of his CODE status being DNR/I we will revert to his previous wishes. Can continue to readdress and attempt to get a POLST completed    Elevated troponin  Assessment & Plan  Patient found to have atrial fibrillation with rapid events response that can lead to elevated troponin.  I ordered repeat troponin level.  Patient denied any chest pain    Open wound of both lower extremities  Assessment & Plan  Patient found to have chronic wounds on bilateral lower extremities.  Ordered wound culture and Gram stain.  Requested wound care consultation.  Based on previous Cx, abx changed to zosyn and linezolid.   I have consulted and discussed case with ID sameer  further recommendations  Order ultrasound unremarkable    Patient need aggressive wound care      Atrial fibrillation with rapid ventricular response (HCC)  Assessment & Plan  Patient found to have atrial fibrillation with rapid ventricular response  Patient underwent electrocardioversion 200 J.  Patient remains atrial fibrillation.  Will optimize electrolytes, continue beta-blocker and Eliquis       Type 2 diabetes mellitus with diabetic polyneuropathy, without long-term current use of insulin (HCC)- (present on admission)  Assessment & Plan  Type II diaebtes   Continue ISS, hypoglycemic protocol  DM diet      Severe sepsis (HCC)- (present on admission)  Assessment & Plan  This is Sepsis Present on admission  SIRS criteria identified on my evaluation include: Tachycardia, with heart rate greater than 90 BPM, Tachypnea, with respirations greater than 20 per minute, and Leukocytosis, with WBC greater than 12,000  Clinical indicators of end organ dysfunction include Lactic Acid greater than 2  Source is cellulitis  Sepsis protocol initiated  Crystalloid Fluid Administration: Resuscitation volume of 1 L bolus ordered. Reason that resuscitation volume of less than 30ml/kg was ordered concern for fluid overload  IV antibiotics as appropriate for source of sepsis  Reassessment: I have reassessed the patient's hemodynamic status    Patient received dose of vancomycin in the ER and that is good for till tomorrow.  He also received a dose of Zosyn in the ER.  Resume zosyn and start linezolid secondary to previous Cx results, ID following     Metastatic lung cancer (metastasis from lung to other site), right (HCC)  Assessment & Plan  Outpatient follow-up.    Rectal cancer (HCC)- (present on admission)  Assessment & Plan  Outpatient follow-up  Ostomy         VTE prophylaxis: eliqus        Patient is has a high medical complexity, complex decision making and is at high risk for complication, morbidity, and mortality.    [facility]  than 55 minutes spent prepping to see patient (e.g. review of tests) obtaining and/or reviewing separately obtained history. Performing a medically appropriate examination and/ evaluation.  Counseling and educating the patient/family/caregiver.  Ordering medications, tests, or procedures.  Referring and communicating with other health care professionals.  Documenting clinical information in EPIC.  Independently interpreting results and communicating results to patient/family/caregiver.  Care coordination.

## 2025-06-12 NOTE — PROGRESS NOTES
Per chart and confirmed with patient, patient is bedbound and does not ambulate. Patient requires assistance with all ADLs. Moderate assist required for turns. Maximum assistance required for bed changes. Is not impulsive. Has colostomy and chronic aguiar catheter.

## 2025-06-12 NOTE — PROGRESS NOTES
Dressing changes on BLE complete. Pt dc education provided. All questions answered. PIV removed. Supplies left behind, hospice called, transport to come back and .      1630 picked up

## 2025-06-13 LAB
BACTERIA BLD CULT: NORMAL
BACTERIA BLD CULT: NORMAL
SIGNIFICANT IND 70042: NORMAL
SIGNIFICANT IND 70042: NORMAL
SITE SITE: NORMAL
SITE SITE: NORMAL
SOURCE SOURCE: NORMAL
SOURCE SOURCE: NORMAL